# Patient Record
Sex: FEMALE | Race: WHITE | Employment: PART TIME | URBAN - METROPOLITAN AREA
[De-identification: names, ages, dates, MRNs, and addresses within clinical notes are randomized per-mention and may not be internally consistent; named-entity substitution may affect disease eponyms.]

---

## 2019-10-23 ENCOUNTER — TELEPHONE (OUTPATIENT)
Dept: INTERNAL MEDICINE CLINIC | Age: 64
End: 2019-10-23

## 2019-10-23 ENCOUNTER — OFFICE VISIT (OUTPATIENT)
Dept: INTERNAL MEDICINE CLINIC | Age: 64
End: 2019-10-23
Payer: MEDICARE

## 2019-10-23 VITALS
SYSTOLIC BLOOD PRESSURE: 128 MMHG | DIASTOLIC BLOOD PRESSURE: 88 MMHG | OXYGEN SATURATION: 97 % | RESPIRATION RATE: 16 BRPM | HEIGHT: 69 IN | HEART RATE: 77 BPM | BODY MASS INDEX: 27.85 KG/M2 | WEIGHT: 188 LBS

## 2019-10-23 DIAGNOSIS — Z12.11 SCREENING FOR COLON CANCER: ICD-10-CM

## 2019-10-23 DIAGNOSIS — Z23 NEED FOR PROPHYLACTIC VACCINATION AGAINST STREPTOCOCCUS PNEUMONIAE (PNEUMOCOCCUS): ICD-10-CM

## 2019-10-23 DIAGNOSIS — Z23 NEED FOR SHINGLES VACCINE: ICD-10-CM

## 2019-10-23 DIAGNOSIS — I10 ESSENTIAL HYPERTENSION: Primary | ICD-10-CM

## 2019-10-23 DIAGNOSIS — F32.A DEPRESSION, UNSPECIFIED DEPRESSION TYPE: ICD-10-CM

## 2019-10-23 DIAGNOSIS — E78.5 HYPERLIPIDEMIA, UNSPECIFIED HYPERLIPIDEMIA TYPE: ICD-10-CM

## 2019-10-23 DIAGNOSIS — Z23 NEEDS FLU SHOT: ICD-10-CM

## 2019-10-23 PROCEDURE — G8427 DOCREV CUR MEDS BY ELIG CLIN: HCPCS | Performed by: INTERNAL MEDICINE

## 2019-10-23 PROCEDURE — 99204 OFFICE O/P NEW MOD 45 MIN: CPT | Performed by: INTERNAL MEDICINE

## 2019-10-23 PROCEDURE — G0009 ADMIN PNEUMOCOCCAL VACCINE: HCPCS | Performed by: INTERNAL MEDICINE

## 2019-10-23 PROCEDURE — 1036F TOBACCO NON-USER: CPT | Performed by: INTERNAL MEDICINE

## 2019-10-23 PROCEDURE — 1090F PRES/ABSN URINE INCON ASSESS: CPT | Performed by: INTERNAL MEDICINE

## 2019-10-23 PROCEDURE — 1123F ACP DISCUSS/DSCN MKR DOCD: CPT | Performed by: INTERNAL MEDICINE

## 2019-10-23 PROCEDURE — G0008 ADMIN INFLUENZA VIRUS VAC: HCPCS | Performed by: INTERNAL MEDICINE

## 2019-10-23 PROCEDURE — 3017F COLORECTAL CA SCREEN DOC REV: CPT | Performed by: INTERNAL MEDICINE

## 2019-10-23 PROCEDURE — G8400 PT W/DXA NO RESULTS DOC: HCPCS | Performed by: INTERNAL MEDICINE

## 2019-10-23 PROCEDURE — 90653 IIV ADJUVANT VACCINE IM: CPT | Performed by: INTERNAL MEDICINE

## 2019-10-23 PROCEDURE — 4040F PNEUMOC VAC/ADMIN/RCVD: CPT | Performed by: INTERNAL MEDICINE

## 2019-10-23 PROCEDURE — G8417 CALC BMI ABV UP PARAM F/U: HCPCS | Performed by: INTERNAL MEDICINE

## 2019-10-23 PROCEDURE — 90670 PCV13 VACCINE IM: CPT | Performed by: INTERNAL MEDICINE

## 2019-10-23 PROCEDURE — G8482 FLU IMMUNIZE ORDER/ADMIN: HCPCS | Performed by: INTERNAL MEDICINE

## 2019-10-23 RX ORDER — ROSUVASTATIN CALCIUM 40 MG/1
40 TABLET, COATED ORAL DAILY
Qty: 30 TABLET | Refills: 3 | Status: SHIPPED | OUTPATIENT
Start: 2019-10-23 | End: 2020-02-24

## 2019-10-23 RX ORDER — VENLAFAXINE HYDROCHLORIDE 150 MG/1
150 CAPSULE, EXTENDED RELEASE ORAL DAILY
Refills: 0 | COMMUNITY
Start: 2019-09-17 | End: 2019-12-18 | Stop reason: SDUPTHER

## 2019-10-23 RX ORDER — ASCORBIC ACID 500 MG
500 TABLET ORAL DAILY
COMMUNITY

## 2019-10-23 RX ORDER — HYDROCHLOROTHIAZIDE 25 MG/1
25 TABLET ORAL DAILY
Refills: 3 | COMMUNITY
Start: 2019-09-03 | End: 2019-12-18 | Stop reason: SDUPTHER

## 2019-10-23 RX ORDER — ROSUVASTATIN CALCIUM 40 MG/1
40 TABLET, COATED ORAL EVERY EVENING
COMMUNITY
End: 2019-10-23

## 2019-10-23 RX ORDER — LISINOPRIL 20 MG/1
20 TABLET ORAL DAILY
Refills: 1 | COMMUNITY
Start: 2019-09-05 | End: 2020-02-07 | Stop reason: SDUPTHER

## 2019-10-23 SDOH — HEALTH STABILITY: MENTAL HEALTH: HOW OFTEN DO YOU HAVE A DRINK CONTAINING ALCOHOL?: 2-3 TIMES A WEEK

## 2019-10-23 SDOH — HEALTH STABILITY: MENTAL HEALTH: HOW MANY STANDARD DRINKS CONTAINING ALCOHOL DO YOU HAVE ON A TYPICAL DAY?: 1 OR 2

## 2019-10-23 ASSESSMENT — ENCOUNTER SYMPTOMS
ABDOMINAL PAIN: 0
NAUSEA: 0
RHINORRHEA: 0
SHORTNESS OF BREATH: 0
COUGH: 0
SORE THROAT: 0
DIARRHEA: 0
TROUBLE SWALLOWING: 0

## 2019-10-23 ASSESSMENT — PATIENT HEALTH QUESTIONNAIRE - PHQ9
SUM OF ALL RESPONSES TO PHQ QUESTIONS 1-9: 0
1. LITTLE INTEREST OR PLEASURE IN DOING THINGS: 0
SUM OF ALL RESPONSES TO PHQ QUESTIONS 1-9: 0
SUM OF ALL RESPONSES TO PHQ9 QUESTIONS 1 & 2: 0
2. FEELING DOWN, DEPRESSED OR HOPELESS: 0

## 2019-11-06 ENCOUNTER — TELEPHONE (OUTPATIENT)
Dept: INTERNAL MEDICINE CLINIC | Age: 64
End: 2019-11-06

## 2019-12-17 ENCOUNTER — TELEPHONE (OUTPATIENT)
Dept: INTERNAL MEDICINE CLINIC | Age: 64
End: 2019-12-17

## 2019-12-18 RX ORDER — VENLAFAXINE HYDROCHLORIDE 150 MG/1
150 CAPSULE, EXTENDED RELEASE ORAL DAILY
Qty: 90 CAPSULE | Refills: 1 | Status: SHIPPED | OUTPATIENT
Start: 2019-12-18 | End: 2020-09-09

## 2019-12-18 RX ORDER — VENLAFAXINE HYDROCHLORIDE 150 MG/1
150 CAPSULE, EXTENDED RELEASE ORAL DAILY
Qty: 30 CAPSULE | Refills: 3 | Status: SHIPPED | OUTPATIENT
Start: 2019-12-18 | End: 2019-12-18

## 2019-12-18 RX ORDER — HYDROCHLOROTHIAZIDE 25 MG/1
25 TABLET ORAL DAILY
Qty: 30 TABLET | Refills: 3 | Status: SHIPPED | OUTPATIENT
Start: 2019-12-18 | End: 2019-12-18

## 2019-12-18 RX ORDER — HYDROCHLOROTHIAZIDE 25 MG/1
25 TABLET ORAL DAILY
Qty: 90 TABLET | Refills: 1 | Status: SHIPPED | OUTPATIENT
Start: 2019-12-18 | End: 2020-10-05

## 2019-12-26 ENCOUNTER — OFFICE VISIT (OUTPATIENT)
Dept: INTERNAL MEDICINE CLINIC | Age: 64
End: 2019-12-26
Payer: MEDICARE

## 2019-12-26 VITALS
BODY MASS INDEX: 27.62 KG/M2 | HEART RATE: 100 BPM | TEMPERATURE: 98.5 F | OXYGEN SATURATION: 96 % | SYSTOLIC BLOOD PRESSURE: 128 MMHG | RESPIRATION RATE: 20 BRPM | WEIGHT: 187 LBS | DIASTOLIC BLOOD PRESSURE: 74 MMHG

## 2019-12-26 DIAGNOSIS — J20.9 BRONCHITIS, ACUTE, WITH BRONCHOSPASM: Primary | ICD-10-CM

## 2019-12-26 PROCEDURE — 1123F ACP DISCUSS/DSCN MKR DOCD: CPT | Performed by: INTERNAL MEDICINE

## 2019-12-26 PROCEDURE — 4040F PNEUMOC VAC/ADMIN/RCVD: CPT | Performed by: INTERNAL MEDICINE

## 2019-12-26 PROCEDURE — G8427 DOCREV CUR MEDS BY ELIG CLIN: HCPCS | Performed by: INTERNAL MEDICINE

## 2019-12-26 PROCEDURE — G8482 FLU IMMUNIZE ORDER/ADMIN: HCPCS | Performed by: INTERNAL MEDICINE

## 2019-12-26 PROCEDURE — 99213 OFFICE O/P EST LOW 20 MIN: CPT | Performed by: INTERNAL MEDICINE

## 2019-12-26 PROCEDURE — G8400 PT W/DXA NO RESULTS DOC: HCPCS | Performed by: INTERNAL MEDICINE

## 2019-12-26 PROCEDURE — 1036F TOBACCO NON-USER: CPT | Performed by: INTERNAL MEDICINE

## 2019-12-26 PROCEDURE — G8417 CALC BMI ABV UP PARAM F/U: HCPCS | Performed by: INTERNAL MEDICINE

## 2019-12-26 PROCEDURE — 1090F PRES/ABSN URINE INCON ASSESS: CPT | Performed by: INTERNAL MEDICINE

## 2019-12-26 PROCEDURE — 3017F COLORECTAL CA SCREEN DOC REV: CPT | Performed by: INTERNAL MEDICINE

## 2019-12-26 RX ORDER — GUAIFENESIN AND CODEINE PHOSPHATE 100; 10 MG/5ML; MG/5ML
5 SOLUTION ORAL 4 TIMES DAILY PRN
Qty: 120 ML | Refills: 0 | Status: SHIPPED | OUTPATIENT
Start: 2019-12-26 | End: 2020-01-02

## 2019-12-26 RX ORDER — AZITHROMYCIN 250 MG/1
250 TABLET, FILM COATED ORAL SEE ADMIN INSTRUCTIONS
Qty: 6 TABLET | Refills: 0 | Status: SHIPPED | OUTPATIENT
Start: 2019-12-26 | End: 2019-12-31

## 2019-12-26 RX ORDER — ALBUTEROL SULFATE 90 UG/1
2 AEROSOL, METERED RESPIRATORY (INHALATION) EVERY 6 HOURS PRN
Qty: 1 INHALER | Refills: 0 | Status: SHIPPED | OUTPATIENT
Start: 2019-12-26 | End: 2020-09-02

## 2019-12-26 ASSESSMENT — ENCOUNTER SYMPTOMS
VOICE CHANGE: 1
SHORTNESS OF BREATH: 1
RHINORRHEA: 0
CHEST TIGHTNESS: 0
SINUS PRESSURE: 1
EYE REDNESS: 0
WHEEZING: 0
SORE THROAT: 1
COUGH: 1

## 2020-02-07 ENCOUNTER — OFFICE VISIT (OUTPATIENT)
Dept: INTERNAL MEDICINE CLINIC | Age: 65
End: 2020-02-07
Payer: MEDICARE

## 2020-02-07 VITALS
BODY MASS INDEX: 28.21 KG/M2 | RESPIRATION RATE: 16 BRPM | OXYGEN SATURATION: 99 % | WEIGHT: 191 LBS | HEART RATE: 80 BPM | DIASTOLIC BLOOD PRESSURE: 84 MMHG | TEMPERATURE: 97.4 F | SYSTOLIC BLOOD PRESSURE: 134 MMHG

## 2020-02-07 PROCEDURE — G8482 FLU IMMUNIZE ORDER/ADMIN: HCPCS | Performed by: INTERNAL MEDICINE

## 2020-02-07 PROCEDURE — 1036F TOBACCO NON-USER: CPT | Performed by: INTERNAL MEDICINE

## 2020-02-07 PROCEDURE — G8417 CALC BMI ABV UP PARAM F/U: HCPCS | Performed by: INTERNAL MEDICINE

## 2020-02-07 PROCEDURE — 3017F COLORECTAL CA SCREEN DOC REV: CPT | Performed by: INTERNAL MEDICINE

## 2020-02-07 PROCEDURE — 99214 OFFICE O/P EST MOD 30 MIN: CPT | Performed by: INTERNAL MEDICINE

## 2020-02-07 PROCEDURE — G8428 CUR MEDS NOT DOCUMENT: HCPCS | Performed by: INTERNAL MEDICINE

## 2020-02-07 PROCEDURE — G8400 PT W/DXA NO RESULTS DOC: HCPCS | Performed by: INTERNAL MEDICINE

## 2020-02-07 PROCEDURE — 1123F ACP DISCUSS/DSCN MKR DOCD: CPT | Performed by: INTERNAL MEDICINE

## 2020-02-07 PROCEDURE — 4040F PNEUMOC VAC/ADMIN/RCVD: CPT | Performed by: INTERNAL MEDICINE

## 2020-02-07 PROCEDURE — G0403 EKG FOR INITIAL PREVENT EXAM: HCPCS | Performed by: INTERNAL MEDICINE

## 2020-02-07 PROCEDURE — G0402 INITIAL PREVENTIVE EXAM: HCPCS | Performed by: INTERNAL MEDICINE

## 2020-02-07 PROCEDURE — 1090F PRES/ABSN URINE INCON ASSESS: CPT | Performed by: INTERNAL MEDICINE

## 2020-02-07 RX ORDER — LISINOPRIL 20 MG/1
20 TABLET ORAL DAILY
Qty: 90 TABLET | Refills: 1 | Status: SHIPPED | OUTPATIENT
Start: 2020-02-07 | End: 2020-08-04

## 2020-02-07 RX ORDER — CHLORAL HYDRATE 500 MG
1000 CAPSULE ORAL DAILY
COMMUNITY
Start: 2020-02-07

## 2020-02-07 ASSESSMENT — LIFESTYLE VARIABLES
HOW OFTEN DURING THE LAST YEAR HAVE YOU FOUND THAT YOU WERE NOT ABLE TO STOP DRINKING ONCE YOU HAD STARTED: 0
HAVE YOU OR SOMEONE ELSE BEEN INJURED AS A RESULT OF YOUR DRINKING: 0
HOW OFTEN DURING THE LAST YEAR HAVE YOU NEEDED AN ALCOHOLIC DRINK FIRST THING IN THE MORNING TO GET YOURSELF GOING AFTER A NIGHT OF HEAVY DRINKING: 0
HOW OFTEN DURING THE LAST YEAR HAVE YOU FAILED TO DO WHAT WAS NORMALLY EXPECTED FROM YOU BECAUSE OF DRINKING: 0
AUDIT TOTAL SCORE: 4
HOW OFTEN DURING THE LAST YEAR HAVE YOU BEEN UNABLE TO REMEMBER WHAT HAPPENED THE NIGHT BEFORE BECAUSE YOU HAD BEEN DRINKING: 0
HAS A RELATIVE, FRIEND, DOCTOR, OR ANOTHER HEALTH PROFESSIONAL EXPRESSED CONCERN ABOUT YOUR DRINKING OR SUGGESTED YOU CUT DOWN: 0
HOW MANY STANDARD DRINKS CONTAINING ALCOHOL DO YOU HAVE ON A TYPICAL DAY: 0
HOW OFTEN DO YOU HAVE SIX OR MORE DRINKS ON ONE OCCASION: 0
HOW OFTEN DO YOU HAVE A DRINK CONTAINING ALCOHOL: 4
HOW OFTEN DURING THE LAST YEAR HAVE YOU HAD A FEELING OF GUILT OR REMORSE AFTER DRINKING: 0
AUDIT-C TOTAL SCORE: 4

## 2020-02-07 ASSESSMENT — ENCOUNTER SYMPTOMS
RHINORRHEA: 0
SHORTNESS OF BREATH: 0
SORE THROAT: 0
NAUSEA: 0
COUGH: 0
TROUBLE SWALLOWING: 0
ABDOMINAL PAIN: 0
DIARRHEA: 0

## 2020-02-07 ASSESSMENT — PATIENT HEALTH QUESTIONNAIRE - PHQ9
SUM OF ALL RESPONSES TO PHQ QUESTIONS 1-9: 0
SUM OF ALL RESPONSES TO PHQ QUESTIONS 1-9: 0

## 2020-02-07 NOTE — PROGRESS NOTES
[address] [facility] PRIMARY CARE  [address] [facility] [address]  Dept: [phone]  Dept Fax: [phone]  Loc: [phone]     2020    Shy Mcclure (:  1955) is a 71 y.o. female, here for evaluation of the following medical concerns:    Chief Complaint   Patient presents with    Medicare AWV       HPI     Patient is here for routine visit and her welcome to Medicare visit. Overall she is feeling well. She reports she has not been as active as she will be. She plans to join [employer]. She already does balance exercises at home. She likes to stay very on top of her health care. Review of Systems   Constitutional: Negative for fatigue and fever. HENT: Negative for rhinorrhea, sore throat and trouble swallowing. Eyes: Negative for visual disturbance. Respiratory: Negative for cough and shortness of breath. Cardiovascular: Negative for chest pain and palpitations. Gastrointestinal: Negative for abdominal pain, diarrhea and nausea. Genitourinary: Negative for decreased urine volume, dysuria and frequency. Stress incontinence   Musculoskeletal: Negative for arthralgias and myalgias. Skin: Negative for rash. Allergic/Immunologic: Negative for immunocompromised state. Neurological: Negative for dizziness, numbness and headaches. Hematological: Does not bruise/bleed easily. Psychiatric/Behavioral: Negative for dysphoric mood and sleep disturbance. The patient is not nervous/anxious.       Current Outpatient Medications   Medication Sig Dispense Refill    lisinopril (PRINIVIL;ZESTRIL) 20 MG tablet Take 1 tablet by mouth daily 90 tablet 1    Omega-3 Fatty Acids (FISH OIL) 1000 MG CAPS Take 1 capsule by mouth daily      zoster recombinant adjuvanted vaccine (SHINGRIX) 50 MCG/0.5ML SUSR injection Inject 0.5 mLs into the muscle once for 1 dose Repeat in 2-6 months 1 each 1    albuterol sulfate  (90 Base) MCG/ACT inhaler Inhale 2 puffs into the lungs every 6 hours as needed for Wheezing 1 Inhaler 0    hydrochlorothiazide (HYDRODIURIL) 25 MG tablet Take 1 tablet by mouth daily 90 tablet 1    venlafaxine (EFFEXOR XR) 150 MG extended release capsule Take 1 capsule by mouth daily 90 capsule 1    Cyanocobalamin (VITAMIN B12 PO) Take by mouth      CALCIUM-MAGNESIUM-ZINC PO Take by mouth      VITAMIN D PO Take by mouth      Multiple Vitamins-Minerals (MULTIVITAMIN ADULT PO) Take by mouth      vitamin C (ASCORBIC ACID) 500 MG tablet Take 500 mg by mouth daily      rosuvastatin (CRESTOR) 40 MG tablet Take 1 tablet by mouth daily 30 tablet 3     No current facility-administered medications for this visit.         Allergies   Allergen Reactions    Cephalosporins Hives and Shortness Of Breath    Penicillins Anaphylaxis       Past Medical History:   Diagnosis Date    Depression     Endometriosis     Essential hypertension     [facility] 2018    [facility]     Pap smear for cervical cancer screening 2016    Sleep apnea     cpap    Visit for review of DEXA scan     10/16-wnl T score       Past Surgical History:   Procedure Laterality Date    ANUS SURGERY      anal fissure    COLONOSCOPY  2014    recheck     CYST REMOVAL      right foot    JOINT REPLACEMENT Right     PARTIAL HYSTERECTOMY  2006    still w cervix and ovaries    SEPTOPLASTY      SUBTOTAL COLECTOMY  2007    perforated colon     TONSILLECTOMY         Social History     Tobacco Use    Smoking status: Former Smoker     Last attempt to quit: 1986     Years since quittin.1    Smokeless tobacco: Never Used   Substance Use Topics    Alcohol use: Yes     Frequency: 2-3 times a week     Drinks per session: 1 or 2    Drug use: Never        Family History   Problem Relation Age of Onset    Lung Cancer Mother     Prostate Cancer Father     Prostate Cancer Brother     Breast Cancer Maternal Cousin Vitals:    02/07/20 1023   BP: 134/84   Site: Right Upper Arm   Position: Sitting   Cuff Size: Large Adult   Pulse: 80  Comment: REGULAR   Resp: 16   Temp: 97.4 °F (36.3 °C)   TempSrc: Oral   SpO2: 99%  Comment: rOOM aIR   Weight: 191 lb (86.6 kg)     Estimated body mass index is 28.21 kg/m² as calculated from the following:    Height as of 10/23/19: 5' 9\" (1.753 m). Weight as of this encounter: 191 lb (86.6 kg). Physical Exam  Vitals signs and nursing note reviewed. Constitutional:       General: She is not in acute distress. Appearance: She is well-developed. HENT:      Head: Normocephalic and atraumatic. Right Ear: External ear normal.      Left Ear: External ear normal.      Nose: Nose normal.   Eyes:      General: No scleral icterus. Pupils: Pupils are equal, round, and reactive to light. Neck:      Thyroid: No thyromegaly. Cardiovascular:      Rate and Rhythm: Normal rate and regular rhythm. Heart sounds: No murmur. Pulmonary:      Effort: No respiratory distress. Breath sounds: Normal breath sounds. No wheezing or rales. Abdominal:      General: Bowel sounds are normal. There is no distension. Palpations: Abdomen is soft. Tenderness: There is no abdominal tenderness. Musculoskeletal: Normal range of motion. Lymphadenopathy:      Cervical: No cervical adenopathy. Skin:     General: Skin is warm and dry. Neurological:      Mental Status: She is alert and oriented to person, place, and time. Cranial Nerves: No cranial nerve deficit. Sensory: No sensory deficit. Coordination: Coordination normal.   Psychiatric:         Behavior: Behavior normal.         ASSESSMENT/PLAN:  1. Essential hypertension  Usually well-controlled. Didn't take her lisinopril last night because ran out. Continue hctz and restart Lisinopril. Call if dizziness.     2. Hyperlipidemia, unspecified hyperlipidemia type  We will check labs on current dose of mouth daily Yes Marily Valera MD   zoster recombinant adjuvanted vaccine (SHINGRIX) 50 MCG/0.5ML SUSR injection Inject 0.5 mLs into the muscle once for 1 dose Repeat in 2-6 months Yes Marily Valera MD   albuterol sulfate  (90 Base) MCG/ACT inhaler Inhale 2 puffs into the lungs every 6 hours as needed for Wheezing Yes Marily Valera MD   hydrochlorothiazide (HYDRODIURIL) 25 MG tablet Take 1 tablet by mouth daily Yes Marily Valera MD   venlafaxine (EFFEXOR XR) 150 MG extended release capsule Take 1 capsule by mouth daily Yes Marily Valera MD   Cyanocobalamin (VITAMIN B12 PO) Take by mouth Yes Historical Provider, MD   CALCIUM-MAGNESIUM-ZINC PO Take by mouth Yes Historical Provider, MD   VITAMIN D PO Take by mouth Yes Historical Provider, MD   Multiple Vitamins-Minerals (MULTIVITAMIN ADULT PO) Take by mouth Yes Historical Provider, MD   vitamin C (ASCORBIC ACID) 500 MG tablet Take 500 mg by mouth daily Yes Historical Provider, MD   rosuvastatin (CRESTOR) 40 MG tablet Take 1 tablet by mouth daily Yes Marily Valera MD         Past Medical History:   Diagnosis Date    Depression     Endometriosis     Essential hypertension     [facility]     Pap smear for cervical cancer screening 08/25/2016    Sleep apnea     cpap    Visit for review of DEXA scan     10/16-wnl T score       Past Surgical History:   Procedure Laterality Date    ANUS SURGERY      anal fissure    COLONOSCOPY  09/2014    recheck 9/19    CYST REMOVAL      right foot    JOINT REPLACEMENT Right 2011    PARTIAL HYSTERECTOMY  2006    still w cervix and ovaries    SEPTOPLASTY      SUBTOTAL COLECTOMY  09/2007    perforated colon     TONSILLECTOMY           Family History   Problem Relation Age of Onset    Lung Cancer Mother     Prostate Cancer Father     Prostate Cancer Brother     Breast Cancer Maternal Cousin

## 2020-02-07 NOTE — PATIENT INSTRUCTIONS
Dr. Kylie Aguirre -- pap/pelvic - last 8/16 was last pap  Get Gi/colonoscopy    Labs  EKG    ---    Personalized Preventive Plan for Jayshree Painter - 2/7/2020  Medicare offers a range of preventive health benefits. Some of the tests and screenings are paid in full while other may be subject to a deductible, co-insurance, and/or copay. Some of these benefits include a comprehensive review of your medical history including lifestyle, illnesses that may run in your family, and various assessments and screenings as appropriate. After reviewing your medical record and screening and assessments performed today your provider may have ordered immunizations, labs, imaging, and/or referrals for you. A list of these orders (if applicable) as well as your Preventive Care list are included within your After Visit Summary for your review. Other Preventive Recommendations:    · A preventive eye exam performed by an eye specialist is recommended every 1-2 years to screen for glaucoma; cataracts, macular degeneration, and other eye disorders. · A preventive dental visit is recommended every 6 months. · Try to get at least 150 minutes of exercise per week or 10,000 steps per day on a pedometer . · Order or download the FREE \"Exercise & Physical Activity: Your Everyday Guide\" from [employer]. Call 2-797.720.7251 or search [employer] online. · You need 7896-1453 mg of calcium and 5609-7198 IU of vitamin D per day. It is possible to meet your calcium requirement with diet alone, but a vitamin D supplement is usually necessary to meet this goal.  · When exposed to the sun, use a sunscreen that protects against both UVA and UVB radiation with an SPF of 30 or greater. Reapply every 2 to 3 hours or after sweating, drying off with a towel, or swimming. · Always wear a seat belt when traveling in a car. Always wear a helmet when riding a bicycle or motorcycle.

## 2020-02-24 ENCOUNTER — HOSPITAL ENCOUNTER (OUTPATIENT)
Dept: NON INVASIVE DIAGNOSTICS | Age: 65
Discharge: HOME | End: 2020-02-24
Payer: MEDICARE

## 2020-02-24 LAB
LV EF: 58 %
LVEF MODALITY: NORMAL

## 2020-02-24 PROCEDURE — C8929 TTE W OR WO FOL WCON,DOPPLER: HCPCS

## 2020-02-24 RX ORDER — ROSUVASTATIN CALCIUM 40 MG/1
TABLET, COATED ORAL
Qty: 90 TABLET | Refills: 1 | Status: SHIPPED | OUTPATIENT
Start: 2020-02-24 | End: 2020-08-26

## 2020-03-02 ENCOUNTER — TELEPHONE (OUTPATIENT)
Dept: INTERNAL MEDICINE CLINIC | Age: 65
End: 2020-03-02

## 2020-03-09 ENCOUNTER — OFFICE VISIT (OUTPATIENT)
Dept: CARDIOLOGY CLINIC | Age: 65
End: 2020-03-09
Payer: MEDICARE

## 2020-03-09 VITALS
OXYGEN SATURATION: 96 % | HEART RATE: 91 BPM | SYSTOLIC BLOOD PRESSURE: 110 MMHG | WEIGHT: 195.4 LBS | BODY MASS INDEX: 27.97 KG/M2 | DIASTOLIC BLOOD PRESSURE: 88 MMHG | HEIGHT: 70 IN

## 2020-03-09 PROCEDURE — 4040F PNEUMOC VAC/ADMIN/RCVD: CPT | Performed by: INTERNAL MEDICINE

## 2020-03-09 PROCEDURE — 1090F PRES/ABSN URINE INCON ASSESS: CPT | Performed by: INTERNAL MEDICINE

## 2020-03-09 PROCEDURE — 3017F COLORECTAL CA SCREEN DOC REV: CPT | Performed by: INTERNAL MEDICINE

## 2020-03-09 PROCEDURE — G8427 DOCREV CUR MEDS BY ELIG CLIN: HCPCS | Performed by: INTERNAL MEDICINE

## 2020-03-09 PROCEDURE — G8417 CALC BMI ABV UP PARAM F/U: HCPCS | Performed by: INTERNAL MEDICINE

## 2020-03-09 PROCEDURE — 1123F ACP DISCUSS/DSCN MKR DOCD: CPT | Performed by: INTERNAL MEDICINE

## 2020-03-09 PROCEDURE — 99214 OFFICE O/P EST MOD 30 MIN: CPT | Performed by: INTERNAL MEDICINE

## 2020-03-09 PROCEDURE — G8482 FLU IMMUNIZE ORDER/ADMIN: HCPCS | Performed by: INTERNAL MEDICINE

## 2020-03-09 PROCEDURE — G8400 PT W/DXA NO RESULTS DOC: HCPCS | Performed by: INTERNAL MEDICINE

## 2020-03-09 PROCEDURE — 1036F TOBACCO NON-USER: CPT | Performed by: INTERNAL MEDICINE

## 2020-03-09 ASSESSMENT — ENCOUNTER SYMPTOMS
SHORTNESS OF BREATH: 0
CHOKING: 0
COUGH: 0
CHEST TIGHTNESS: 0

## 2020-03-09 NOTE — PROGRESS NOTES
Subjective:      Patient ID: Tri Hall is a 71 y.o. female. HPI Referred abn ekg//HTN/lipids. No previous cardiac hx. Active. No exertional sx. No chest pain/sob. Exercising 4-5x per wk   No pnd or orthopnea. No palp/tachy/syncope. Feeling good.         Past Medical History:   Diagnosis Date    Depression     Endometriosis     Essential hypertension     H/O mammogram 2018    [place]    Hyperlipidemia     Pap smear for cervical cancer screening 2016    Sleep apnea     cpap    Visit for review of DEXA scan     10/16-wnl T score     Past Surgical History:   Procedure Laterality Date    ANUS SURGERY      anal fissure    COLONOSCOPY  2014    recheck     CYST REMOVAL      right foot    JOINT REPLACEMENT Right     PARTIAL HYSTERECTOMY  2006    still w cervix and ovaries    SEPTOPLASTY      SUBTOTAL COLECTOMY  2007    perforated colon     TONSILLECTOMY       Social History     Socioeconomic History    Marital status: Single     Spouse name: Not on file    Number of children: 0    Years of education: Not on file    Highest education level: Not on file   Occupational History    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     Comment: [employer]   Social Needs    Financial resource strain: Not on file    Food insecurity     Worry: Not on file     Inability: Not on file   [employer] needs     Medical: Not on file     Non-medical: Not on file   Tobacco Use    Smoking status: Former Smoker     Last attempt to quit: 1986     Years since quittin.1    Smokeless tobacco: Never Used   Substance and Sexual Activity    Alcohol use: Yes     Frequency: 2-3 times a week     Drinks per session: 1 or 2    Drug use: Never    Sexual activity: Not Currently   Lifestyle    Physical activity     Days per week: Not on file     Minutes per session: Not on file    Stress: Not on file   Relationships    Social connections     Talks on phone: Not on file     Gets together: Not on file Attends Rastafarian service: Not on file     Active member of club or organization: Not on file     Attends meetings of clubs or organizations: Not on file     Relationship status: Not on file    Intimate partner violence     Fear of current or ex partner: Not on file     Emotionally abused: Not on file     Physically abused: Not on file     Forced sexual activity: Not on file   Other Topics Concern    Not on file   Social History Narrative    Lives alone with dog (kaushal)     FH reviewed, Aunt with MI at 62,  Fa with stents in his [de-identified]      Vitals:    03/09/20 1455   BP: 110/88   Pulse: 91   SpO2: 96%     Wt 195    Review of Systems   Constitutional: Negative for activity change, appetite change and fatigue. Respiratory: Negative for cough, choking, chest tightness and shortness of breath. Cardiovascular: Negative for chest pain, palpitations and leg swelling. Denies PND or orthopnea. No tachycardia or syncope. Neurological: Negative for dizziness, syncope and light-headedness. Psychiatric/Behavioral: Negative for agitation, behavioral problems and confusion. All other systems reviewed and are negative. Objective:   Physical Exam  Constitutional:       General: She is not in acute distress. Appearance: Normal appearance. She is well-developed. HENT:      Head: Normocephalic. Right Ear: External ear normal.      Left Ear: External ear normal.   Neck:      Musculoskeletal: Normal range of motion. Vascular: No JVD. Cardiovascular:      Rate and Rhythm: Normal rate and regular rhythm. Heart sounds: Normal heart sounds. No murmur. No gallop. Pulmonary:      Effort: Pulmonary effort is normal. No respiratory distress. Breath sounds: Normal breath sounds. No stridor. No rhonchi or rales. Abdominal:      General: Bowel sounds are normal.      Palpations: Abdomen is soft. Tenderness: There is no abdominal tenderness. Musculoskeletal: Normal range of motion.

## 2020-03-10 ENCOUNTER — TELEPHONE (OUTPATIENT)
Dept: CARDIOLOGY CLINIC | Age: 65
End: 2020-03-10

## 2020-03-16 ENCOUNTER — HOSPITAL ENCOUNTER (OUTPATIENT)
Dept: NON INVASIVE DIAGNOSTICS | Age: 65
Discharge: HOME | End: 2020-03-16
Payer: MEDICARE

## 2020-03-16 LAB
LV EF: 68 %
LVEF MODALITY: NORMAL

## 2020-03-16 PROCEDURE — 6360000002 HC RX W HCPCS: Performed by: INTERNAL MEDICINE

## 2020-03-16 PROCEDURE — 93017 CV STRESS TEST TRACING ONLY: CPT

## 2020-03-16 PROCEDURE — A9502 TC99M TETROFOSMIN: HCPCS | Performed by: INTERNAL MEDICINE

## 2020-03-16 PROCEDURE — 78452 HT MUSCLE IMAGE SPECT MULT: CPT

## 2020-03-16 PROCEDURE — 3430000000 HC RX DIAGNOSTIC RADIOPHARMACEUTICAL: Performed by: INTERNAL MEDICINE

## 2020-03-16 PROCEDURE — 2580000003 HC RX 258: Performed by: INTERNAL MEDICINE

## 2020-03-16 RX ORDER — SODIUM CHLORIDE 0.9 % (FLUSH) 0.9 %
10 SYRINGE (ML) INJECTION PRN
Status: DISCONTINUED | OUTPATIENT
Start: 2020-03-16 | End: 2020-03-17 | Stop reason: HOSPADM

## 2020-03-16 RX ADMIN — TETROFOSMIN 33.2 MILLICURIE: 1.38 INJECTION, POWDER, LYOPHILIZED, FOR SOLUTION INTRAVENOUS at 13:21

## 2020-03-16 RX ADMIN — REGADENOSON 0.4 MG: 0.08 INJECTION, SOLUTION INTRAVENOUS at 13:21

## 2020-03-16 RX ADMIN — Medication 10 ML: at 12:23

## 2020-03-16 RX ADMIN — TETROFOSMIN 11.8 MILLICURIE: 1.38 INJECTION, POWDER, LYOPHILIZED, FOR SOLUTION INTRAVENOUS at 12:23

## 2020-03-16 RX ADMIN — Medication 10 ML: at 13:21

## 2020-04-13 ENCOUNTER — TELEMEDICINE (OUTPATIENT)
Dept: INTERNAL MEDICINE CLINIC | Age: 65
End: 2020-04-13
Payer: MEDICARE

## 2020-04-13 ENCOUNTER — TELEPHONE (OUTPATIENT)
Dept: INTERNAL MEDICINE CLINIC | Age: 65
End: 2020-04-13

## 2020-04-13 PROCEDURE — G8400 PT W/DXA NO RESULTS DOC: HCPCS | Performed by: INTERNAL MEDICINE

## 2020-04-13 PROCEDURE — 99213 OFFICE O/P EST LOW 20 MIN: CPT | Performed by: INTERNAL MEDICINE

## 2020-04-13 PROCEDURE — 1123F ACP DISCUSS/DSCN MKR DOCD: CPT | Performed by: INTERNAL MEDICINE

## 2020-04-13 PROCEDURE — G8427 DOCREV CUR MEDS BY ELIG CLIN: HCPCS | Performed by: INTERNAL MEDICINE

## 2020-04-13 PROCEDURE — 1090F PRES/ABSN URINE INCON ASSESS: CPT | Performed by: INTERNAL MEDICINE

## 2020-04-13 PROCEDURE — 3017F COLORECTAL CA SCREEN DOC REV: CPT | Performed by: INTERNAL MEDICINE

## 2020-04-13 PROCEDURE — 4040F PNEUMOC VAC/ADMIN/RCVD: CPT | Performed by: INTERNAL MEDICINE

## 2020-04-13 RX ORDER — CLARITHROMYCIN 500 MG/1
500 TABLET, COATED ORAL 2 TIMES DAILY
Qty: 20 TABLET | Refills: 0 | Status: SHIPPED | OUTPATIENT
Start: 2020-04-13 | End: 2020-04-23

## 2020-04-15 ENCOUNTER — TELEPHONE (OUTPATIENT)
Dept: INTERNAL MEDICINE CLINIC | Age: 65
End: 2020-04-15

## 2020-04-15 NOTE — TELEPHONE ENCOUNTER
Please let patient know her insurance company sent me a note that Atorvastatin would be cheaper for her than Crestor and they want to know if I can change her Rx. Is that okay with her?

## 2020-04-15 NOTE — TELEPHONE ENCOUNTER
Left message for patient to return call. Patient returned call and states she had reaction to Lipitor in the past with muscle weakness.

## 2020-05-14 ENCOUNTER — TELEPHONE (OUTPATIENT)
Dept: INTERNAL MEDICINE CLINIC | Age: 65
End: 2020-05-14

## 2020-08-04 RX ORDER — LISINOPRIL 20 MG/1
TABLET ORAL
Qty: 90 TABLET | Refills: 1 | Status: SHIPPED | OUTPATIENT
Start: 2020-08-04 | End: 2021-02-01

## 2020-08-26 RX ORDER — ROSUVASTATIN CALCIUM 40 MG/1
TABLET, COATED ORAL
Qty: 90 TABLET | Refills: 1 | Status: SHIPPED | OUTPATIENT
Start: 2020-08-26 | End: 2021-03-08

## 2020-09-02 ENCOUNTER — OFFICE VISIT (OUTPATIENT)
Dept: INTERNAL MEDICINE CLINIC | Age: 65
End: 2020-09-02
Payer: MEDICARE

## 2020-09-02 VITALS
TEMPERATURE: 97.2 F | BODY MASS INDEX: 28.73 KG/M2 | RESPIRATION RATE: 16 BRPM | HEART RATE: 82 BPM | SYSTOLIC BLOOD PRESSURE: 124 MMHG | DIASTOLIC BLOOD PRESSURE: 86 MMHG | WEIGHT: 194 LBS | OXYGEN SATURATION: 97 % | HEIGHT: 69 IN

## 2020-09-02 LAB
A/G RATIO: 2.1 (ref 1.1–2.2)
ALBUMIN SERPL-MCNC: 4.7 G/DL (ref 3.4–5)
ALP BLD-CCNC: 65 U/L (ref 40–129)
ALT SERPL-CCNC: 29 U/L (ref 10–40)
ANION GAP SERPL CALCULATED.3IONS-SCNC: 11 MMOL/L (ref 3–16)
AST SERPL-CCNC: 32 U/L (ref 15–37)
BILIRUB SERPL-MCNC: 0.4 MG/DL (ref 0–1)
BUN BLDV-MCNC: 22 MG/DL (ref 7–20)
CALCIUM SERPL-MCNC: 9.9 MG/DL (ref 8.3–10.6)
CHLORIDE BLD-SCNC: 97 MMOL/L (ref 99–110)
CHOLESTEROL, TOTAL: 217 MG/DL (ref 0–199)
CO2: 28 MMOL/L (ref 21–32)
CREAT SERPL-MCNC: 0.7 MG/DL (ref 0.6–1.2)
GFR AFRICAN AMERICAN: >60
GFR NON-AFRICAN AMERICAN: >60
GLOBULIN: 2.2 G/DL
GLUCOSE BLD-MCNC: 115 MG/DL (ref 70–99)
HDLC SERPL-MCNC: 47 MG/DL (ref 40–60)
LDL CHOLESTEROL CALCULATED: ABNORMAL MG/DL
LDL CHOLESTEROL DIRECT: 82 MG/DL
POTASSIUM SERPL-SCNC: 4.2 MMOL/L (ref 3.5–5.1)
SODIUM BLD-SCNC: 136 MMOL/L (ref 136–145)
TOTAL PROTEIN: 6.9 G/DL (ref 6.4–8.2)
TRIGL SERPL-MCNC: 400 MG/DL (ref 0–150)
VLDLC SERPL CALC-MCNC: ABNORMAL MG/DL

## 2020-09-02 PROCEDURE — 1036F TOBACCO NON-USER: CPT | Performed by: INTERNAL MEDICINE

## 2020-09-02 PROCEDURE — G8417 CALC BMI ABV UP PARAM F/U: HCPCS | Performed by: INTERNAL MEDICINE

## 2020-09-02 PROCEDURE — 1090F PRES/ABSN URINE INCON ASSESS: CPT | Performed by: INTERNAL MEDICINE

## 2020-09-02 PROCEDURE — 99214 OFFICE O/P EST MOD 30 MIN: CPT | Performed by: INTERNAL MEDICINE

## 2020-09-02 PROCEDURE — 1123F ACP DISCUSS/DSCN MKR DOCD: CPT | Performed by: INTERNAL MEDICINE

## 2020-09-02 PROCEDURE — G8427 DOCREV CUR MEDS BY ELIG CLIN: HCPCS | Performed by: INTERNAL MEDICINE

## 2020-09-02 PROCEDURE — 4040F PNEUMOC VAC/ADMIN/RCVD: CPT | Performed by: INTERNAL MEDICINE

## 2020-09-02 PROCEDURE — 3017F COLORECTAL CA SCREEN DOC REV: CPT | Performed by: INTERNAL MEDICINE

## 2020-09-02 PROCEDURE — G8400 PT W/DXA NO RESULTS DOC: HCPCS | Performed by: INTERNAL MEDICINE

## 2020-09-02 RX ORDER — PSEUDOEPHEDRINE HCL 30 MG
250 TABLET ORAL DAILY
Qty: 60 TABLET | Refills: 0 | COMMUNITY
Start: 2020-09-02

## 2020-09-02 ASSESSMENT — ENCOUNTER SYMPTOMS
TROUBLE SWALLOWING: 0
ABDOMINAL PAIN: 0
DIARRHEA: 0
SORE THROAT: 0
SHORTNESS OF BREATH: 0
RHINORRHEA: 0
NAUSEA: 0
COUGH: 0

## 2020-09-02 NOTE — PROGRESS NOTES
[address] [facility] PRIMARY CARE  [address] [facility] [place] [facility] [address]  Dept: [phone]  Dept Fax: [phone]  Loc: [phone]     2020     Cinda Pizarro (:  1955) is a 76 y.o. female, here for evaluation of the following medical concerns:    Chief Complaint   Patient presents with    Follow-up     \"Bump on right ring finger. HPI   Patient is here for routine visit. Overall she feels well and is compliant with her medications. She questions whether the bronchitis episode she had in December may have been COVID. She notes a nodule on her distal right second finger. Review of Systems   Constitutional: Negative for fatigue and fever. HENT: Negative for rhinorrhea, sore throat and trouble swallowing. Eyes: Negative for visual disturbance. Respiratory: Negative for cough and shortness of breath. Cardiovascular: Negative for chest pain and palpitations. Gastrointestinal: Negative for abdominal pain, diarrhea and nausea. Genitourinary: Negative for decreased urine volume, dysuria and frequency. Musculoskeletal: Negative for arthralgias and myalgias. Skin: Negative for rash. Allergic/Immunologic: Negative for immunocompromised state. Neurological: Negative for dizziness, numbness and headaches. Hematological: Does not bruise/bleed easily. Psychiatric/Behavioral: Negative for dysphoric mood and sleep disturbance. The patient is not nervous/anxious. Prior to Visit Medications    Medication Sig Taking?  Authorizing Provider   rosuvastatin (CRESTOR) 40 MG tablet TAKE 1 TABLET BY MOUTH EVERY DAY  Carmelita Redmond MD   lisinopril (PRINIVIL;ZESTRIL) 20 MG tablet TAKE 1 TABLET BY MOUTH EVERY DAY  Carmelita Redmond MD   Omega-3 Fatty Acids (FISH OIL) 1000 MG CAPS Take 1 capsule by mouth daily  Benjamin Sandoval MD   albuterol sulfate  (90 Base) MCG/ACT inhaler Inhale 2 puffs into the lungs every 6 hours as needed for Wheezing  Kimani Da Silva MD   hydrochlorothiazide (HYDRODIURIL) 25 MG tablet Take 1 tablet by mouth daily  Kimani Da Silva MD   venlafaxine (EFFEXOR XR) 150 MG extended release capsule Take 1 capsule by mouth daily  Kimani Da Silva MD   Cyanocobalamin (VITAMIN B12 PO) Take by mouth  Historical Provider, MD   CALCIUM-MAGNESIUM-ZINC PO Take by mouth 2 times daily   Historical Provider, MD   VITAMIN D PO Take by mouth  Historical Provider, MD   Multiple Vitamins-Minerals (MULTIVITAMIN ADULT PO) Take by mouth  Historical Provider, MD   vitamin C (ASCORBIC ACID) 500 MG tablet Take 500 mg by mouth daily  Historical Provider, MD        Social History     Tobacco Use    Smoking status: Former Smoker     Last attempt to quit: 1986     Years since quittin.6    Smokeless tobacco: Never Used   Substance Use Topics    Alcohol use: Yes     Frequency: 2-3 times a week     Drinks per session: 1 or 2    Drug use: Never        Vitals:    20 0817   BP: 124/86   Site: Right Upper Arm   Position: Sitting   Cuff Size: Large Adult   Pulse: 82  Comment: Regular   Resp: 16   Temp: 97.2 °F (36.2 °C)   TempSrc: Temporal   SpO2: 97%  Comment: Room AIr   Weight: 194 lb (88 kg)   Height: 5' 9\" (1.753 m)     Estimated body mass index is 28.65 kg/m² as calculated from the following:    Height as of this encounter: 5' 9\" (1.753 m). Weight as of this encounter: 194 lb (88 kg). Physical Exam  Vitals signs and nursing note reviewed. Constitutional:       General: She is not in acute distress. Appearance: She is well-developed. HENT:      Head: Normocephalic and atraumatic. Right Ear: External ear normal.      Left Ear: External ear normal.      Nose: Nose normal.   Eyes:      General: No scleral icterus. Pupils: Pupils are equal, round, and reactive to light. Neck:      Thyroid: No thyromegaly.    Cardiovascular:      Rate and Rhythm: Normal rate and regular rhythm. Heart sounds: No murmur. Pulmonary:      Effort: No respiratory distress. Breath sounds: Normal breath sounds. No wheezing or rales. Abdominal:      General: Bowel sounds are normal. There is no distension. Palpations: Abdomen is soft. Tenderness: There is no abdominal tenderness. Musculoskeletal: Normal range of motion. Lymphadenopathy:      Cervical: No cervical adenopathy. Skin:     General: Skin is warm and dry. Neurological:      Mental Status: She is alert and oriented to person, place, and time. Cranial Nerves: No cranial nerve deficit. Sensory: No sensory deficit. Coordination: Coordination normal.   Psychiatric:         Behavior: Behavior normal.         ASSESSMENT/PLAN:  1. Essential hypertension  Controlled on lisinopril and hydrochlorothiazide  - Comprehensive Metabolic Panel; Future    2. Depression, unspecified depression type  Well-controlled on Effexor    3. Hyperlipidemia, unspecified hyperlipidemia type  Controlled on Crestor  - Lipid Panel; Future    4. Hyperglycemia    - Hemoglobin A1C; Future    5. Screening for breast cancer    - [facility] DIGITAL SCREEN W OR WO CAD BILATERAL; Future    6. Screening for colon cancer    - [facility] Perez MD, Gastroenterology, [place]    7. Encounter for screening mammogram for malignant neoplasm of breast     - [facility] DIGITAL SCREEN W OR WO CAD BILATERAL; Future    Return in about 6 months (around 3/2/2021). Age and gender appropriate preventive health care needs were discussed with patient and addressed as needed.

## 2020-09-03 LAB
ESTIMATED AVERAGE GLUCOSE: 122.6 MG/DL
HBA1C MFR BLD: 5.9 %

## 2020-09-09 RX ORDER — VENLAFAXINE HYDROCHLORIDE 150 MG/1
CAPSULE, EXTENDED RELEASE ORAL
Qty: 90 CAPSULE | Refills: 1 | Status: SHIPPED | OUTPATIENT
Start: 2020-09-09 | End: 2021-03-15

## 2020-10-05 RX ORDER — HYDROCHLOROTHIAZIDE 25 MG/1
TABLET ORAL
Qty: 90 TABLET | Refills: 1 | Status: SHIPPED | OUTPATIENT
Start: 2020-10-05 | End: 2021-03-17 | Stop reason: SDUPTHER

## 2021-02-01 DIAGNOSIS — I10 ESSENTIAL HYPERTENSION: ICD-10-CM

## 2021-02-01 RX ORDER — LISINOPRIL 20 MG/1
TABLET ORAL
Qty: 90 TABLET | Refills: 1 | Status: SHIPPED | OUTPATIENT
Start: 2021-02-01 | End: 2021-03-25 | Stop reason: SDUPTHER

## 2021-03-04 ENCOUNTER — OFFICE VISIT (OUTPATIENT)
Dept: INTERNAL MEDICINE CLINIC | Age: 66
End: 2021-03-04
Payer: MEDICARE

## 2021-03-04 VITALS
OXYGEN SATURATION: 97 % | TEMPERATURE: 97.8 F | WEIGHT: 193 LBS | RESPIRATION RATE: 16 BRPM | BODY MASS INDEX: 28.58 KG/M2 | HEART RATE: 80 BPM | HEIGHT: 69 IN | DIASTOLIC BLOOD PRESSURE: 90 MMHG | SYSTOLIC BLOOD PRESSURE: 138 MMHG

## 2021-03-04 DIAGNOSIS — I10 ESSENTIAL HYPERTENSION: Primary | ICD-10-CM

## 2021-03-04 DIAGNOSIS — R93.7 ABNORMAL BONE DENSITY SCREENING: ICD-10-CM

## 2021-03-04 DIAGNOSIS — R41.3 MEMORY LOSS: ICD-10-CM

## 2021-03-04 DIAGNOSIS — F32.A DEPRESSION, UNSPECIFIED DEPRESSION TYPE: ICD-10-CM

## 2021-03-04 DIAGNOSIS — E78.5 HYPERLIPIDEMIA, UNSPECIFIED HYPERLIPIDEMIA TYPE: ICD-10-CM

## 2021-03-04 PROCEDURE — 1090F PRES/ABSN URINE INCON ASSESS: CPT | Performed by: INTERNAL MEDICINE

## 2021-03-04 PROCEDURE — 1036F TOBACCO NON-USER: CPT | Performed by: INTERNAL MEDICINE

## 2021-03-04 PROCEDURE — 1123F ACP DISCUSS/DSCN MKR DOCD: CPT | Performed by: INTERNAL MEDICINE

## 2021-03-04 PROCEDURE — G8417 CALC BMI ABV UP PARAM F/U: HCPCS | Performed by: INTERNAL MEDICINE

## 2021-03-04 PROCEDURE — 4040F PNEUMOC VAC/ADMIN/RCVD: CPT | Performed by: INTERNAL MEDICINE

## 2021-03-04 PROCEDURE — 3017F COLORECTAL CA SCREEN DOC REV: CPT | Performed by: INTERNAL MEDICINE

## 2021-03-04 PROCEDURE — G8427 DOCREV CUR MEDS BY ELIG CLIN: HCPCS | Performed by: INTERNAL MEDICINE

## 2021-03-04 PROCEDURE — 99214 OFFICE O/P EST MOD 30 MIN: CPT | Performed by: INTERNAL MEDICINE

## 2021-03-04 PROCEDURE — G8484 FLU IMMUNIZE NO ADMIN: HCPCS | Performed by: INTERNAL MEDICINE

## 2021-03-04 PROCEDURE — 3288F FALL RISK ASSESSMENT DOCD: CPT | Performed by: INTERNAL MEDICINE

## 2021-03-04 PROCEDURE — G8400 PT W/DXA NO RESULTS DOC: HCPCS | Performed by: INTERNAL MEDICINE

## 2021-03-04 ASSESSMENT — PATIENT HEALTH QUESTIONNAIRE - PHQ9
2. FEELING DOWN, DEPRESSED OR HOPELESS: 0
SUM OF ALL RESPONSES TO PHQ QUESTIONS 1-9: 0
SUM OF ALL RESPONSES TO PHQ QUESTIONS 1-9: 0

## 2021-03-04 ASSESSMENT — ENCOUNTER SYMPTOMS
ABDOMINAL PAIN: 0
SHORTNESS OF BREATH: 0

## 2021-03-04 NOTE — PROGRESS NOTES
Cecilia Hernández (:  1955) is a 76 y.o. female, here for evaluation of the following chief complaint(s):    Follow-up      ASSESSMENT/PLAN:    Essential hypertension  Blood pressure is a little elevated today. She will check it at home. For now she will remain on lisinopril and hydrochlorothiazide at current doses. Abnormal bone density screening  She takes vitamin D and calcium.  -     DEXA BONE DENSITY AXIAL SKELETON; Future    Hyperlipidemia, unspecified hyperlipidemia type  Stable on rosuvastatin  -     Lipid Panel; Future  -     Comprehensive Metabolic Panel, Fasting; Future    Memory loss  Suspect normal age-related changes. She scored 30 out of 30 on her MoCA test.  -     TSH without Reflex; Future    Depression, unspecified depression type  Well-controlled on Effexor    HM-  Pneumovax 23 one month after 2d Covid at pharmacy  Shingrix at pharmacy-2 part (0, 2-6months)  She will get Dexa/mammogram/gi-colon    Return in about 3 months (around 2021). SUBJECTIVE/OBJECTIVE:  HPI   Patient is here for routine visit. Overall she feels well. She notes some trouble finding words on occasion. She states one of her grandparents a dementia and she is thinking about starting prevagen. Review of Systems   Constitutional: Negative for unexpected weight change. Respiratory: Negative for shortness of breath. Cardiovascular: Negative for chest pain. Gastrointestinal: Negative for abdominal pain. Psychiatric/Behavioral: Negative for dysphoric mood.        Past Medical History:   Diagnosis Date    Abnormal EKG     saw cardio, wnl stress test    Allergic rhinitis     spring trees    Depression     Endometriosis     Essential hypertension     H/O mammogram 2018    [place]    Hyperlipidemia     Pap smear for cervical cancer screening 2016    Plantar fasciitis     Recurrent sinus infections     Sleep apnea     cpap    Visit for review of DEXA scan     10/16-wnl T score Current Outpatient Medications   Medication Sig Dispense Refill    lisinopril (PRINIVIL;ZESTRIL) 20 MG tablet TAKE 1 TABLET BY MOUTH EVERY DAY 90 tablet 1    hydroCHLOROthiazide (HYDRODIURIL) 25 MG tablet TAKE 1 TABLET BY MOUTH EVERY DAY 90 tablet 1    venlafaxine (EFFEXOR XR) 150 MG extended release capsule TAKE 1 CAPSULE BY MOUTH EVERY DAY 90 capsule 1    calcium citrate (CALCITRATE) 250 MG TABS tablet Take 1 tablet by mouth daily 60 tablet 0    rosuvastatin (CRESTOR) 40 MG tablet TAKE 1 TABLET BY MOUTH EVERY DAY 90 tablet 1    Omega-3 Fatty Acids (FISH OIL) 1000 MG CAPS Take 1 capsule by mouth daily      Cyanocobalamin (VITAMIN B12 PO) Take by mouth      VITAMIN D PO Take by mouth      Multiple Vitamins-Minerals (MULTIVITAMIN ADULT PO) Take by mouth      vitamin C (ASCORBIC ACID) 500 MG tablet Take 500 mg by mouth daily       No current facility-administered medications for this visit. Physical Exam  Constitutional:       Appearance: Normal appearance. HENT:      Head: Normocephalic and atraumatic. Cardiovascular:      Rate and Rhythm: Normal rate and regular rhythm. Pulmonary:      Effort: Pulmonary effort is normal.      Breath sounds: Normal breath sounds. Musculoskeletal:      Right lower leg: No edema. Left lower leg: No edema. Neurological:      General: No focal deficit present. Mental Status: She is alert and oriented to person, place, and time. Psychiatric:         Mood and Affect: Mood normal.               This note was generated completely or in part utilizing Dragon dictation speech recognition software. Occasionally, words are mistranscribed and despite editing, the text may contain inaccuracies due to incorrect word recognition. If further clarification is needed please contact the office at [phone]          An electronic signature was used to authenticate this note.     --Geeta Shelton MD

## 2021-03-04 NOTE — PATIENT INSTRUCTIONS
Pneumovax 23 one month after 2d Covid at pharmacy  Shingrix at pharmacy-2 part (0, 2-6months)    Labs    Dexa/mammogram/gi-colon  [phone]    Call with home blood pressure readings  If still around 140/90 may increase to lisinopril 30 mg

## 2021-03-06 DIAGNOSIS — E78.5 HYPERLIPIDEMIA, UNSPECIFIED HYPERLIPIDEMIA TYPE: ICD-10-CM

## 2021-03-08 RX ORDER — ROSUVASTATIN CALCIUM 40 MG/1
TABLET, COATED ORAL
Qty: 90 TABLET | Refills: 1 | Status: SHIPPED | OUTPATIENT
Start: 2021-03-08 | End: 2021-03-17 | Stop reason: SDUPTHER

## 2021-03-15 RX ORDER — VENLAFAXINE HYDROCHLORIDE 150 MG/1
CAPSULE, EXTENDED RELEASE ORAL
Qty: 90 CAPSULE | Refills: 1 | Status: SHIPPED | OUTPATIENT
Start: 2021-03-15 | End: 2021-03-17 | Stop reason: SDUPTHER

## 2021-03-17 ENCOUNTER — TELEPHONE (OUTPATIENT)
Dept: INTERNAL MEDICINE CLINIC | Age: 66
End: 2021-03-17

## 2021-03-17 DIAGNOSIS — E78.5 HYPERLIPIDEMIA, UNSPECIFIED HYPERLIPIDEMIA TYPE: ICD-10-CM

## 2021-03-17 RX ORDER — VENLAFAXINE HYDROCHLORIDE 150 MG/1
CAPSULE, EXTENDED RELEASE ORAL
Qty: 90 CAPSULE | Refills: 1 | Status: SHIPPED | OUTPATIENT
Start: 2021-03-17 | End: 2021-12-07

## 2021-03-17 RX ORDER — HYDROCHLOROTHIAZIDE 25 MG/1
TABLET ORAL
Qty: 90 TABLET | Refills: 1 | Status: SHIPPED | OUTPATIENT
Start: 2021-03-17 | End: 2021-04-12

## 2021-03-17 RX ORDER — ROSUVASTATIN CALCIUM 40 MG/1
TABLET, COATED ORAL
Qty: 90 TABLET | Refills: 1 | Status: SHIPPED | OUTPATIENT
Start: 2021-03-17 | End: 2022-02-07

## 2021-03-17 RX ORDER — VENLAFAXINE HYDROCHLORIDE 150 MG/1
CAPSULE, EXTENDED RELEASE ORAL
Qty: 90 CAPSULE | Refills: 1 | OUTPATIENT
Start: 2021-03-17

## 2021-03-17 NOTE — TELEPHONE ENCOUNTER
Patient is requesting a refill for the following medication which she wants sent to a New Pharmacy:    venlafaxine (EFFEXOR XR) 150 MG extended release capsule #90 TAKE 1 CAPSULE BY MOUTH EVERY DAY     rosuvastatin (CRESTOR) 40 MG tablet#90 TAKE 1 TABLET BY MOUTH EVERY DAY     hydroCHLOROthiazide (HYDRODIURIL) 25 MG tablet #90 TAKE 1 TABLET BY MOUTH EVERY DAY       [facility] [address] - P [phone] Benoit Garcia [phone]     Patient made aware to allow 24 to 48 business hours for prescription refills. Patient can be reached @ phone # provided should there be any questions.

## 2021-03-17 NOTE — TELEPHONE ENCOUNTER
Spoke with Miguel Angel Gage, pharmacy technician and this is a duplicate request.  Refilled earlier today.

## 2021-03-25 ENCOUNTER — TELEPHONE (OUTPATIENT)
Dept: INTERNAL MEDICINE CLINIC | Age: 66
End: 2021-03-25

## 2021-03-25 DIAGNOSIS — I10 ESSENTIAL HYPERTENSION: ICD-10-CM

## 2021-03-25 RX ORDER — LISINOPRIL 20 MG/1
TABLET ORAL
Qty: 90 TABLET | Refills: 1 | Status: SHIPPED
Start: 2021-03-25 | End: 2021-06-09 | Stop reason: SINTOL

## 2021-03-25 NOTE — TELEPHONE ENCOUNTER
Patient would like to have this medication sent to her pharmacy for a refill as soon as possible     lisinopril (PRINIVIL;ZESTRIL) 20 MG tablet TAKE 1 TABLET BY MOUTH EVERY DAY     Her Pharmacy is below:    [address], [address] [place] [address] [phone] - f [phone]     Patient has been made aware of the 24-48 hour turn around time.

## 2021-03-29 ENCOUNTER — HOSPITAL ENCOUNTER (OUTPATIENT)
Dept: MAMMOGRAPHY | Age: 66
Discharge: HOME | End: 2021-03-29
Payer: MEDICARE

## 2021-03-29 DIAGNOSIS — Z12.39 SCREENING FOR BREAST CANCER: ICD-10-CM

## 2021-03-29 DIAGNOSIS — Z12.31 ENCOUNTER FOR SCREENING MAMMOGRAM FOR MALIGNANT NEOPLASM OF BREAST: ICD-10-CM

## 2021-03-29 PROCEDURE — 77063 BREAST TOMOSYNTHESIS BI: CPT

## 2021-04-12 DIAGNOSIS — R41.3 MEMORY LOSS: ICD-10-CM

## 2021-04-12 DIAGNOSIS — E78.5 HYPERLIPIDEMIA, UNSPECIFIED HYPERLIPIDEMIA TYPE: ICD-10-CM

## 2021-04-12 RX ORDER — HYDROCHLOROTHIAZIDE 25 MG/1
TABLET ORAL
Qty: 90 TABLET | Refills: 1 | Status: SHIPPED | OUTPATIENT
Start: 2021-04-12 | End: 2021-12-07

## 2021-04-13 LAB
A/G RATIO: 2.2 (ref 1.1–2.2)
ALBUMIN SERPL-MCNC: 4.9 G/DL (ref 3.4–5)
ALP BLD-CCNC: 67 U/L (ref 40–129)
ALT SERPL-CCNC: 35 U/L (ref 10–40)
ANION GAP SERPL CALCULATED.3IONS-SCNC: 14 MMOL/L (ref 3–16)
AST SERPL-CCNC: 44 U/L (ref 15–37)
BILIRUB SERPL-MCNC: 0.4 MG/DL (ref 0–1)
BUN BLDV-MCNC: 18 MG/DL (ref 7–20)
CALCIUM SERPL-MCNC: 9.7 MG/DL (ref 8.3–10.6)
CHLORIDE BLD-SCNC: 101 MMOL/L (ref 99–110)
CHOLESTEROL, TOTAL: 186 MG/DL (ref 0–199)
CO2: 29 MMOL/L (ref 21–32)
CREAT SERPL-MCNC: 0.7 MG/DL (ref 0.6–1.2)
GFR AFRICAN AMERICAN: >60
GFR NON-AFRICAN AMERICAN: >60
GLOBULIN: 2.2 G/DL
GLUCOSE FASTING: 108 MG/DL (ref 70–99)
HDLC SERPL-MCNC: 65 MG/DL (ref 40–60)
LDL CHOLESTEROL CALCULATED: 67 MG/DL
POTASSIUM SERPL-SCNC: 4.6 MMOL/L (ref 3.5–5.1)
SODIUM BLD-SCNC: 144 MMOL/L (ref 136–145)
TOTAL PROTEIN: 7.1 G/DL (ref 6.4–8.2)
TRIGL SERPL-MCNC: 272 MG/DL (ref 0–150)
TSH SERPL DL<=0.05 MIU/L-ACNC: 1.86 UIU/ML (ref 0.27–4.2)
VLDLC SERPL CALC-MCNC: 54 MG/DL

## 2021-06-09 ENCOUNTER — OFFICE VISIT (OUTPATIENT)
Dept: INTERNAL MEDICINE CLINIC | Age: 66
End: 2021-06-09
Payer: MEDICARE

## 2021-06-09 VITALS
OXYGEN SATURATION: 98 % | DIASTOLIC BLOOD PRESSURE: 88 MMHG | HEART RATE: 82 BPM | RESPIRATION RATE: 16 BRPM | WEIGHT: 189 LBS | SYSTOLIC BLOOD PRESSURE: 130 MMHG | TEMPERATURE: 98.3 F | BODY MASS INDEX: 27.99 KG/M2 | HEIGHT: 69 IN

## 2021-06-09 DIAGNOSIS — G47.30 SLEEP APNEA, UNSPECIFIED TYPE: ICD-10-CM

## 2021-06-09 DIAGNOSIS — Z00.00 ROUTINE GENERAL MEDICAL EXAMINATION AT A HEALTH CARE FACILITY: Primary | ICD-10-CM

## 2021-06-09 DIAGNOSIS — R73.03 PRE-DIABETES: ICD-10-CM

## 2021-06-09 DIAGNOSIS — J39.2 THROAT IRRITATION: ICD-10-CM

## 2021-06-09 DIAGNOSIS — R74.8 ELEVATED LIVER ENZYMES: ICD-10-CM

## 2021-06-09 DIAGNOSIS — I10 ESSENTIAL HYPERTENSION: ICD-10-CM

## 2021-06-09 PROCEDURE — G0439 PPPS, SUBSEQ VISIT: HCPCS | Performed by: INTERNAL MEDICINE

## 2021-06-09 PROCEDURE — G0009 ADMIN PNEUMOCOCCAL VACCINE: HCPCS | Performed by: INTERNAL MEDICINE

## 2021-06-09 PROCEDURE — 1123F ACP DISCUSS/DSCN MKR DOCD: CPT | Performed by: INTERNAL MEDICINE

## 2021-06-09 PROCEDURE — 4040F PNEUMOC VAC/ADMIN/RCVD: CPT | Performed by: INTERNAL MEDICINE

## 2021-06-09 PROCEDURE — 3017F COLORECTAL CA SCREEN DOC REV: CPT | Performed by: INTERNAL MEDICINE

## 2021-06-09 PROCEDURE — 90732 PPSV23 VACC 2 YRS+ SUBQ/IM: CPT | Performed by: INTERNAL MEDICINE

## 2021-06-09 RX ORDER — LOSARTAN POTASSIUM 50 MG/1
50 TABLET ORAL DAILY
Qty: 30 TABLET | Refills: 5 | Status: SHIPPED | OUTPATIENT
Start: 2021-06-09 | End: 2021-09-15

## 2021-06-09 RX ORDER — FEXOFENADINE HCL 180 MG/1
180 TABLET ORAL DAILY
Qty: 30 TABLET | Refills: 0 | COMMUNITY
Start: 2021-06-09 | End: 2021-07-09

## 2021-06-09 SDOH — ECONOMIC STABILITY: FOOD INSECURITY: WITHIN THE PAST 12 MONTHS, THE FOOD YOU BOUGHT JUST DIDN'T LAST AND YOU DIDN'T HAVE MONEY TO GET MORE.: NEVER TRUE

## 2021-06-09 SDOH — ECONOMIC STABILITY: FOOD INSECURITY: WITHIN THE PAST 12 MONTHS, YOU WORRIED THAT YOUR FOOD WOULD RUN OUT BEFORE YOU GOT MONEY TO BUY MORE.: NEVER TRUE

## 2021-06-09 ASSESSMENT — LIFESTYLE VARIABLES
HOW OFTEN DURING THE LAST YEAR HAVE YOU HAD A FEELING OF GUILT OR REMORSE AFTER DRINKING: 0
HOW OFTEN DO YOU HAVE A DRINK CONTAINING ALCOHOL: 3
HOW OFTEN DURING THE LAST YEAR HAVE YOU FOUND THAT YOU WERE NOT ABLE TO STOP DRINKING ONCE YOU HAD STARTED: 0
HOW MANY STANDARD DRINKS CONTAINING ALCOHOL DO YOU HAVE ON A TYPICAL DAY: 0
HOW OFTEN DURING THE LAST YEAR HAVE YOU BEEN UNABLE TO REMEMBER WHAT HAPPENED THE NIGHT BEFORE BECAUSE YOU HAD BEEN DRINKING: 0
HOW OFTEN DURING THE LAST YEAR HAVE YOU NEEDED AN ALCOHOLIC DRINK FIRST THING IN THE MORNING TO GET YOURSELF GOING AFTER A NIGHT OF HEAVY DRINKING: 0
HAVE YOU OR SOMEONE ELSE BEEN INJURED AS A RESULT OF YOUR DRINKING: 0
HOW OFTEN DO YOU HAVE SIX OR MORE DRINKS ON ONE OCCASION: 0
HOW OFTEN DURING THE LAST YEAR HAVE YOU FAILED TO DO WHAT WAS NORMALLY EXPECTED FROM YOU BECAUSE OF DRINKING: 0
HAS A RELATIVE, FRIEND, DOCTOR, OR ANOTHER HEALTH PROFESSIONAL EXPRESSED CONCERN ABOUT YOUR DRINKING OR SUGGESTED YOU CUT DOWN: 0
AUDIT TOTAL SCORE: 3
AUDIT-C TOTAL SCORE: 3

## 2021-06-09 ASSESSMENT — PATIENT HEALTH QUESTIONNAIRE - PHQ9
1. LITTLE INTEREST OR PLEASURE IN DOING THINGS: 0
SUM OF ALL RESPONSES TO PHQ9 QUESTIONS 1 & 2: 0
SUM OF ALL RESPONSES TO PHQ QUESTIONS 1-9: 0
SUM OF ALL RESPONSES TO PHQ QUESTIONS 1-9: 0
2. FEELING DOWN, DEPRESSED OR HOPELESS: 0
SUM OF ALL RESPONSES TO PHQ QUESTIONS 1-9: 0
SUM OF ALL RESPONSES TO PHQ9 QUESTIONS 1 & 2: 0
SUM OF ALL RESPONSES TO PHQ QUESTIONS 1-9: 0
2. FEELING DOWN, DEPRESSED OR HOPELESS: 0
1. LITTLE INTEREST OR PLEASURE IN DOING THINGS: 0

## 2021-06-09 ASSESSMENT — SOCIAL DETERMINANTS OF HEALTH (SDOH): HOW HARD IS IT FOR YOU TO PAY FOR THE VERY BASICS LIKE FOOD, HOUSING, MEDICAL CARE, AND HEATING?: NOT HARD AT ALL

## 2021-06-09 NOTE — PATIENT INSTRUCTIONS
Personalized Preventive Plan for Ananth Macdonald - 6/9/2021  Medicare offers a range of preventive health benefits. Some of the tests and screenings are paid in full while other may be subject to a deductible, co-insurance, and/or copay. Some of these benefits include a comprehensive review of your medical history including lifestyle, illnesses that may run in your family, and various assessments and screenings as appropriate. After reviewing your medical record and screening and assessments performed today your provider may have ordered immunizations, labs, imaging, and/or referrals for you. A list of these orders (if applicable) as well as your Preventive Care list are included within your After Visit Summary for your review. Other Preventive Recommendations:    · A preventive eye exam performed by an eye specialist is recommended every 1-2 years to screen for glaucoma; cataracts, macular degeneration, and other eye disorders. · A preventive dental visit is recommended every 6 months. · Try to get at least 150 minutes of exercise per week or 10,000 steps per day on a pedometer . · Order or download the FREE \"Exercise & Physical Activity: Your Everyday Guide\" from [employer]. Call 0-151.273.9805 or search [employer] online. · You need 1959-1793 mg of calcium and 8687-9620 IU of vitamin D per day. It is possible to meet your calcium requirement with diet alone, but a vitamin D supplement is usually necessary to meet this goal.  · When exposed to the sun, use a sunscreen that protects against both UVA and UVB radiation with an SPF of 30 or greater. Reapply every 2 to 3 hours or after sweating, drying off with a towel, or swimming. · Always wear a seat belt when traveling in a car. Always wear a helmet when riding a bicycle or motorcycle.     ---    Stop Lisinopril, start Losartan instead  Give it a week, and if not better then Flonase  If still not better, ENT in 2 weeks    Sleep study    Pneumovax today    Shingrix at pharmacy - 0, 2-6 months    dexa  [id number]    Labs in one month

## 2021-06-09 NOTE — PROGRESS NOTES
Justin Shukla (:  1955) is a 76 y.o. female, here for evaluation of the following chief complaint(s):    Medicare AWV (Phlegm in throat.)      ASSESSMENT/PLAN:  1. Routine general medical examination at a health care facility  2. Sleep apnea, unspecified type  Patient wants to get established in [facility] for sleep medicine, instead of [address] Anali Green MD, Sleep Medicine, [facility]  3. Throat irritation  We will trial patient off lisinopril. If this does not resolve the issue, will consider Flonase and omeprazole, and then   ENT  -     Santino Peterson MD, Otolaryngology, [facility]  4. Elevated liver enzymes  -     Hepatic Function Panel; Future  5. Pre-diabetes  -     Hemoglobin A1C; Future  6. Essential hypertension   Continue hydrochlorothiazide. Discontinue lisinopril and change to losartan instead. -     losartan (COZAAR) 50 MG tablet; Take 1 tablet by mouth daily, Disp-30 tablet, R-5Normal    Hyperlipidemia, unspecified hyperlipidemia type  Stable on rosuvastatin     Depression, unspecified depression type  Well-controlled on Effexor     HM-  Pneumovax today  Shingrix at pharmacy - 0, 2-6 months  dexa      Medicare Annual Wellness Visit  Name: Mitchell West Date: 2021   MRN: [id number] Sex: Female   Age: 76 y.o. Ethnicity: Non-/Non    : 1955 Race: Quynh Warren is here for Medicare AWV (Phlegm in throat.)    Screenings for behavioral, psychosocial and functional/safety risks, and cognitive dysfunction are all negative except as indicated below. These results, as well as other patient data from the [address] form, are documented in Flowsheets linked to this Encounter. Patient complains she feels like there is a piece of phlegm in her throat that will not go away for the past month. Of note, she is on lisinopril. She has not tried Flonase yet.       Allergies   Allergen Reactions    Cephalosporins Hives and right foot    HYSTERECTOMY  2005    JOINT REPLACEMENT Right 2011    PARTIAL HYSTERECTOMY  2006    still w cervix and ovaries    SEPTOPLASTY      SUBTOTAL COLECTOMY  09/2007    perforated colon     TONSILLECTOMY           Family History   Problem Relation Age of Onset    Lung Cancer Mother     Prostate Cancer Father     Prostate Cancer Brother     Breast Cancer Maternal Cousin     Dementia Paternal Aunt        CareTeam (Including outside providers/suppliers regularly involved in providing care):   Patient Care Team:  Betsy Lozano MD as PCP - General (Internal Medicine)  Betsy Lozano MD as PCP - [facility] Empaneled Provider    Wt Readings from Last 3 Encounters:   06/09/21 189 lb (85.7 kg)   03/04/21 193 lb (87.5 kg)   09/02/20 194 lb (88 kg)     Vitals:    06/09/21 0830   BP: 130/88   Site: Right Upper Arm   Position: Sitting   Cuff Size: Large Adult   Pulse: 82   Resp: 16   Temp: 98.3 °F (36.8 °C)   TempSrc: Oral   SpO2: 98%   Weight: 189 lb (85.7 kg)   Height: 5' 9\" (1.753 m)     Body mass index is 27.91 kg/m². Based upon direct observation of the patient, evaluation of cognition reveals memory intact    Physical exam: Patient is in no acute distress. Is normocephalic atraumatic. Heart is regular rate and rhythm. Lungs are clear to auscultation. Abdomen is soft with positive bowel sounds. Calves are without edema. Neurologic exam is nonfocal.    Patient's complete Health Risk Assessment and screening values have been reviewed and are found in Flowsheets. The following problems were reviewed today and where indicated follow up appointments were made and/or referrals ordered. Positive Risk Factor Screenings with Interventions:            General Health and ACP:  General  In general, how would you say your health is?: Good  In the past 7 days, have you experienced any of the following?  New or Increased Pain, New or Increased Fatigue, Loneliness, Social Isolation, Stress or Anger?: None of These  Do you get the social and emotional support that you need?: Yes  Do you have a Living Will?: Yes  Advance Directives     Power of  Living Will ACP-Advance Directive ACP-Power of     Not on File Not on File Not on File Not on File      General Health Risk Interventions: none  ·       Safety:  Safety  Do you have working smoke detectors?: Yes  Have all throw rugs been removed or fastened?: Yes  Do you have non-slip mats or surfaces in all bathtubs/showers?: (!) No  Do all of your stairways have a railing or banister?: Yes  Are your doorways, halls and stairs free of clutter?: Yes  Do you always fasten your seatbelt when you are in a car?: Yes  Safety Interventions:  · Home safety tips provided     Personalized Preventive Plan   Current Health Maintenance Status  Immunization History   Administered Date(s) Administered    COVID-19, Pfizer, PF, 30mcg/0.3mL 03/19/2021    Influenza, Triv, inactivated, subunit, adjuvanted, IM (Fluad 65 yrs and older) 10/23/2019    Pneumococcal Conjugate 13-valent ([id number]) 10/23/2019    Pneumococcal Polysaccharide ([id number]) 06/09/2021    Td (Adult), 2 Lf Tetanus Toxoid, Pf (Td, Absorbed) 08/28/2017    Tdap (Boostrix, Adacel) 02/22/2007    Zoster Live (Zostavax) 08/14/2012        Health Maintenance   Topic Date Due    DEXA (modify frequency per FRAX score)  Never done    Shingles Vaccine (2 of 3) 10/09/2012    Annual Wellness Visit (AWV)  Never done    Flu vaccine (Season Ended) 09/01/2021    A1C test (Diabetic or Prediabetic)  09/02/2021    Lipid screen  04/12/2022    Potassium monitoring  04/12/2022    Creatinine monitoring  04/12/2022    Breast cancer screen  03/29/2023    DTaP/Tdap/Td vaccine (3 - Td or Tdap) 08/28/2027    Colon cancer screen colonoscopy  04/19/2031    Pneumococcal 65+ years Vaccine  Completed    COVID-19 Vaccine  Completed    Hepatitis C screen  Completed    Hepatitis A vaccine  Aged Out    Hepatitis B vaccine  Aged Out    Hib vaccine  Aged Out    Meningococcal (ACWY) vaccine  Aged Out     Recommendations for [employer] Due: see orders and patient instructions/AVS.  . Recommended screening schedule for the next 5-10 years is provided to the patient in written form: see Patient Instructions/AVS.    Aayush Molina was seen today for medicare aw. Diagnoses and all orders for this visit:    Routine general medical examination at a health care facility    Sleep apnea, unspecified type  -     Mary Elizalde MD, Sleep Medicine, [facility]    Throat irritation  -     Edwin Ruiz MD, Otolaryngology, [facility]    Elevated liver enzymes  -     Hepatic Function Panel; Future    Pre-diabetes  -     Hemoglobin A1C; Future    Essential hypertension  -     losartan (COZAAR) 50 MG tablet; Take 1 tablet by mouth daily    Other orders  -     fexofenadine (ALLEGRA) 180 MG tablet;  Take 1 tablet by mouth daily  -     Pneumococcal polysaccharide vaccine 23-valent greater than or equal to 3yo subcutaneous/IM

## 2021-07-16 ENCOUNTER — TELEPHONE (OUTPATIENT)
Dept: INTERNAL MEDICINE CLINIC | Age: 66
End: 2021-07-16

## 2021-07-16 NOTE — TELEPHONE ENCOUNTER
Patient advised and given name and phone number for [facility] @ [phone] and to ask for Dr. Evert Phan or whoever has openings. Patient advised to call if any problems getting scheduled.

## 2021-07-16 NOTE — TELEPHONE ENCOUNTER
Please see 6/30/21 "[employer] message. Noticed growth on right leg, above knee for approx 1 month. Not painful. Tried to send a picture of it but does not look like it went through. She will try again. Patient concerned since it is growing.  Would like a referral to a dermatologist.

## 2021-07-16 NOTE — TELEPHONE ENCOUNTER
So sorry we never got back. Would suggest the [facility]. Call [phone]. Can ask for Dr. Tana Gomez or whoever has openings. Let us know if you have trouble scheduling.

## 2021-07-29 ENCOUNTER — TELEPHONE (OUTPATIENT)
Dept: INTERNAL MEDICINE CLINIC | Age: 66
End: 2021-07-29

## 2021-07-29 NOTE — TELEPHONE ENCOUNTER
----- Message from [employer] sent at 7/29/2021 10:09 AM EDT -----  Subject: Message to Provider    QUESTIONS  Information for Provider? Pt states that she's experiencing sinus   drainage, lots of mucus, and cough. Would like to be seen as soon as   possible. Please advise   ---------------------------------------------------------------------------  --------------  CALL BACK INFO  What is the best way for the office to contact you? OK to leave message on   voicemail  Preferred Call Back Phone Number? [phone]  ---------------------------------------------------------------------------  --------------  SCRIPT ANSWERS  Relationship to Patient? Self  Are you currently unable to finish sentences due to any difficulty   breathing? No  Are you unable to swallow liquids? No  Are you having fevers (100.4 or greater), chills, or sweats? No  Do you have COPD, asthma or a chronic lung condition? No  Have your symptoms been present for more than 5 days?  No

## 2021-08-02 ENCOUNTER — TELEPHONE (OUTPATIENT)
Dept: INTERNAL MEDICINE CLINIC | Age: 66
End: 2021-08-02

## 2021-08-03 NOTE — TELEPHONE ENCOUNTER
LVM for patient to call back to confirm her intentions to either cancel or keep her appointment scheduled for today.

## 2021-08-03 NOTE — TELEPHONE ENCOUNTER
I failed to documentation that I left a message @ 9:00 this morning, for patient to call the office to confirm if she will be keeping her appointment today or Cancelling.

## 2021-09-15 ENCOUNTER — OFFICE VISIT (OUTPATIENT)
Dept: INTERNAL MEDICINE CLINIC | Age: 66
End: 2021-09-15
Payer: MEDICARE

## 2021-09-15 VITALS
BODY MASS INDEX: 29.3 KG/M2 | RESPIRATION RATE: 16 BRPM | SYSTOLIC BLOOD PRESSURE: 145 MMHG | OXYGEN SATURATION: 97 % | HEART RATE: 82 BPM | DIASTOLIC BLOOD PRESSURE: 90 MMHG | WEIGHT: 198.4 LBS

## 2021-09-15 DIAGNOSIS — R73.03 PRE-DIABETES: ICD-10-CM

## 2021-09-15 DIAGNOSIS — E78.5 HYPERLIPIDEMIA, UNSPECIFIED HYPERLIPIDEMIA TYPE: ICD-10-CM

## 2021-09-15 DIAGNOSIS — F32.A DEPRESSION, UNSPECIFIED DEPRESSION TYPE: ICD-10-CM

## 2021-09-15 DIAGNOSIS — I10 ESSENTIAL HYPERTENSION: Primary | ICD-10-CM

## 2021-09-15 PROCEDURE — 1123F ACP DISCUSS/DSCN MKR DOCD: CPT | Performed by: INTERNAL MEDICINE

## 2021-09-15 PROCEDURE — G8400 PT W/DXA NO RESULTS DOC: HCPCS | Performed by: INTERNAL MEDICINE

## 2021-09-15 PROCEDURE — G0008 ADMIN INFLUENZA VIRUS VAC: HCPCS | Performed by: INTERNAL MEDICINE

## 2021-09-15 PROCEDURE — G8417 CALC BMI ABV UP PARAM F/U: HCPCS | Performed by: INTERNAL MEDICINE

## 2021-09-15 PROCEDURE — 3017F COLORECTAL CA SCREEN DOC REV: CPT | Performed by: INTERNAL MEDICINE

## 2021-09-15 PROCEDURE — 1090F PRES/ABSN URINE INCON ASSESS: CPT | Performed by: INTERNAL MEDICINE

## 2021-09-15 PROCEDURE — 1036F TOBACCO NON-USER: CPT | Performed by: INTERNAL MEDICINE

## 2021-09-15 PROCEDURE — 99214 OFFICE O/P EST MOD 30 MIN: CPT | Performed by: INTERNAL MEDICINE

## 2021-09-15 PROCEDURE — 4040F PNEUMOC VAC/ADMIN/RCVD: CPT | Performed by: INTERNAL MEDICINE

## 2021-09-15 PROCEDURE — G8427 DOCREV CUR MEDS BY ELIG CLIN: HCPCS | Performed by: INTERNAL MEDICINE

## 2021-09-15 PROCEDURE — 90694 VACC AIIV4 NO PRSRV 0.5ML IM: CPT | Performed by: INTERNAL MEDICINE

## 2021-09-15 RX ORDER — LOSARTAN POTASSIUM 50 MG/1
75 TABLET ORAL DAILY
Qty: 135 TABLET | Refills: 1 | Status: SHIPPED | OUTPATIENT
Start: 2021-09-15 | End: 2021-10-07

## 2021-09-15 RX ORDER — LOSARTAN POTASSIUM 50 MG/1
75 TABLET ORAL DAILY
Qty: 45 TABLET | Refills: 5 | Status: SHIPPED | OUTPATIENT
Start: 2021-09-15 | End: 2021-09-15 | Stop reason: SDUPTHER

## 2021-09-15 NOTE — PROGRESS NOTES
Junito Maya (:  1955) is a 78 y.o. female, here for evaluation of the following chief complaint(s):    Follow-up      ASSESSMENT/PLAN:  1. Essential hypertension  Patient to work on weight loss. Increase losartan to 75 mg daily continue hydrochlorothiazide. -     losartan (COZAAR) 50 MG tablet; Take 1.5 tablets by mouth daily, Disp-135 tablet, R-1Normal  2. Depression, unspecified depression type  Stable on Effexor  3. Hyperlipidemia, unspecified hyperlipidemia type  Stable on Crestor. Recheck liver enzymes  4. Pre-diabetes  Check A1c       Return in about 3 months (around 12/15/2021). SUBJECTIVE/OBJECTIVE:  HPI   Patient is here for routine follow-up. She feels well. Her cousin was visiting from out of town and ate out last week and she thinks this is why her weight and blood pressure may be up. Otherwise she does not complain of chest pain or shortness of breath. She does have eye redness and irritation which she thinks is from allergies. She is using Opcon-A. She has an upcoming visit with ophthalmology.  No vision changes      Past Medical History:   Diagnosis Date    Abnormal EKG     saw cardio, wnl stress test    Allergic rhinitis     spring trees    Depression     Endometriosis     Essential hypertension     H/O mammogram 2018    [place]    Hyperlipidemia     Pap smear for cervical cancer screening 2016    Plantar fasciitis     Recurrent sinus infections     Sleep apnea     cpap    Visit for review of DEXA scan     10/16-wnl T score       Current Outpatient Medications   Medication Sig Dispense Refill    losartan (COZAAR) 50 MG tablet Take 1.5 tablets by mouth daily 135 tablet 1    hydroCHLOROthiazide (HYDRODIURIL) 25 MG tablet TAKE 1 TABLET BY MOUTH EVERY DAY 90 tablet 1    venlafaxine (EFFEXOR XR) 150 MG extended release capsule TAKE 1 CAPSULE BY MOUTH EVERY DAY 90 capsule 1    rosuvastatin (CRESTOR) 40 MG tablet TAKE 1 TABLET BY MOUTH EVERY DAY 90 tablet 1  calcium citrate (CALCITRATE) 250 MG TABS tablet Take 1 tablet by mouth daily 60 tablet 0    Omega-3 Fatty Acids (FISH OIL) 1000 MG CAPS Take 1 capsule by mouth daily      Cyanocobalamin (VITAMIN B12 PO) Take by mouth      VITAMIN D PO Take by mouth      Multiple Vitamins-Minerals (MULTIVITAMIN ADULT PO) Take by mouth      vitamin C (ASCORBIC ACID) 500 MG tablet Take 500 mg by mouth daily       No current facility-administered medications for this visit. Physical Exam  Vitals and nursing note reviewed. Constitutional:       General: She is not in acute distress. Appearance: She is well-developed. HENT:      Head: Normocephalic and atraumatic. Right Ear: External ear normal.      Left Ear: External ear normal.   Eyes:      General: No scleral icterus. Extraocular Movements: Extraocular movements intact. Neck:      Thyroid: No thyromegaly. Cardiovascular:      Rate and Rhythm: Normal rate and regular rhythm. Heart sounds: No murmur heard. Pulmonary:      Effort: No respiratory distress. Breath sounds: Normal breath sounds. No wheezing or rales. Abdominal:      General: Bowel sounds are normal. There is no distension. Palpations: Abdomen is soft. Tenderness: There is no abdominal tenderness. Musculoskeletal:         General: Normal range of motion. Lymphadenopathy:      Cervical: No cervical adenopathy. Skin:     General: Skin is warm and dry. Neurological:      Mental Status: She is alert and oriented to person, place, and time. Cranial Nerves: No cranial nerve deficit. Sensory: No sensory deficit. Coordination: Coordination normal.   Psychiatric:         Behavior: Behavior normal.               This note was generated completely or in part utilizing Dragon dictation speech recognition software. Occasionally, words are mistranscribed and despite editing, the text may contain inaccuracies due to incorrect word recognition.   If further clarification is needed please contact the office at [phone]          An electronic signature was used to authenticate this note.     --Paula Miller MD

## 2021-09-15 NOTE — PATIENT INSTRUCTIONS
Labs    Dexa  [id number]    Flu shot    3d shot-mid November  Due Shingrix    Trial of Zaditor for eye allergies instead of Opcon-A    Increase to Losartan 75 mg daily

## 2021-10-07 DIAGNOSIS — I10 ESSENTIAL HYPERTENSION: ICD-10-CM

## 2021-10-07 RX ORDER — LOSARTAN POTASSIUM 50 MG/1
TABLET ORAL
Qty: 90 TABLET | Refills: 1 | Status: SHIPPED | OUTPATIENT
Start: 2021-10-07 | End: 2022-02-15 | Stop reason: SDUPTHER

## 2021-10-07 NOTE — TELEPHONE ENCOUNTER
Last appointment: 9/15/2021  Next appointment: 12/8/2021  Last refill: 9/15/2021    It looks like it's too early to refill the Losartan.

## 2021-10-28 ENCOUNTER — TELEPHONE (OUTPATIENT)
Dept: INTERNAL MEDICINE CLINIC | Age: 66
End: 2021-10-28

## 2021-10-28 NOTE — TELEPHONE ENCOUNTER
Is patient having any other sx? Sore throat? Cough? Fever? Recent covid exposure?   Would have her schedule appointment for VV to discuss

## 2021-10-28 NOTE — TELEPHONE ENCOUNTER
Patient called to speak to the doctor about her throat issues she has been having since Monday. She states she feels like its something crawling in her throat she doesn't know how to describe it. But she would like to know what if anything she could take for this? She also states she had some concerns about her blood pressure it was last checked last night at [facility] it was 153\100 but she doesn't trust that cuff. So she will check that again tonight.

## 2021-10-28 NOTE — TELEPHONE ENCOUNTER
None of the above just a tickle in her throat causing her to cough a lot she was not exposed to covid but had a negative result last week.    Has been scheduled

## 2021-11-08 ENCOUNTER — OFFICE VISIT (OUTPATIENT)
Dept: ENT CLINIC | Age: 66
End: 2021-11-08
Payer: MEDICARE

## 2021-11-08 VITALS
WEIGHT: 198.4 LBS | SYSTOLIC BLOOD PRESSURE: 165 MMHG | DIASTOLIC BLOOD PRESSURE: 107 MMHG | TEMPERATURE: 96.2 F | HEIGHT: 69 IN | HEART RATE: 85 BPM | BODY MASS INDEX: 29.38 KG/M2

## 2021-11-08 DIAGNOSIS — K21.9 LARYNGOPHARYNGEAL REFLUX (LPR): ICD-10-CM

## 2021-11-08 DIAGNOSIS — J30.9 ALLERGIC RHINITIS, UNSPECIFIED SEASONALITY, UNSPECIFIED TRIGGER: Primary | ICD-10-CM

## 2021-11-08 PROCEDURE — 4040F PNEUMOC VAC/ADMIN/RCVD: CPT | Performed by: OTOLARYNGOLOGY

## 2021-11-08 PROCEDURE — G8484 FLU IMMUNIZE NO ADMIN: HCPCS | Performed by: OTOLARYNGOLOGY

## 2021-11-08 PROCEDURE — G8417 CALC BMI ABV UP PARAM F/U: HCPCS | Performed by: OTOLARYNGOLOGY

## 2021-11-08 PROCEDURE — G8400 PT W/DXA NO RESULTS DOC: HCPCS | Performed by: OTOLARYNGOLOGY

## 2021-11-08 PROCEDURE — 3017F COLORECTAL CA SCREEN DOC REV: CPT | Performed by: OTOLARYNGOLOGY

## 2021-11-08 PROCEDURE — 1123F ACP DISCUSS/DSCN MKR DOCD: CPT | Performed by: OTOLARYNGOLOGY

## 2021-11-08 PROCEDURE — 99202 OFFICE O/P NEW SF 15 MIN: CPT | Performed by: OTOLARYNGOLOGY

## 2021-11-08 PROCEDURE — 31575 DIAGNOSTIC LARYNGOSCOPY: CPT | Performed by: OTOLARYNGOLOGY

## 2021-11-08 PROCEDURE — 1036F TOBACCO NON-USER: CPT | Performed by: OTOLARYNGOLOGY

## 2021-11-08 PROCEDURE — 1090F PRES/ABSN URINE INCON ASSESS: CPT | Performed by: OTOLARYNGOLOGY

## 2021-11-08 PROCEDURE — G8428 CUR MEDS NOT DOCUMENT: HCPCS | Performed by: OTOLARYNGOLOGY

## 2021-11-08 ASSESSMENT — ENCOUNTER SYMPTOMS
DIARRHEA: 0
CHOKING: 0
EYE PAIN: 0
SORE THROAT: 0
SHORTNESS OF BREATH: 0
TROUBLE SWALLOWING: 0
EYE REDNESS: 0
SINUS PRESSURE: 0
RHINORRHEA: 0
COUGH: 1
EYE ITCHING: 0
FACIAL SWELLING: 0
SINUS PAIN: 0
VOICE CHANGE: 0
NAUSEA: 0

## 2021-11-08 NOTE — PROGRESS NOTES
Subjective:      Patient ID: Yoandy Meier is a 78 y.o. female. HPI  Chief Complaint   Patient presents with    throat problem       History of Present Illness  Head/Neck    Kaitlin Echeverria is a(n) 78 y.o. female who presents with a 2 week history of a tickle in the throat with a cough. No recent cold or flu. Denies heartburn or indigestion. Quit smoking in    Pain: No  Location: throat  Onset: As above  Timing: Imrpoving  Frequency: daily  Otalgia: No  Odynophagia: No  Dysphagia: No  Voice Changes: No  Lumps in neck: No  Modifying Factors: Former smoker  Associates Signs and Symptoms: No dyspepsia. Denies current allergies. Patient Active Problem List   Diagnosis    Depression    Endometriosis    Essential hypertension    Hyperlipidemia    Sleep apnea     Past Surgical History:   Procedure Laterality Date    ANUS SURGERY      anal fissure    COLONOSCOPY  2014    recheck     COLONOSCOPY  2019    fu 10 yrs    CYST REMOVAL      right foot    HYSTERECTOMY  2005    JOINT REPLACEMENT Right 2011    PARTIAL HYSTERECTOMY  2006    still w cervix and ovaries    SEPTOPLASTY      SUBTOTAL COLECTOMY  2007    perforated colon     TONSILLECTOMY       Family History   Problem Relation Age of Onset    Lung Cancer Mother     Prostate Cancer Father     Prostate Cancer Brother     Breast Cancer Maternal Cousin     Dementia Paternal Aunt      Social History     Socioeconomic History    Marital status: Single     Spouse name: Not on file    Number of children: 0    Years of education: Not on file    Highest education level: Not on file   Occupational History    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     Comment: horticulture   Tobacco Use    Smoking status: Former Smoker     Quit date: 1986     Years since quittin.8    Smokeless tobacco: Never Used   Substance and Sexual Activity    Alcohol use:  Yes    Drug use: Never    Sexual activity: Not Currently   Other Topics Concern    Not on file   Social History Narrative    Lives alone with dog (kaushal)     Social Determinants of Health     Financial Resource Strain: Low Risk     Difficulty of Paying Living Expenses: Not hard at all   Food Insecurity: No Food Insecurity    Worried About Running Out of Food in the Last Year: Never true    Jose Francisco of Food in the Last Year: Never true   Transportation Needs:     Lack of Transportation (Medical): Not on file    Lack of Transportation (Non-Medical): Not on file   Physical Activity:     Days of Exercise per Week: Not on file    Minutes of Exercise per Session: Not on file   Stress:     Feeling of Stress : Not on file   Social Connections:     Frequency of Communication with Friends and Family: Not on file    Frequency of Social Gatherings with Friends and Family: Not on file    Attends Jewish Services: Not on file    Active Member of [address] [place] [address] [employer] or Organizations: Not on file    Attends Club or Organization Meetings: Not on file    Marital Status: Not on file   Intimate Partner Violence:     Fear of Current or Ex-Partner: Not on file    Emotionally Abused: Not on file    Physically Abused: Not on file    Sexually Abused: Not on file   Housing Stability:     Unable to Pay for Housing in the Last Year: Not on file    Number of [place] in the Last Year: Not on file    Unstable Housing in the Last Year: Not on file       DRUG/FOOD ALLERGIES: Cephalosporins, Penicillins, Atorvastatin, and Lisinopril    CURRENT MEDICATIONS  Prior to Admission medications    Medication Sig Start Date End Date Taking?  Authorizing Provider   losartan (COZAAR) 50 MG tablet TAKE 1 TABLET BY MOUTH EVERY DAY 10/7/21  Yes Shelli Albrecht MD   hydroCHLOROthiazide (HYDRODIURIL) 25 MG tablet TAKE 1 TABLET BY MOUTH EVERY DAY 4/12/21  Yes Shelli Albrecht MD   venlafaxine (EFFEXOR XR) 150 MG extended release capsule TAKE 1 CAPSULE BY MOUTH EVERY DAY 3/17/21  Yes Shelli Albrecht MD   rosuvastatin (CRESTOR) 40 MG tablet TAKE 1 TABLET BY MOUTH EVERY DAY 3/17/21  Yes Sylvie Leventhal, MD   calcium citrate (CALCITRATE) 250 MG TABS tablet Take 1 tablet by mouth daily 9/2/20  Yes Carmelita Medina MD   Omega-3 Fatty Acids (FISH OIL) 1000 MG CAPS Take 1 capsule by mouth daily 2/7/20  Yes Sylvie Leventhal, MD   Cyanocobalamin (VITAMIN B12 PO) Take by mouth   Yes Historical Provider, MD   VITAMIN D PO Take by mouth   Yes Historical Provider, MD   Multiple Vitamins-Minerals (MULTIVITAMIN ADULT PO) Take by mouth   Yes Historical Provider, MD   vitamin C (ASCORBIC ACID) 500 MG tablet Take 500 mg by mouth daily   Yes Historical Provider, MD       Lab Studies:No results found for: WBC, HGB, HCT, MCV, PLT  Lab Results   Component Value Date    GLUCOSE 115 (H) 09/02/2020    BUN 18 04/12/2021    CREATININE 0.7 04/12/2021    K 4.6 04/12/2021     04/12/2021     04/12/2021    CALCIUM 9.7 04/12/2021     No results found for: MG  No results found for: PHOS  Lab Results   Component Value Date    ALKPHOS 67 04/12/2021    ALT 35 04/12/2021    AST 44 (H) 04/12/2021    BILITOT 0.4 04/12/2021    PROT 7.1 04/12/2021         Review of Systems   Constitutional: Negative for activity change, appetite change, chills, fatigue and fever. HENT: Negative for congestion, ear discharge, ear pain, facial swelling, hearing loss, nosebleeds, postnasal drip, rhinorrhea, sinus pressure, sinus pain, sneezing, sore throat, tinnitus, trouble swallowing and voice change. Eyes: Negative for pain, redness, itching and visual disturbance. Respiratory: Positive for cough. Negative for choking and shortness of breath. Gastrointestinal: Negative for diarrhea and nausea. Endocrine: Negative for cold intolerance and heat intolerance. Objective:   Physical Exam  Constitutional:       General: She is not in acute distress. Appearance: She is well-developed.    HENT:      Head: Not macrocephalic and not provide this procedure may lead to late detection of significant chronic benign disease, acute exacerbation, resolution or failure of early diagnosis of recurrent cancer. The procedure report is present in the body of the chart. Flexible Laryngoscopy    Pre op: cough. Post op: LPR  Procedure : FlexibleLaryngoscopy  Surgeon: Ruthie Liu  Anesthesia: Afrin with 2% lidocaine  Estimated Blood Loss: None    Procedure:   Flexible Laryngoscopy     After obtaining consent, the patient was placed in the examination chair in the upright position. Decongestant and topical anesthetic was sprayed in the After allowing adequate time for hemostatic effect, the flexible 4 mm laryngoscope was passed via the     Nasal Septum: normal;   Nasal Findings: mild edema and post nasal drainage;   Nasopharynx: normal   Eustachian Tube: normal   Oropharynx: normal   Base of Tongue: normal   Epiglottis: normal   True Vocal Cord normal   False Vocal Cord: normal   True Vocal Cord Movement: normal   Hypopharynx Mucosa: normal  Arytenoids: mild pachydermia     * Patient tolerated the procedure well with no complications   * Patient was instructed not to eat for 30 minutes following procedure. * Patient was instructed that they may notice minor bleeding. Attestation:   I was present for the entire viewing, including introduction and removal of the scope. Assessment:       Diagnosis Orders   1. Allergic rhinitis, unspecified seasonality, unspecified trigger     2. Laryngopharyngeal reflux (LPR)             Plan:      Add Pretz nasal spray. Trial tums 2 tablets TID for one week. Does not want medications if possible. Follow up as needed.              Miya De La Rosa MD

## 2021-11-12 ENCOUNTER — TELEPHONE (OUTPATIENT)
Dept: INTERNAL MEDICINE CLINIC | Age: 66
End: 2021-11-12

## 2021-11-12 NOTE — TELEPHONE ENCOUNTER
Call patient to offer appointment early next week and make sure she is feeling okay.  See Mychart message from today

## 2021-11-18 NOTE — PROGRESS NOTES
Aleja Cr (:  1955) is a 78 y.o. female, here for evaluation of the following chief complaint(s):    Hypertension      ASSESSMENT/PLAN:  1. Essential hypertension  Unclear why blood pressure has been running higher. May relate to recent weight. Continue losartan, hydrochlorothiazide, and atenolol.  -     URINALYSIS  -     Basic Metabolic Panel, Fasting; Future  2. Ganglion cyst of wrist, right  -     Alyssa  Gabriella Sidhu MD, Hand Surgery (Hand, Wrist, Upper Extremity), [place]    Hemal 21 appt    SUBJECTIVE/OBJECTIVE:  HPI   Patient states her blood pressure has been running elevated. See her recent note to me. She had been under some stress with a recent job change but she is happy in her new job. Her weight is up about 5 pounds since early summer. She denies increased salt or decongestants. She has been compliant with her blood pressure medication. Recently she noted a tender cyst at her right wrist.    Review of Systems   Respiratory: Negative for shortness of breath. Cardiovascular: Negative for chest pain. Neurological: Negative for headaches.        Past Medical History:   Diagnosis Date    Abnormal EKG     saw cardio, wnl stress test    Allergic rhinitis     spring trees    Depression     Endometriosis     Essential hypertension     H/O mammogram 2018    [place]    Hyperlipidemia     Pap smear for cervical cancer screening 2016    Plantar fasciitis     Recurrent sinus infections     Sleep apnea     cpap    Visit for review of DEXA scan     10/16-wnl T score       Current Outpatient Medications   Medication Sig Dispense Refill    vitamin D (CHOLECALCIFEROL) 25 MCG (1000 UT) TABS tablet Take 1,000 Units by mouth daily      atenolol (TENORMIN) 25 MG tablet Take 1 tablet by mouth daily 30 tablet 3    losartan (COZAAR) 50 MG tablet TAKE 1 TABLET BY MOUTH EVERY DAY 90 tablet 1    hydroCHLOROthiazide (HYDRODIURIL) 25 MG tablet TAKE 1 TABLET BY MOUTH EVERY DAY 90 tablet 1    venlafaxine (EFFEXOR XR) 150 MG extended release capsule TAKE 1 CAPSULE BY MOUTH EVERY DAY 90 capsule 1    rosuvastatin (CRESTOR) 40 MG tablet TAKE 1 TABLET BY MOUTH EVERY DAY 90 tablet 1    calcium citrate (CALCITRATE) 250 MG TABS tablet Take 1 tablet by mouth daily 60 tablet 0    Omega-3 Fatty Acids (FISH OIL) 1000 MG CAPS Take 1 capsule by mouth daily      Cyanocobalamin (VITAMIN B12 PO) Take by mouth      VITAMIN D PO Take by mouth      Multiple Vitamins-Minerals (MULTIVITAMIN ADULT PO) Take by mouth      vitamin C (ASCORBIC ACID) 500 MG tablet Take 500 mg by mouth daily       No current facility-administered medications for this visit. Physical Exam  Vitals reviewed. Constitutional:       General: She is not in acute distress. HENT:      Head: Normocephalic and atraumatic. Cardiovascular:      Rate and Rhythm: Normal rate and regular rhythm. Pulmonary:      Effort: Pulmonary effort is normal.      Breath sounds: Normal breath sounds. Musculoskeletal:      Right lower leg: No edema. Left lower leg: No edema. Comments: Right wrist- ganglion cyst   Neurological:      General: No focal deficit present. Mental Status: She is alert and oriented to person, place, and time. This note was generated completely or in part utilizing Dragon dictation speech recognition software. Occasionally, words are mistranscribed and despite editing, the text may contain inaccuracies due to incorrect word recognition. If further clarification is needed please contact the office at [phone]          An electronic signature was used to authenticate this note.     --Ashley Sharp MD

## 2021-11-19 ENCOUNTER — OFFICE VISIT (OUTPATIENT)
Dept: INTERNAL MEDICINE CLINIC | Age: 66
End: 2021-11-19
Payer: MEDICARE

## 2021-11-19 VITALS
HEART RATE: 97 BPM | WEIGHT: 196.4 LBS | BODY MASS INDEX: 28.12 KG/M2 | SYSTOLIC BLOOD PRESSURE: 138 MMHG | DIASTOLIC BLOOD PRESSURE: 80 MMHG | HEIGHT: 70 IN | OXYGEN SATURATION: 98 %

## 2021-11-19 DIAGNOSIS — M67.431 GANGLION CYST OF WRIST, RIGHT: ICD-10-CM

## 2021-11-19 DIAGNOSIS — I10 ESSENTIAL HYPERTENSION: Primary | ICD-10-CM

## 2021-11-19 LAB
BILIRUBIN URINE: NEGATIVE
BLOOD, URINE: NEGATIVE
CLARITY: CLEAR
COLOR: YELLOW
GLUCOSE URINE: NEGATIVE MG/DL
KETONES, URINE: NEGATIVE MG/DL
LEUKOCYTE ESTERASE, URINE: NEGATIVE
MICROSCOPIC EXAMINATION: NORMAL
NITRITE, URINE: NEGATIVE
PH UA: 7 (ref 5–8)
PROTEIN UA: NEGATIVE MG/DL
SPECIFIC GRAVITY UA: 1.02 (ref 1–1.03)
URINE TYPE: NORMAL
UROBILINOGEN, URINE: 0.2 E.U./DL

## 2021-11-19 PROCEDURE — 1090F PRES/ABSN URINE INCON ASSESS: CPT | Performed by: INTERNAL MEDICINE

## 2021-11-19 PROCEDURE — 1036F TOBACCO NON-USER: CPT | Performed by: INTERNAL MEDICINE

## 2021-11-19 PROCEDURE — 4040F PNEUMOC VAC/ADMIN/RCVD: CPT | Performed by: INTERNAL MEDICINE

## 2021-11-19 PROCEDURE — G8427 DOCREV CUR MEDS BY ELIG CLIN: HCPCS | Performed by: INTERNAL MEDICINE

## 2021-11-19 PROCEDURE — 81003 URINALYSIS AUTO W/O SCOPE: CPT | Performed by: INTERNAL MEDICINE

## 2021-11-19 PROCEDURE — 1123F ACP DISCUSS/DSCN MKR DOCD: CPT | Performed by: INTERNAL MEDICINE

## 2021-11-19 PROCEDURE — G8400 PT W/DXA NO RESULTS DOC: HCPCS | Performed by: INTERNAL MEDICINE

## 2021-11-19 PROCEDURE — 3017F COLORECTAL CA SCREEN DOC REV: CPT | Performed by: INTERNAL MEDICINE

## 2021-11-19 PROCEDURE — G8417 CALC BMI ABV UP PARAM F/U: HCPCS | Performed by: INTERNAL MEDICINE

## 2021-11-19 PROCEDURE — 99213 OFFICE O/P EST LOW 20 MIN: CPT | Performed by: INTERNAL MEDICINE

## 2021-11-19 PROCEDURE — G8484 FLU IMMUNIZE NO ADMIN: HCPCS | Performed by: INTERNAL MEDICINE

## 2021-11-19 RX ORDER — ATENOLOL 25 MG/1
25 TABLET ORAL DAILY
Qty: 30 TABLET | Refills: 3 | Status: SHIPPED | OUTPATIENT
Start: 2021-11-19 | End: 2022-03-14

## 2021-11-19 ASSESSMENT — ENCOUNTER SYMPTOMS: SHORTNESS OF BREATH: 0

## 2021-11-19 NOTE — PATIENT INSTRUCTIONS
Labs and Urinalysis     Add Atenolol 25 mg daily    Work on 5 lbs weight loss    Dexa  [id number]    Dr. Erich Gaming orthopedist    Dr. Bobby Posey  [phone]

## 2021-12-07 RX ORDER — HYDROCHLOROTHIAZIDE 25 MG/1
TABLET ORAL
Qty: 90 TABLET | Refills: 1 | Status: SHIPPED | OUTPATIENT
Start: 2021-12-07 | End: 2022-05-31

## 2021-12-07 RX ORDER — VENLAFAXINE HYDROCHLORIDE 150 MG/1
CAPSULE, EXTENDED RELEASE ORAL
Qty: 90 CAPSULE | Refills: 1 | Status: SHIPPED | OUTPATIENT
Start: 2021-12-07 | End: 2022-06-13

## 2021-12-08 ENCOUNTER — OFFICE VISIT (OUTPATIENT)
Dept: INTERNAL MEDICINE CLINIC | Age: 66
End: 2021-12-08
Payer: MEDICARE

## 2021-12-08 VITALS
SYSTOLIC BLOOD PRESSURE: 120 MMHG | HEART RATE: 76 BPM | OXYGEN SATURATION: 97 % | BODY MASS INDEX: 28.58 KG/M2 | DIASTOLIC BLOOD PRESSURE: 80 MMHG | WEIGHT: 193 LBS | HEIGHT: 69 IN

## 2021-12-08 DIAGNOSIS — E78.5 HYPERLIPIDEMIA, UNSPECIFIED HYPERLIPIDEMIA TYPE: ICD-10-CM

## 2021-12-08 DIAGNOSIS — I10 ESSENTIAL HYPERTENSION: Primary | ICD-10-CM

## 2021-12-08 DIAGNOSIS — R73.03 PRE-DIABETES: ICD-10-CM

## 2021-12-08 PROCEDURE — 1090F PRES/ABSN URINE INCON ASSESS: CPT | Performed by: INTERNAL MEDICINE

## 2021-12-08 PROCEDURE — 4040F PNEUMOC VAC/ADMIN/RCVD: CPT | Performed by: INTERNAL MEDICINE

## 2021-12-08 PROCEDURE — G8417 CALC BMI ABV UP PARAM F/U: HCPCS | Performed by: INTERNAL MEDICINE

## 2021-12-08 PROCEDURE — G8484 FLU IMMUNIZE NO ADMIN: HCPCS | Performed by: INTERNAL MEDICINE

## 2021-12-08 PROCEDURE — 3017F COLORECTAL CA SCREEN DOC REV: CPT | Performed by: INTERNAL MEDICINE

## 2021-12-08 PROCEDURE — G8400 PT W/DXA NO RESULTS DOC: HCPCS | Performed by: INTERNAL MEDICINE

## 2021-12-08 PROCEDURE — G8427 DOCREV CUR MEDS BY ELIG CLIN: HCPCS | Performed by: INTERNAL MEDICINE

## 2021-12-08 PROCEDURE — 1036F TOBACCO NON-USER: CPT | Performed by: INTERNAL MEDICINE

## 2021-12-08 PROCEDURE — 1123F ACP DISCUSS/DSCN MKR DOCD: CPT | Performed by: INTERNAL MEDICINE

## 2021-12-08 PROCEDURE — 99213 OFFICE O/P EST LOW 20 MIN: CPT | Performed by: INTERNAL MEDICINE

## 2021-12-08 ASSESSMENT — ENCOUNTER SYMPTOMS: SHORTNESS OF BREATH: 0

## 2021-12-08 NOTE — PROGRESS NOTES
Chema Jett (:  1955) is a 78 y.o. female, here for evaluation of the following chief complaint(s):    3 Month Follow-Up      ASSESSMENT/PLAN:  1. Essential hypertension  Very well controlled today on hydrochlorothiazide, atenolol, losartan  2. Pre-diabetes  Check A1c  3. Hyperlipidemia, unspecified hyperlipidemia type  Check labs on statin  -     LIPID PANEL; Future  ---  . Depression, unspecified depression type  Stable on Effexor  -   Obtain DEXA and Shingrix    Return in about 6 months (around 2022). SUBJECTIVE/OBJECTIVE:  HPI   Patient is here to follow-up recent start of atenolol due to elevated blood pressure last time. She is tolerating it well without side effects. She denies any new symptoms such as fatigue. In fact she states she feels more focused. Review of Systems   Respiratory: Negative for shortness of breath. Cardiovascular: Negative for chest pain. Psychiatric/Behavioral: Negative for dysphoric mood.        Past Medical History:   Diagnosis Date    Abnormal EKG     saw cardio, wnl stress test    Allergic rhinitis     spring trees    Depression     Endometriosis     Essential hypertension     H/O mammogram 2018    [place]    Hyperlipidemia     Pap smear for cervical cancer screening 2016    Plantar fasciitis     Recurrent sinus infections     Sleep apnea     cpap    Visit for review of DEXA scan     10/16-wnl T score       Current Outpatient Medications   Medication Sig Dispense Refill    venlafaxine (EFFEXOR XR) 150 MG extended release capsule TAKE 1 CAPSULE BY MOUTH EVERY DAY 90 capsule 1    hydroCHLOROthiazide (HYDRODIURIL) 25 MG tablet TAKE 1 TABLET BY MOUTH EVERY DAY 90 tablet 1    Cholecalciferol 50 MCG (2000 UT) TABS Take 1,000 Units by mouth daily       atenolol (TENORMIN) 25 MG tablet Take 1 tablet by mouth daily 30 tablet 3    losartan (COZAAR) 50 MG tablet TAKE 1 TABLET BY MOUTH EVERY DAY 90 tablet 1    rosuvastatin (CRESTOR) 40 MG tablet TAKE 1 TABLET BY MOUTH EVERY DAY 90 tablet 1    calcium citrate (CALCITRATE) 250 MG TABS tablet Take 1 tablet by mouth daily 60 tablet 0    Omega-3 Fatty Acids (FISH OIL) 1000 MG CAPS Take 1 capsule by mouth daily      Cyanocobalamin (VITAMIN B12 PO) Take by mouth      VITAMIN D PO Take by mouth      Multiple Vitamins-Minerals (MULTIVITAMIN ADULT PO) Take by mouth      vitamin C (ASCORBIC ACID) 500 MG tablet Take 500 mg by mouth daily       No current facility-administered medications for this visit. Physical Exam  Vitals reviewed. Constitutional:       General: She is not in acute distress. Cardiovascular:      Rate and Rhythm: Normal rate and regular rhythm. Neurological:      General: No focal deficit present. Mental Status: She is alert and oriented to person, place, and time. Gait: Gait normal.   Psychiatric:         Mood and Affect: Mood normal.               This note was generated completely or in part utilizing Dragon dictation speech recognition software. Occasionally, words are mistranscribed and despite editing, the text may contain inaccuracies due to incorrect word recognition. If further clarification is needed please contact the office at [phone]          An electronic signature was used to authenticate this note.     --Alyse Fields MD

## 2021-12-13 RX ORDER — ATENOLOL 25 MG/1
TABLET ORAL
Qty: 30 TABLET | Refills: 3 | OUTPATIENT
Start: 2021-12-13

## 2022-02-07 DIAGNOSIS — E78.5 HYPERLIPIDEMIA, UNSPECIFIED HYPERLIPIDEMIA TYPE: ICD-10-CM

## 2022-02-07 RX ORDER — ROSUVASTATIN CALCIUM 40 MG/1
TABLET, COATED ORAL
Qty: 90 TABLET | Refills: 1 | Status: SHIPPED | OUTPATIENT
Start: 2022-02-07 | End: 2022-08-03

## 2022-02-07 NOTE — TELEPHONE ENCOUNTER
Recent Visits  Date Type Provider Dept   12/08/21 Office Visit Tamica Guerrero MD [facility]   11/19/21 Office Visit [facility]   09/15/21 Office Visit Tamica [facility]ro MD [facility]   06/09/21 Office Visit [facility]   03/04/21 Office Visit [facility]   09/02/20 Office Visit [facility]   Showing recent visits within past 540 days with a meds authorizing provider and meeting all other requirements  Future Appointments  Date Type Provider Dept   06/08/22 Appointment Tam[facility] Guerrero MD [facility]   Showing future appointments within next 150 days with a meds authorizing provider and meeting all other requirements

## 2022-02-15 DIAGNOSIS — I10 ESSENTIAL HYPERTENSION: ICD-10-CM

## 2022-02-15 RX ORDER — LOSARTAN POTASSIUM 50 MG/1
TABLET ORAL
Qty: 90 TABLET | Refills: 1 | Status: SHIPPED | OUTPATIENT
Start: 2022-02-15 | End: 2022-02-17 | Stop reason: SDUPTHER

## 2022-02-15 NOTE — TELEPHONE ENCOUNTER
Patient would like to have the following medication sent to her pharmacy for refill:    losartan (COZAAR) 50 MG tablet TAKE 1 TABLET BY MOUTH EVERY DAY       [id number] [place], [address] [facility] Kenya Garcia [phone] Rebekah [facility] [phone]       Last appointment: 12/8/2021  Next appointment: 6/8/2022  Last refill:

## 2022-02-17 ENCOUNTER — TELEPHONE (OUTPATIENT)
Dept: INTERNAL MEDICINE CLINIC | Age: 67
End: 2022-02-17

## 2022-02-17 DIAGNOSIS — I10 ESSENTIAL HYPERTENSION: ICD-10-CM

## 2022-02-17 RX ORDER — LOSARTAN POTASSIUM 50 MG/1
TABLET ORAL
Qty: 135 TABLET | Refills: 1 | Status: SHIPPED | OUTPATIENT
Start: 2022-02-17 | End: 2022-11-02

## 2022-02-17 NOTE — TELEPHONE ENCOUNTER
I cannot find where I increased her Losartan medication to 75 mg daily but patient is very reliable so I suspect this happened in a conversation that I neglected to document.

## 2022-02-17 NOTE — TELEPHONE ENCOUNTER
Due to Dr. Saúl Lindo increasing the amount of Losartan to 1 & 1/2 pills daily, patient will require a new prescription. Please call in Losartan 50 mg 1 & 1/2 pills daily to:    [id number]- [address] - P [phone] - F [phone]     The past refill request was for only 1 pill daily. Patient can be reached @ phone # provided should there be any questions.

## 2022-03-02 ENCOUNTER — TELEPHONE (OUTPATIENT)
Dept: INTERNAL MEDICINE CLINIC | Age: 67
End: 2022-03-02

## 2022-03-02 DIAGNOSIS — R93.7 ABNORMAL BONE DENSITY SCREENING: Primary | ICD-10-CM

## 2022-03-02 NOTE — TELEPHONE ENCOUNTER
Patient is going to have her Dexa Scan scheduled however they don't have any appointments until the old order will have already . Please place new order for this so that she can be scheduled.

## 2022-03-14 RX ORDER — ATENOLOL 25 MG/1
TABLET ORAL
Qty: 30 TABLET | Refills: 0 | Status: SHIPPED | OUTPATIENT
Start: 2022-03-14 | End: 2022-04-08

## 2022-04-08 RX ORDER — ATENOLOL 25 MG/1
TABLET ORAL
Qty: 30 TABLET | Refills: 0 | Status: SHIPPED | OUTPATIENT
Start: 2022-04-08 | End: 2022-05-05

## 2022-05-05 RX ORDER — ATENOLOL 25 MG/1
TABLET ORAL
Qty: 30 TABLET | Refills: 3 | Status: SHIPPED | OUTPATIENT
Start: 2022-05-05 | End: 2022-08-04

## 2022-05-10 ENCOUNTER — OFFICE VISIT (OUTPATIENT)
Dept: PULMONOLOGY | Age: 67
End: 2022-05-10
Payer: MEDICARE

## 2022-05-10 ENCOUNTER — TELEPHONE (OUTPATIENT)
Dept: PULMONOLOGY | Age: 67
End: 2022-05-10

## 2022-05-10 VITALS
OXYGEN SATURATION: 99 % | DIASTOLIC BLOOD PRESSURE: 100 MMHG | HEART RATE: 73 BPM | BODY MASS INDEX: 28.76 KG/M2 | HEIGHT: 69 IN | TEMPERATURE: 97.8 F | SYSTOLIC BLOOD PRESSURE: 150 MMHG | WEIGHT: 194.2 LBS

## 2022-05-10 DIAGNOSIS — F32.A DEPRESSION, UNSPECIFIED DEPRESSION TYPE: Chronic | ICD-10-CM

## 2022-05-10 DIAGNOSIS — I10 ESSENTIAL HYPERTENSION: Chronic | ICD-10-CM

## 2022-05-10 DIAGNOSIS — G47.33 OBSTRUCTIVE SLEEP APNEA (ADULT) (PEDIATRIC): Primary | ICD-10-CM

## 2022-05-10 PROCEDURE — 3017F COLORECTAL CA SCREEN DOC REV: CPT | Performed by: INTERNAL MEDICINE

## 2022-05-10 PROCEDURE — 99204 OFFICE O/P NEW MOD 45 MIN: CPT | Performed by: INTERNAL MEDICINE

## 2022-05-10 PROCEDURE — 1123F ACP DISCUSS/DSCN MKR DOCD: CPT | Performed by: INTERNAL MEDICINE

## 2022-05-10 PROCEDURE — G8417 CALC BMI ABV UP PARAM F/U: HCPCS | Performed by: INTERNAL MEDICINE

## 2022-05-10 PROCEDURE — 1090F PRES/ABSN URINE INCON ASSESS: CPT | Performed by: INTERNAL MEDICINE

## 2022-05-10 PROCEDURE — G8427 DOCREV CUR MEDS BY ELIG CLIN: HCPCS | Performed by: INTERNAL MEDICINE

## 2022-05-10 PROCEDURE — G8400 PT W/DXA NO RESULTS DOC: HCPCS | Performed by: INTERNAL MEDICINE

## 2022-05-10 PROCEDURE — 4040F PNEUMOC VAC/ADMIN/RCVD: CPT | Performed by: INTERNAL MEDICINE

## 2022-05-10 PROCEDURE — 1036F TOBACCO NON-USER: CPT | Performed by: INTERNAL MEDICINE

## 2022-05-10 ASSESSMENT — SLEEP AND FATIGUE QUESTIONNAIRES
HOW LIKELY ARE YOU TO NOD OFF OR FALL ASLEEP WHILE SITTING AND READING: 1
HOW LIKELY ARE YOU TO NOD OFF OR FALL ASLEEP WHILE LYING DOWN TO REST IN THE AFTERNOON WHEN CIRCUMSTANCES PERMIT: 2
ESS TOTAL SCORE: 6
HOW LIKELY ARE YOU TO NOD OFF OR FALL ASLEEP WHILE SITTING QUIETLY AFTER LUNCH WITHOUT ALCOHOL: 0
HOW LIKELY ARE YOU TO NOD OFF OR FALL ASLEEP WHILE SITTING INACTIVE IN A PUBLIC PLACE: 1
HOW LIKELY ARE YOU TO NOD OFF OR FALL ASLEEP WHILE WATCHING TV: 1
HOW LIKELY ARE YOU TO NOD OFF OR FALL ASLEEP IN A CAR, WHILE STOPPED FOR A FEW MINUTES IN TRAFFIC: 0
HOW LIKELY ARE YOU TO NOD OFF OR FALL ASLEEP WHILE SITTING AND TALKING TO SOMEONE: 0
HOW LIKELY ARE YOU TO NOD OFF OR FALL ASLEEP WHEN YOU ARE A PASSENGER IN A CAR FOR AN HOUR WITHOUT A BREAK: 1

## 2022-05-10 NOTE — PROGRESS NOTES
[facility]  [address] [facility], [address] [facility]- [phone]   [facility] [address] Telly Fam. [address] [place] [address]. Mckinley Nunez 37 [phone] [address] [facility]  [address] [place] [address] [address]  [address] [address]  Dept: [phone]  Loc: [phone]    Assessment:      Visit Diagnoses and Associated Orders     Obstructive sleep apnea (adult) (pediatric)   (New Problem)  -  Primary    Need old records, on Tx    Sleep Study with PAP Titration [37662 Custom]   - Future Order         Essential hypertension   (Stable)           Depression, unspecified depression type   (Stable)                  Plan: Will attempt to get old records  Reviewed compliance download with pt. Supplies and parts as needed for her machine. These are medically necessary. Continue medications per her PCP and other physicians. Limit caffeine use after 3pm.  Encouraged her to work on weight loss through diet and exercise. The primary encounter diagnosis was Obstructive sleep apnea (adult) (pediatric). Diagnoses of Essential hypertension and Depression, unspecified depression type were also pertinent to this visit. The chronic medical conditions listed are directly related to the primary diagnosis listed above. The management of the primary diagnosis affects the secondary diagnosis and vice versa. Since the patient's machine does not give AHI data and is not auto capable the patient will need a split-night study to remake the diagnosis and find out appropriate settings. The patient would benefit from a new auto titrating CPAP machine which we will order after her testing. Reviewed physiological data from the patient's machine which is downloaded and analyzed today.     Reviewed referral note for the patient's primary care doctor dated 6/9/2021 showing the patient with a previous diagnosis of sleep apnea in [place].    BMP from 2021 was normal.    This information was analyzed to assess complexity and medical decision making in regards to further testing and management. Continue meds for: HTN and depression. Pt would medically benefit from wt loss for DEJA (diet, exercise, surgical). Orders Placed This Encounter   Procedures    Sleep Study with PAP Titration          Subjective:     Patient ID: Jie Salazar is a 78 y.o. female. Chief Complaint   Patient presents with    Sleep Apnea       HPI:      Jie Salazar is a 78 y.o. female referred by Isaak Monsalve MD for a sleep evaluation. She complains of: Diagnosed with sleep apnea over 10 years ago, does not have copies of old records at this time. Was given a CPAP machine and then eventually got a replacement machine many years ago. She has since moved from [place] to [place] and needs supplies. She is not sleeping as well not feeling as rested as previously. Her machine is a very basic CPAP machine that we will not give AHI data nor is it auto capable. Machine Modem/Download Info:  Compliance (hours/night): 9.5 hrs/night  % of nights >= 4 hrs: 94.4 %   CPAP - Settings  Pressure: 7 cmH2O                   Comfort Settings  Humidity Level (0-8): 3       DOT/CDL - No  FAA/'s license -No    Previous Report(s) Reviewed: historical medical records, office notes, andreferral letter(s). Pertinent data has been documented.     [place] - Total score: 6    Social History     Socioeconomic History    Marital status: Single     Spouse name: Not on file    Number of children: 0    Years of education: Not on file    Highest education level: Not on file   Occupational History    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     Comment: horticulture   Tobacco Use    Smoking status: Former Smoker     Packs/day: 1.00     Years: 30.00     Pack years: 30.00     Quit date: 1986     Years since quittin.3    Smokeless tobacco: Never Used   Substance and Sexual Activity    Alcohol use: Yes    Drug use: Never    Sexual activity: Not Currently   Other Topics Concern    Not on file   Social History Narrative    Lives alone with dog (kaushal)     Social Determinants of Health     Financial Resource Strain: Low Risk     Difficulty of Paying Living Expenses: Not hard at all   Food Insecurity: No Food Insecurity    Worried About Running Out of Food in the Last Year: Never true    Jose Francisco of Food in the Last Year: Never true   Transportation Needs:     Lack of Transportation (Medical): Not on file    Lack of Transportation (Non-Medical):  Not on file   Physical Activity:     Days of Exercise per Week: Not on file    Minutes of Exercise per Session: Not on file   Stress:     Feeling of Stress : Not on file   Social Connections:     Frequency of Communication with Friends and Family: Not on file    Frequency of Social Gatherings with Friends and Family: Not on file    Attends Lutheran Services: Not on file    Active Member of [address] [place] [address] or Organizations: Not on file    Attends Club or Organization Meetings: Not on file    Marital Status: Not on file   Intimate Partner Violence:     Fear of Current or Ex-Partner: Not on file    Emotionally Abused: Not on file    Physically Abused: Not on file    Sexually Abused: Not on file   Housing Stability:     Unable to Pay for Housing in the Last Year: Not on file    Number of Places Lived in the Last Year: Not on file    Unstable Housing in the Last Year: Not on file        Current Outpatient Medications   Medication Instructions    atenolol (TENORMIN) 25 MG tablet TAKE 1 TABLET BY MOUTH EVERY DAY    calcium citrate (CALCITRATE) 250 mg, Oral, DAILY    Cholecalciferol 1,000 Units, Oral, DAILY    Cyanocobalamin (VITAMIN B12 PO) Oral    fish oil 1,000 mg, Oral, DAILY    hydroCHLOROthiazide (HYDRODIURIL) 25 MG tablet TAKE 1 TABLET BY MOUTH EVERY DAY    losartan (COZAAR) 50 MG tablet Take one and a half tablets by mouth every day    Multiple Vitamins-Minerals (MULTIVITAMIN ADULT PO) Oral    rosuvastatin (CRESTOR) 40 MG tablet TAKE 1 TABLET BY MOUTH EVERY DAY    venlafaxine (EFFEXOR XR) 150 MG extended release capsule TAKE 1 CAPSULE BY MOUTH EVERY DAY    vitamin C (ASCORBIC ACID) 500 mg, Oral, DAILY    VITAMIN D PO Oral          Objective:     Vitals:  Weight BMI   Wt Readings from Last 3 Encounters:   05/10/22 194 lb 3.2 oz (88.1 kg)   12/08/21 193 lb (87.5 kg)   11/19/21 196 lb 6.4 oz (89.1 kg)    Body mass index is 28.68 kg/m².      BP HR SaO2   BP Readings from Last 3 Encounters:   05/10/22 (!) 150/100   12/08/21 120/80   11/19/21 138/80    Pulse Readings from Last 3 Encounters:   05/10/22 73   12/08/21 76   11/19/21 97    SpO2 Readings from Last 3 Encounters:   05/10/22 99%   12/08/21 97%   11/19/21 98%        Electronically signed by Akhil Rivero MD on5/10/2022 at 3:44 PM

## 2022-05-10 NOTE — LETTER
[facility] Sleep Medicine  [address] [facility]  [address] [address]  Phone: [phone]  Fax: [phone]    Kasey Perez MD    May 10, 2022     Kirby Rodgers, [address] [facility] [address]    Patient: Holly Kim   MR Number: [id number]   YOB: 1955   Date of Visit: 5/10/2022       Dear Kirby Rodgers:    Thank you for referring Holly Kim to me for evaluation/treatment. Below are the relevant portions of my assessment and plan of care. Visit Diagnoses and Associated Orders     Obstructive sleep apnea (adult) (pediatric)   (New Problem)  -  Primary    Need old records, on Tx    Sleep Study with PAP Titration [63846 Custom]   - Future Order         Essential hypertension   (Stable)           Depression, unspecified depression type   (Stable)                 Will attempt to get old records  Reviewed compliance download with pt. Supplies and parts as needed for her machine. These are medically necessary. Continue medications per her PCP and other physicians. Limit caffeine use after 3pm.  Encouraged her to work on weight loss through diet and exercise. The primary encounter diagnosis was Obstructive sleep apnea (adult) (pediatric). Diagnoses of Essential hypertension and Depression, unspecified depression type were also pertinent to this visit. The chronic medical conditions listed are directly related to the primary diagnosis listed above. The management of the primary diagnosis affects the secondary diagnosis and vice versa. Since the patient's machine does not give AHI data and is not auto capable the patient will need a split-night study to remake the diagnosis and find out appropriate settings. The patient would benefit from a new auto titrating CPAP machine which we will order after her testing. Reviewed physiological data from the patient's machine which is downloaded and analyzed today.     Reviewed referral note for the patient's primary care doctor dated 6/9/2021 showing the patient with a previous diagnosis of sleep apnea in [place]. BMP from 11/18/2021 was normal.    This information was analyzed to assess complexity and medical decision making in regards to further testing and management. Continue meds for: HTN and depression. Pt would medically benefit from wt loss for DEJA (diet, exercise, surgical). Orders Placed This Encounter   Procedures    Sleep Study with PAP Titration       If you have questions, please do not hesitate to call me. I look forward to following Lorrie Jameson along with you.     Sincerely,      Arnoldo Ferro MD

## 2022-05-10 NOTE — TELEPHONE ENCOUNTER
Called to [facility] [place] [facility] to see if they have Sleep Studies for this patient.  [phone]

## 2022-05-11 ENCOUNTER — TELEPHONE (OUTPATIENT)
Dept: SLEEP CENTER | Age: 67
End: 2022-05-11

## 2022-05-11 NOTE — TELEPHONE ENCOUNTER
Called to schedule a split night per Mario Hwang  for the pt to return my call     Medicare insurance

## 2022-05-16 ENCOUNTER — TELEPHONE (OUTPATIENT)
Dept: PULMONOLOGY | Age: 67
End: 2022-05-16

## 2022-05-16 NOTE — TELEPHONE ENCOUNTER
Patient saw Dr. Crispin Chacon last week and an order for supplies needs to be sent to [address] [place] [address]. Any questions, please call patient at [phone].

## 2022-05-16 NOTE — TELEPHONE ENCOUNTER
Waiting for patient to have her split-night before we send an order. She has Medicare so the testing needs to be done using a 4% scoring rule and her all testing does not qualify.   It appears they tried to call out to get her scheduled so she should call the sleep lab to get scheduled for her test.  As soon as we can do the test we can send a prescription for supplies and a new machine to medical services

## 2022-05-31 RX ORDER — HYDROCHLOROTHIAZIDE 25 MG/1
TABLET ORAL
Qty: 90 TABLET | Refills: 1 | Status: SHIPPED | OUTPATIENT
Start: 2022-05-31 | End: 2022-11-02 | Stop reason: SINTOL

## 2022-06-13 RX ORDER — VENLAFAXINE HYDROCHLORIDE 150 MG/1
CAPSULE, EXTENDED RELEASE ORAL
Qty: 90 CAPSULE | Refills: 1 | Status: SHIPPED | OUTPATIENT
Start: 2022-06-13

## 2022-07-06 ENCOUNTER — OFFICE VISIT (OUTPATIENT)
Dept: INTERNAL MEDICINE CLINIC | Age: 67
End: 2022-07-06
Payer: MEDICARE

## 2022-07-06 VITALS
OXYGEN SATURATION: 95 % | SYSTOLIC BLOOD PRESSURE: 132 MMHG | BODY MASS INDEX: 29.21 KG/M2 | DIASTOLIC BLOOD PRESSURE: 78 MMHG | HEART RATE: 86 BPM | WEIGHT: 197.8 LBS

## 2022-07-06 DIAGNOSIS — F32.A DEPRESSION, UNSPECIFIED DEPRESSION TYPE: ICD-10-CM

## 2022-07-06 DIAGNOSIS — R73.9 HYPERGLYCEMIA: ICD-10-CM

## 2022-07-06 DIAGNOSIS — I10 ESSENTIAL HYPERTENSION: Primary | ICD-10-CM

## 2022-07-06 DIAGNOSIS — E78.5 HYPERLIPIDEMIA, UNSPECIFIED HYPERLIPIDEMIA TYPE: ICD-10-CM

## 2022-07-06 PROCEDURE — 1036F TOBACCO NON-USER: CPT | Performed by: INTERNAL MEDICINE

## 2022-07-06 PROCEDURE — G8417 CALC BMI ABV UP PARAM F/U: HCPCS | Performed by: INTERNAL MEDICINE

## 2022-07-06 PROCEDURE — 1090F PRES/ABSN URINE INCON ASSESS: CPT | Performed by: INTERNAL MEDICINE

## 2022-07-06 PROCEDURE — 99214 OFFICE O/P EST MOD 30 MIN: CPT | Performed by: INTERNAL MEDICINE

## 2022-07-06 PROCEDURE — G8400 PT W/DXA NO RESULTS DOC: HCPCS | Performed by: INTERNAL MEDICINE

## 2022-07-06 PROCEDURE — 3017F COLORECTAL CA SCREEN DOC REV: CPT | Performed by: INTERNAL MEDICINE

## 2022-07-06 PROCEDURE — G8427 DOCREV CUR MEDS BY ELIG CLIN: HCPCS | Performed by: INTERNAL MEDICINE

## 2022-07-06 PROCEDURE — 1123F ACP DISCUSS/DSCN MKR DOCD: CPT | Performed by: INTERNAL MEDICINE

## 2022-07-06 SDOH — ECONOMIC STABILITY: FOOD INSECURITY: WITHIN THE PAST 12 MONTHS, YOU WORRIED THAT YOUR FOOD WOULD RUN OUT BEFORE YOU GOT MONEY TO BUY MORE.: NEVER TRUE

## 2022-07-06 SDOH — ECONOMIC STABILITY: FOOD INSECURITY: WITHIN THE PAST 12 MONTHS, THE FOOD YOU BOUGHT JUST DIDN'T LAST AND YOU DIDN'T HAVE MONEY TO GET MORE.: NEVER TRUE

## 2022-07-06 ASSESSMENT — SOCIAL DETERMINANTS OF HEALTH (SDOH): HOW HARD IS IT FOR YOU TO PAY FOR THE VERY BASICS LIKE FOOD, HOUSING, MEDICAL CARE, AND HEATING?: NOT HARD AT ALL

## 2022-07-06 NOTE — PROGRESS NOTES
Omayra Royal (:  1955) is a 78 y.o. female, here for evaluation of the following chief complaint(s):    Follow-up      ASSESSMENT/PLAN:  1. Essential hypertension  Well-controlled on hydrochlorothiazide, atenolol, losartan  2. Hyperlipidemia, unspecified hyperlipidemia type  Check labs on statin  -     Lipid Panel; Future  -     Comprehensive Metabolic Panel; Future  3. Hyperglycemia  -     Hemoglobin A1C; Future  Depression, unspecified depression type  Doing well on Effexor  Phq9-2    Right ankle swelling-advised compression stocking. Told patient to let me know if not improving and may obtain Doppler studies. Return in about 6 months (around 2023) for awv next. SUBJECTIVE/OBJECTIVE:  HPI   Patient is here for routine visit. She feels well, in her usual state of health. She has noted some increased mild right ankle swelling for the past few days. She has tried to elevate her leg but has not noticed an improvement. She knows to keep a close eye on this as she has a history of right calf thrombophlebitis. She states this does not feel like the prior episodes. She saw a hand orthopedist-Dr. Dangelo Newman, about her right thumb arthritis. She has seen her sleep doctor about getting new CPAP equipment.           Past Medical History:   Diagnosis Date    Abnormal EKG     saw cardio, wnl stress test    Allergic rhinitis     spring trees    Depression     Endometriosis     Essential hypertension     H/O mammogram 2018    [place]    Hyperlipidemia     Pap smear for cervical cancer screening 2016    Plantar fasciitis     Recurrent sinus infections     Sleep apnea     cpap    Visit for review of DEXA scan     10/16-wnl T score       Current Outpatient Medications   Medication Sig Dispense Refill    venlafaxine (EFFEXOR XR) 150 MG extended release capsule TAKE 1 CAPSULE BY MOUTH EVERY DAY 90 capsule 1    hydroCHLOROthiazide (HYDRODIURIL) 25 MG tablet TAKE 1 TABLET BY MOUTH EVERY DAY 90 tablet 1    atenolol (TENORMIN) 25 MG tablet TAKE 1 TABLET BY MOUTH EVERY DAY 30 tablet 3    losartan (COZAAR) 50 MG tablet Take one and a half tablets by mouth every day 135 tablet 1    rosuvastatin (CRESTOR) 40 MG tablet TAKE 1 TABLET BY MOUTH EVERY DAY 90 tablet 1    Cholecalciferol 50 MCG (2000 UT) TABS Take 1,000 Units by mouth daily       calcium citrate (CALCITRATE) 250 MG TABS tablet Take 1 tablet by mouth daily 60 tablet 0    Omega-3 Fatty Acids (FISH OIL) 1000 MG CAPS Take 1 capsule by mouth daily      Cyanocobalamin (VITAMIN B12 PO) Take by mouth      VITAMIN D PO Take by mouth      Multiple Vitamins-Minerals (MULTIVITAMIN ADULT PO) Take by mouth      vitamin C (ASCORBIC ACID) 500 MG tablet Take 500 mg by mouth daily       No current facility-administered medications for this visit. Physical Exam  Vitals reviewed. Constitutional:       General: She is not in acute distress. HENT:      Head: Normocephalic and atraumatic. Cardiovascular:      Rate and Rhythm: Normal rate and regular rhythm. Pulmonary:      Effort: Pulmonary effort is normal.      Breath sounds: Normal breath sounds. Musculoskeletal:      Right lower leg: Edema (tr ankle - chronic right calf swelling) present. Left lower leg: No edema. Neurological:      General: No focal deficit present. Mental Status: She is alert and oriented to person, place, and time. Psychiatric:         Mood and Affect: Mood normal.               This note was generated completely or in part utilizing Dragon dictation speech recognition software. Occasionally, words are mistranscribed and despite editing, the text may contain inaccuracies due to incorrect word recognition. If further clarification is needed please contact the office at [phone]          An electronic signature was used to authenticate this note.     --Damien Chin MD

## 2022-08-03 DIAGNOSIS — E78.5 HYPERLIPIDEMIA, UNSPECIFIED HYPERLIPIDEMIA TYPE: ICD-10-CM

## 2022-08-03 RX ORDER — ROSUVASTATIN CALCIUM 40 MG/1
TABLET, COATED ORAL
Qty: 90 TABLET | Refills: 1 | Status: SHIPPED | OUTPATIENT
Start: 2022-08-03

## 2022-08-04 RX ORDER — ATENOLOL 25 MG/1
TABLET ORAL
Qty: 90 TABLET | Refills: 1 | Status: SHIPPED | OUTPATIENT
Start: 2022-08-04

## 2022-08-09 ENCOUNTER — HOSPITAL ENCOUNTER (OUTPATIENT)
Dept: GENERAL RADIOLOGY | Age: 67
Discharge: HOME | End: 2022-08-09
Payer: MEDICARE

## 2022-08-09 ENCOUNTER — HOSPITAL ENCOUNTER (OUTPATIENT)
Dept: MAMMOGRAPHY | Age: 67
Discharge: HOME | End: 2022-08-09
Payer: MEDICARE

## 2022-08-09 VITALS — HEIGHT: 69 IN | BODY MASS INDEX: 29.18 KG/M2 | WEIGHT: 197 LBS

## 2022-08-09 DIAGNOSIS — Z12.31 VISIT FOR SCREENING MAMMOGRAM: ICD-10-CM

## 2022-08-09 DIAGNOSIS — R93.7 ABNORMAL BONE DENSITY SCREENING: ICD-10-CM

## 2022-08-09 PROCEDURE — 77063 BREAST TOMOSYNTHESIS BI: CPT

## 2022-08-09 PROCEDURE — 77067 SCR MAMMO BI INCL CAD: CPT

## 2022-08-09 PROCEDURE — 77080 DXA BONE DENSITY AXIAL: CPT

## 2022-08-12 DIAGNOSIS — K62.9 DISORDER OF ANUS: Primary | ICD-10-CM

## 2022-08-21 ENCOUNTER — HOSPITAL ENCOUNTER (OUTPATIENT)
Dept: SLEEP CENTER | Age: 67
Discharge: HOME | End: 2022-08-21
Payer: MEDICARE

## 2022-08-21 DIAGNOSIS — G47.33 OBSTRUCTIVE SLEEP APNEA (ADULT) (PEDIATRIC): ICD-10-CM

## 2022-08-21 PROCEDURE — 95811 POLYSOM 6/>YRS CPAP 4/> PARM: CPT

## 2022-08-23 PROCEDURE — 95811 POLYSOM 6/>YRS CPAP 4/> PARM: CPT | Performed by: INTERNAL MEDICINE

## 2022-08-31 ENCOUNTER — OFFICE VISIT (OUTPATIENT)
Dept: SURGERY | Age: 67
End: 2022-08-31
Payer: MEDICARE

## 2022-08-31 VITALS
HEIGHT: 69 IN | TEMPERATURE: 97.2 F | HEART RATE: 80 BPM | DIASTOLIC BLOOD PRESSURE: 96 MMHG | BODY MASS INDEX: 29.18 KG/M2 | OXYGEN SATURATION: 95 % | WEIGHT: 197 LBS | SYSTOLIC BLOOD PRESSURE: 147 MMHG

## 2022-08-31 DIAGNOSIS — K64.4 SKIN TAG OF ANUS: Primary | ICD-10-CM

## 2022-08-31 PROCEDURE — 99202 OFFICE O/P NEW SF 15 MIN: CPT | Performed by: SURGERY

## 2022-08-31 PROCEDURE — G8399 PT W/DXA RESULTS DOCUMENT: HCPCS | Performed by: SURGERY

## 2022-08-31 PROCEDURE — 1036F TOBACCO NON-USER: CPT | Performed by: SURGERY

## 2022-08-31 PROCEDURE — 1090F PRES/ABSN URINE INCON ASSESS: CPT | Performed by: SURGERY

## 2022-08-31 PROCEDURE — 1123F ACP DISCUSS/DSCN MKR DOCD: CPT | Performed by: SURGERY

## 2022-08-31 PROCEDURE — G8417 CALC BMI ABV UP PARAM F/U: HCPCS | Performed by: SURGERY

## 2022-08-31 PROCEDURE — 3017F COLORECTAL CA SCREEN DOC REV: CPT | Performed by: SURGERY

## 2022-08-31 PROCEDURE — G8427 DOCREV CUR MEDS BY ELIG CLIN: HCPCS | Performed by: SURGERY

## 2022-08-31 NOTE — PROGRESS NOTES
[address] [facility] COLORECTAL SURGERY  [address]  Dept: [phone]  Dept Fax: [phone]  Loc: [phone]    Visit Date: 8/31/2022    Kole Hough is a 75 y.o. female who presents today for: New Patient (Skin tag )      HPI:       Kole Hough is a 75 y.o. female referred to me for further evaluation regarding anal skin tag. Clint Goodpasture tells me that she has a skin tag around the anus has been there for quite some time and she just wanted to make sure that there is no issues with it. She denies pain, bleeding, irritation. She states she is up-to-date on colonoscopy. Patient's problem list, medications, past medical, surgical, family, and social histories were reviewed and updated in the chart as indicated today. Past Medical History:   Diagnosis Date    Abnormal EKG     saw cardio, wnl stress test    Allergic rhinitis     spring trees    Depression     Endometriosis     Essential hypertension     H/[place] mammogram 01/25/2018    [place]    History of thrombophlebitis     right leg    Hyperlipidemia     Pap smear for cervical cancer screening 08/25/2016    Plantar fasciitis     Recurrent sinus infections     Sleep apnea     cpap    Visit for review of DEXA scan     10/16-wnl T score       Past Surgical History:   Procedure Laterality Date    ANUS SURGERY      anal fissure    COLONOSCOPY  09/2014    recheck 9/19    COLONOSCOPY  04/2019    fu 10 yrs    CYST REMOVAL      right foot    HYSTERECTOMY (CERVIX STATUS UNKNOWN)  2005    JOINT REPLACEMENT Right 2011    PARTIAL HYSTERECTOMY (CERVIX NOT REMOVED)  2006    still w cervix and ovaries    SEPTOPLASTY      SUBTOTAL COLECTOMY  09/2007    perforated colon     TONSILLECTOMY         Cancer-related family history includes Breast Cancer in her maternal cousin; Renaee Rinne in her mother; Prostate Cancer in her brother and father.     Social History:   Social History Tobacco Use    Smoking status: Former     Packs/day: 1.00     Years: 30.00     Pack years: 30.00     Types: Cigarettes     Quit date: 1986     Years since quittin.6    Smokeless tobacco: Never   Substance Use Topics    Alcohol use: Yes      Tobacco cessation counseling provided as appropriate. REVIEW OF SYSTEMS:    Pertinent positives and negatives are mentioned in the HPI above. Otherwise, all other systems were reviewed and negative. Objective:     Physical Exam   BP (!) 147/96   Pulse 80   Temp 97.2 °F (36.2 °C) (Infrared)   Ht 5' 9\" (1.753 m)   Wt 197 lb (89.4 kg)   SpO2 95%   BMI 29.09 kg/m²   Constitutional: Appears well-developed and well-nourished. Grooming appropriate. No gross deformities. Body mass index is 29.09 kg/m². Eyes: No scleral icterus. Conjunctiva/lids normal. Vision intact grossly. Pupils equal/symmetric, reactive bilaterally. ENT: External ears/nose without defect, scars, or masses. Hearing grossly intact. No facial deformity. Lips normal, normal dentition. Neck: No masses. Trachea midline. No crepitus. Thyroid not enlarged. Cardiovascular: Normal rate. No peripheral edema. Abdominal aorta normal size to palpation. Pulmonary/Chest: Effort normal. No respiratory distress. No wheezes. No use of accessory muscles. Musculoskeletal: Normal range of motion x all 4 extremities and head/neck, without deformity, pain, or crepitus, with normal strength and tone. Normal gait. Nails without clubbing or cyanosis. Neurological: Alert and oriented to person, place, and time. No gross deficits. Sensation intact. Skin: Skin is dry. No rashes noted. No pallor. No induration of nodules. Psychiatric: Normal mood and affect. Behavior normal. Oriented to person, place, and time. Judgment and insight reasonable. Abdominal/wound: Soft, nontender, nondistended    Anorectal Exam: Chaperone present in room throughout exam.  Patient placed in the left lateral position.   Buttocks spread. Digital rectal exam performed with lubricated finger. Anoscopy performed. Findings reveal: Right lateral fibroepithelial skin tag without thrombosis or adenomatous appearing tissue      Labs reviewed: None  Radiology reviewed: None    Last colonoscopy: Up-to-date per patient      Assessment/Plan:     A/P:  New problem(s) with uncertain prognosis: Anal skin tag  Established problem(s): None  Additional workup/treatment planned: Expectant management  Risk of complications/morbidity: Low    I gave Manisha Ely reassurance that her anal skin tag is completely benign and there is nothing that needs to be done for at. She is completely asymptomatic so we can continue with expectant management. Continue with current medications      DISPOSITION:  f/u PRN    My findings will be relayed to consulting practitioner or PCP via Epic      Note completed using dictation software, please excuse any errors.     Electronically signed by Bud Willingham MD on 8/31/2022 at 2:12 PM

## 2022-09-01 ENCOUNTER — TELEPHONE (OUTPATIENT)
Dept: PULMONOLOGY | Age: 67
End: 2022-09-01

## 2022-09-09 NOTE — TELEPHONE ENCOUNTER
Patient left a voicemail wanting to schedule f/u appointment to sleep study, but I think she still needs her results first.  Please call patient at [phone].

## 2022-09-12 ENCOUNTER — TELEPHONE (OUTPATIENT)
Dept: PULMONOLOGY | Age: 67
End: 2022-09-12

## 2022-09-12 NOTE — PROGRESS NOTES
Michelle Martell         : 1955  [facility]    Diagnosis: [x] DEJA (G47.33) [] CSA (G47.31) [] Apnea (G47.30)   Length of Need: [x] 18 Months [] 99 Months [] Other:    Machine (JONA!): [] Respironics Dream Station   2   Auto [x] ResMed AirSense     Auto S11 [] Other:     [x]  CPAP () [] Bilevel ()   Mode: [x] Auto [] Spontaneous    Mode: [] Auto [] Spontaneous      P min 9 cmH2O  P max 19 cmH2O      Comfort Settings:   - Ramp Pressure: 5 cmH2O                                        - Ramp time: 15 min                                     -  Flex/EPR - 3 full time                                    - For ResMed Bilevel (TiMax-4 sec   TiMin- 0.2 sec)     Humidifier: [x] Heated ()        [x] Water chamber replacement ()/ 1 per 6 months        Mask:   [x] Nasal () /1 per 3 months [] Full Face () /1 per 3 months   [x] Patient choice -Size and fit mask [] Patient Choice - Size and fit mask   [] Dispense:  [] Dispense:    [] Headgear () / 1 per 3 months [] Headgear () / 1 per 3 months   [] Replacement Nasal Cushion ()/2 per month [] Interface Replacement ()/1 per month   [x] Replacement Nasal Pillows ()/2 per month         Tubing: [x] Heated ()/1 per 3 months    [] Standard ()/1 per 3 months [] Other:           Filters: [x] Non-disposable ()/1 per 6 months     [x] Ultra-Fine, Disposable ()/2 per month        Miscellaneous: [] Chin Strap ()/ 1 per 6 months [] O2 bleed-in:       LPM   [] Oximetry on CPAP/Bilevel []  Other:    [x] Modem: ()         Start Order Date: 22    MEDICAL JUSTIFICATION:  I, the undersigned, certify that the above prescribed supplies are medically necessary for this patients wellbeing. In my opinion, the supplies are both reasonable and necessary in reference to accepted standards of medicalpractice in treatment of this patients condition.     Flip Hinkle MD      NPI: [id number]       Order Signed Date: 22    Electronically signed by Rian Donis MD on 2022 at [address]  1955  [facility] [address]  [phone] (home)   [phone] (mobile)      Insurance Info (confirm with patient if correct):  Payer/Plan Subscr  Sex Relation Sub.  Ins. ID Effective Group Num

## 2022-09-12 NOTE — TELEPHONE ENCOUNTER
Spoke with pt to review study results. Order to be sent to [facility] per pt. F/U to be scheduled.

## 2022-09-23 ENCOUNTER — TELEPHONE (OUTPATIENT)
Dept: PULMONOLOGY | Age: 67
End: 2022-09-23

## 2022-09-23 NOTE — TELEPHONE ENCOUNTER
Pt does not feel she is getting enough air and will bring her unit to be downloaded. Pt in with unit . Unit is CPAP only. Pt is having difficulty breathing with unit. . Unit is very loud and can not be set to needed pressures. Dr. Fabien Omalley notified.

## 2022-09-23 NOTE — PROGRESS NOTES
Michelle Martell         : 1955 [facility]    Diagnosis: [x] DEJA (G47.33) [] CSA (G47.31) [] Apnea (G47.30)   Length of Need: [x] 18 Months [] 99 Months [] Other:    Machine (JONA!): [] Respironics Dream Station   2   Auto [x] ResMed AirSense     Auto S11 [] Other:     [x]  CPAP () [] Bilevel ()   Mode: [x] Auto [] Spontaneous    Mode: [] Auto [] Spontaneous      P min 9 cmH2O  Pmax 19 cmH2O        Comfort Settings:   - Ramp Pressure: 4 cmH2O                                        - Ramp time: 15 min                                     -  Flex/EPR - 3 full time                                    - For ResMed Bilevel (TiMax-4 sec   TiMin- 0.2 sec)     Humidifier: [x] Heated ()        [x] Water chamber replacement ()/ 1 per 6 months        Mask:   [] Nasal () /1 per 3 months [x] Full Face () /1 per 3 months   [] Patient choice -Size and fit mask [x] Patient Choice - Size and fit mask   [] Dispense:  [] Dispense:    [] Headgear () / 1 per 3 months [x] Headgear () / 1 per 3 months   [] Replacement Nasal Cushion ()/2 per month [x] Interface Replacement ()/1 per month   [] Replacement Nasal Pillows ()/2 per month         Tubing: [x] Heated ()/1 per 3 months    [] Standard ()/1 per 3 months [] Other:           Filters: [x] Non-disposable ()/1 per 6 months     [x] Ultra-Fine, Disposable ()/2 per month        Miscellaneous: [] Chin Strap ()/ 1 per 6 months [] O2 bleed-in:       LPM   [] Oximetry on CPAP/Bilevel []  Other:    [x] Modem: ()         Start Order Date: 22    MEDICAL JUSTIFICATION:  I, the undersigned, certify that the above prescribed supplies are medically necessary for this patients wellbeing. In my opinion, the supplies are both reasonable and necessary in reference to accepted standards of medicalpractice in treatment of this patients condition.     Flip Hinkle MD      NPI: [id number]       Order Signed Date: 22    Electronically signed by Rian Donis MD on 2022 at 12:06 PM    Demario Hunter  1955  [address]  [phone] (home)   [phone] (mobile)      Insurance Info (confirm with patient if correct):  Payer/Plan Subscr  Sex Relation Sub.  Ins. ID Effective Group Num

## 2022-10-04 ENCOUNTER — TELEPHONE (OUTPATIENT)
Dept: PULMONOLOGY | Age: 67
End: 2022-10-04

## 2022-10-04 NOTE — TELEPHONE ENCOUNTER
Opal from [facility] called and stated they cannot fill order for new CPAP machine because she obtained a CPAP in February of 2022 from [facility], and due to their policy they cannot provide a new machine within the same year.

## 2022-10-05 NOTE — TELEPHONE ENCOUNTER
Spoke with patient and she said she will receive a replace machine on 10/30. And she will call us back if she needs something else and also for futures orders will be [employer].

## 2022-10-05 NOTE — TELEPHONE ENCOUNTER
Patient called back, I informed her of [facility]'s message from yesterday. Patient stated that was not accurate, she received CPAP from [facility] in January of 2019. I called [facility] ([phone]), spoke to Jorge Hanson and she confirmed they provided patient machine 1/15/2019, and last sent her supplies in Feb of 2021. I called [facility] and spoke with MAR HICKEY [facility], she said they cannot provide a new machine as insurance will not cover because it is not 11years old. She said they can provide supplies for her, but cannot fill order for new machine. Call [facility] for this patient, not main line. [phone]. Please advise.

## 2022-10-21 DIAGNOSIS — M25.511 RIGHT SHOULDER PAIN, UNSPECIFIED CHRONICITY: Primary | ICD-10-CM

## 2022-11-02 ENCOUNTER — HOSPITAL ENCOUNTER (OUTPATIENT)
Dept: GENERAL RADIOLOGY | Age: 67
Discharge: HOME | End: 2022-11-02
Payer: MEDICARE

## 2022-11-02 ENCOUNTER — OFFICE VISIT (OUTPATIENT)
Dept: INTERNAL MEDICINE CLINIC | Age: 67
End: 2022-11-02
Payer: MEDICARE

## 2022-11-02 VITALS
DIASTOLIC BLOOD PRESSURE: 74 MMHG | SYSTOLIC BLOOD PRESSURE: 118 MMHG | OXYGEN SATURATION: 98 % | WEIGHT: 196 LBS | BODY MASS INDEX: 28.94 KG/M2 | HEART RATE: 77 BPM

## 2022-11-02 DIAGNOSIS — M25.511 CHRONIC RIGHT SHOULDER PAIN: Primary | ICD-10-CM

## 2022-11-02 DIAGNOSIS — Z23 FLU VACCINE NEED: ICD-10-CM

## 2022-11-02 DIAGNOSIS — I10 ESSENTIAL HYPERTENSION: ICD-10-CM

## 2022-11-02 DIAGNOSIS — M25.511 CHRONIC RIGHT SHOULDER PAIN: ICD-10-CM

## 2022-11-02 DIAGNOSIS — G89.29 CHRONIC RIGHT SHOULDER PAIN: ICD-10-CM

## 2022-11-02 DIAGNOSIS — G89.29 CHRONIC RIGHT SHOULDER PAIN: Primary | ICD-10-CM

## 2022-11-02 PROCEDURE — 73030 X-RAY EXAM OF SHOULDER: CPT

## 2022-11-02 PROCEDURE — 3078F DIAST BP <80 MM HG: CPT | Performed by: INTERNAL MEDICINE

## 2022-11-02 PROCEDURE — G0008 ADMIN INFLUENZA VIRUS VAC: HCPCS | Performed by: INTERNAL MEDICINE

## 2022-11-02 PROCEDURE — 1123F ACP DISCUSS/DSCN MKR DOCD: CPT | Performed by: INTERNAL MEDICINE

## 2022-11-02 PROCEDURE — 3017F COLORECTAL CA SCREEN DOC REV: CPT | Performed by: INTERNAL MEDICINE

## 2022-11-02 PROCEDURE — 1090F PRES/ABSN URINE INCON ASSESS: CPT | Performed by: INTERNAL MEDICINE

## 2022-11-02 PROCEDURE — G8399 PT W/DXA RESULTS DOCUMENT: HCPCS | Performed by: INTERNAL MEDICINE

## 2022-11-02 PROCEDURE — G8427 DOCREV CUR MEDS BY ELIG CLIN: HCPCS | Performed by: INTERNAL MEDICINE

## 2022-11-02 PROCEDURE — 1036F TOBACCO NON-USER: CPT | Performed by: INTERNAL MEDICINE

## 2022-11-02 PROCEDURE — 90694 VACC AIIV4 NO PRSRV 0.5ML IM: CPT | Performed by: INTERNAL MEDICINE

## 2022-11-02 PROCEDURE — 99214 OFFICE O/P EST MOD 30 MIN: CPT | Performed by: INTERNAL MEDICINE

## 2022-11-02 PROCEDURE — G8417 CALC BMI ABV UP PARAM F/U: HCPCS | Performed by: INTERNAL MEDICINE

## 2022-11-02 PROCEDURE — G8484 FLU IMMUNIZE NO ADMIN: HCPCS | Performed by: INTERNAL MEDICINE

## 2022-11-02 PROCEDURE — 3074F SYST BP LT 130 MM HG: CPT | Performed by: INTERNAL MEDICINE

## 2022-11-02 RX ORDER — LOSARTAN POTASSIUM 100 MG/1
TABLET ORAL
Qty: 90 TABLET | Refills: 1 | Status: SHIPPED | OUTPATIENT
Start: 2022-11-02

## 2022-11-02 ASSESSMENT — PATIENT HEALTH QUESTIONNAIRE - PHQ9
7. TROUBLE CONCENTRATING ON THINGS, SUCH AS READING THE NEWSPAPER OR WATCHING TELEVISION: 0
SUM OF ALL RESPONSES TO PHQ QUESTIONS 1-9: 0
4. FEELING TIRED OR HAVING LITTLE ENERGY: 0
SUM OF ALL RESPONSES TO PHQ QUESTIONS 1-9: 0
2. FEELING DOWN, DEPRESSED OR HOPELESS: 0
SUM OF ALL RESPONSES TO PHQ QUESTIONS 1-9: 0
SUM OF ALL RESPONSES TO PHQ9 QUESTIONS 1 & 2: 0
9. THOUGHTS THAT YOU WOULD BE BETTER OFF DEAD, OR OF HURTING YOURSELF: 0
1. LITTLE INTEREST OR PLEASURE IN DOING THINGS: 0
6. FEELING BAD ABOUT YOURSELF - OR THAT YOU ARE A FAILURE OR HAVE LET YOURSELF OR YOUR FAMILY DOWN: 0
3. TROUBLE FALLING OR STAYING ASLEEP: 0
SUM OF ALL RESPONSES TO PHQ QUESTIONS 1-9: 0
10. IF YOU CHECKED OFF ANY PROBLEMS, HOW DIFFICULT HAVE THESE PROBLEMS MADE IT FOR YOU TO DO YOUR WORK, TAKE CARE OF THINGS AT HOME, OR GET ALONG WITH OTHER PEOPLE: 0
5. POOR APPETITE OR OVEREATING: 0
8. MOVING OR SPEAKING SO SLOWLY THAT OTHER PEOPLE COULD HAVE NOTICED. OR THE OPPOSITE, BEING SO FIGETY OR RESTLESS THAT YOU HAVE BEEN MOVING AROUND A LOT MORE THAN USUAL: 0

## 2022-11-02 NOTE — PATIENT INSTRUCTIONS
Shoulder xray  [address]    Physical therapy  Jermaine pollock    Stop hctz (due to high bun/creat ratio and also it raises triglycerides)  Increase to losartan 100 mg daily instead

## 2022-11-02 NOTE — PROGRESS NOTES
oCrbin Salinas (:  1955) is a 75 y.o. female, here for evaluation of the following chief complaint(s):    Shoulder Pain (Right shoulder pain for approx 2 weeks. Limited ROM. Denies injury)      ASSESSMENT/PLAN:  1. Chronic right shoulder pain  Differential diagnosis includes arthritis and rotator cuff tendinitis. Patient has not responded to conservative therapy so will obtain x-ray. Depending on results, may also refer to physical therapy or orthopedics. -     XR SHOULDER RIGHT (MIN 2 VIEWS); Future  2. Essential hypertension  Stop hctz (due to high bun/creat ratio and also it raises triglycerides)  Increase to losartan 100 mg daily instead    3. Flu vaccine need  -     Influenza, FLUAD, (age 72 y+), IM, Preservative Free, 0.5 mL  Keep 22 visit    SUBJECTIVE/OBJECTIVE:  HPI  Patient complains of 3 weeks of right shoulder painful range of motion. She has tried ice, heat, and Aleve. It gets worse as the day goes on. She is right-handed. It especially hurts to lift it when she raises her arm in front of herself or tries to get something from the cabinet. We also discussed her recent blood work showing high BUN. It is noted she is on a diuretic. She also had concerns about her elevated triglycerides.     Review of Systems    Past Medical History:   Diagnosis Date    Abnormal EKG     saw cardio, wnl stress test    Allergic rhinitis     spring trees    Depression     Endometriosis     Essential hypertension     H/O mammogram 2018    [place]    History of thrombophlebitis     right leg    Hyperlipidemia     Pap smear for cervical cancer screening 2016    Plantar fasciitis     Recurrent sinus infections     Sleep apnea     cpap    Visit for review of DEXA scan     10/16-wnl T score       Current Outpatient Medications   Medication Sig Dispense Refill    losartan (COZAAR) 100 MG tablet Take one and a half tablets by mouth every day 90 tablet 1    atenolol (TENORMIN) 25 MG tablet TAKE 1 TABLET BY MOUTH EVERY DAY 90 tablet 1    rosuvastatin (CRESTOR) 40 MG tablet TAKE 1 TABLET BY MOUTH EVERY DAY 90 tablet 1    venlafaxine (EFFEXOR XR) 150 MG extended release capsule TAKE 1 CAPSULE BY MOUTH EVERY DAY 90 capsule 1    Cholecalciferol 50 MCG (2000 UT) TABS Take 1,000 Units by mouth daily       calcium citrate (CALCITRATE) 250 MG TABS tablet Take 1 tablet by mouth daily 60 tablet 0    Omega-3 Fatty Acids (FISH OIL) 1000 MG CAPS Take 1 capsule by mouth daily      Cyanocobalamin (VITAMIN B12 PO) Take by mouth      VITAMIN D PO Take by mouth      Multiple Vitamins-Minerals (MULTIVITAMIN ADULT PO) Take by mouth      vitamin C (ASCORBIC ACID) 500 MG tablet Take 500 mg by mouth daily       No current facility-administered medications for this visit. Physical Exam  Vitals reviewed. Constitutional:       General: She is not in acute distress. Musculoskeletal:      Comments: Right shoulder painful range of motion, pos impingement   Neurological:      Mental Status: She is alert and oriented to person, place, and time. Sensory: No sensory deficit. Motor: No weakness. Psychiatric:         Mood and Affect: Mood normal.             This note was generated completely or in part utilizing Dragon dictation speech recognition software. Occasionally, words are mistranscribed and despite editing, the text may contain inaccuracies due to incorrect word recognition. If further clarification is needed please contact the office at [phone]          An electronic signature was used to authenticate this note.     --Roslyn Alvarado MD

## 2022-11-04 ENCOUNTER — HOSPITAL ENCOUNTER (OUTPATIENT)
Dept: PHYSICAL THERAPY | Age: 67
Setting detail: THERAPIES SERIES
Discharge: HOME | End: 2022-11-04
Payer: MEDICARE

## 2022-11-04 PROCEDURE — 97161 PT EVAL LOW COMPLEX 20 MIN: CPT | Performed by: PHYSICAL THERAPIST

## 2022-11-04 PROCEDURE — 97140 MANUAL THERAPY 1/> REGIONS: CPT | Performed by: PHYSICAL THERAPIST

## 2022-11-07 NOTE — FLOWSHEET NOTE
The [facility] and [address],  Sports Performance and Rehabilitation, [facility] [address]199 [address]46 [address]  [facility]   [place]  Phone: [phone]  Fax: [phone]     Physical Therapy Treatment Note/ Progress Report:           Date:  2022    Patient Name:  Jonathon Del Angel    :  1955  MRN: [id number]  Restrictions/Precautions:    Medical/Treatment Diagnosis Information:  Right shoulder pain - m25.511      Insurance/Certification information:     Physician Information:     Has the plan of care been signed (Y/N):        []  Yes  [x]  No     Date of Patient follow up with Physician:     Is this a Progress Report:     []  Yes  [x]  No        If Yes:  Date Range for reporting period:  Beginning  Ending    Progress report will be due (10 Rx or 30 days whichever is less):        Recertification will be due (POC Duration  / 90 days whichever is less):          Visit # Insurance Allowable Auth Required   1  []  Yes []  No        Functional Scale:     Date assessed:        Latex Allergy:  [x]NO      []YES  Preferred Language for Healthcare:   [x]English       []other:      Pain level:  7/10     SUBJECTIVE:  See eval    OBJECTIVE: See eval  Observation:   Test measurements:      RESTRICTIONS/PRECAUTIONS:     Exercises/Interventions:       Therapeutic Ex (93693) Sets/sec Reps Notes/CUES   Sleeper stretch   demo    Supine ll/ld er   3'    Supine cane overhead, er       Wall stretch   4 x 10\"    Active ir   4 x 20\"                                              Manual Intervention ([id number])      Prom/stm   13'                                                                                                                                             Therapeutic Exercise and NMR EXR  [x] (45147) Provided verbal/tactile cueing for activities related to strengthening, flexibility, endurance, ROM for improvements in LE, proximal hip, and core control with self care, mobility, lifting, ambulation. [x] (94583) Provided verbal/tactile cueing for activities related to improving balance, coordination, kinesthetic sense, posture, motor skill, proprioception to assist with LE, proximal hip, and core control in self-care, mobility, lifting, ambulation and eccentric single leg control. NMR and Therapeutic Activities:    [x] (82216 or 16855) Provided verbal/tactile cueing for activities related to improving balance, coordination, kinesthetic sense, posture, motor skill, proprioception and motor activation to allow for proper function of core, proximal hip and LE with self-care and ADLs and functional mobility.   [] (11268) Gait Re-education- Provided training and instruction to the patient for proper LE, core and proximal hip recruitment and positioning and eccentric body weight control with ambulation re-education including up and down stairs     Home Exercise Program:    [x] (64320) Reviewed/Progressed HEP activities related to strengthening, flexibility, endurance, ROM of core, proximal hip and LE for functional self-care, mobility, lifting and ambulation/stair navigation   [] (19511) Reviewed/Progressed HEP activities related to improving balance, coordination, kinesthetic sense, posture, motor skill, proprioception of core, proximal hip and LE for self-care, mobility, lifting, and ambulation/stair navigation      Manual Treatments:  PROM / STM / Oscillations-Mobs:  G-I, II, III, IV (PA's, Inf., Post.)  [x] (56265) Provided manual therapy to mobilize LE, proximal hip and/or LS spine soft tissue/joints for the purpose of modulating pain, promoting relaxation, increasing ROM, reducing/eliminating soft tissue swelling/inflammation/restriction, improving soft tissue extensibility and allowing for proper ROM for normal function with self-care, mobility, lifting and ambulation. Modalities:     [] GAME READY (VASO)- for significant edema, swelling, pain control.      Charges:  Timed Code Treatment Minutes: 28'   Total Treatment Minutes: 72'      [x] EVAL (LOW) 70390 (typically 20 minutes face-to-face)  [] EVAL (MOD) 04184 (typically 30 minutes face-to-face)  [] EVAL (HIGH) 92126 (typically 45 minutes face-to-face)  [] RE-EVAL     [] LK(67509) x     [] IONTO  [] NMR (73952) x     [] VASO  [x] Manual (51358) x 1 [] Other:  [] TA x      [] Mech Traction (10117)  [] ES(attended) (05629)      [] ES (un) (72158):         ASSESSMENT:  See eval      GOALS:   Patient stated goal:     [] Progressing: [] Met: [] Not Met: [] Adjusted    Therapist goals for Patient:   Short Term Goals: To be achieved in: 2 weeks  1. Independent in HEP and progression per patient tolerance, in order to prevent re-injury. [] Progressing: [] Met: [] Not Met: [] Adjusted   2. Patient will have a decrease in pain to facilitate improvement in movement, function, and ADLs as indicated by Functional Deficits. [] Progressing: [] Met: [] Not Met: [] Adjusted    Long Term Goals: To be achieved in:   12 weeks  - The patient is expected to demonstrate less than 12% impairment, limitation or restriction in:  - carrying, moving and handling objects. - Patient will demonstrate increased passive and active ROM to full, symmetrical and painfree to allow for proper joint functioning as indicated by patients Functional Deficits. [] Progressing: [] Met: [] Not Met: [] Adjusted  - Patient will demonstrate an increase in Strength to symmetrical and painfree to allow for proper functional mobility as indicated by patients Functional Deficits. [] Progressing: [] Met: [] Not Met: [] Adjusted  - Patient will return to desired, higher level upper extremity functional activities without increased symptoms or restriction. [] Progressing: [] Met: [] Not Met: [] Adjusted          Overall Progression Towards Functional goals/ Treatment Progress Update:  [] Patient is progressing as expected towards functional goals listed.     [] Progression is slowed due to complexities/Impairments listed. [] Progression has been slowed due to co-morbidities. [x] Plan just implemented, too soon to assess goals progression <30days   [] Goals require adjustment due to lack of progress  [] Patient is not progressing as expected and requires additional follow up with physician  [] Other    Prognosis for POC: [x] Good [] Fair  [] Poor      Patient requires continued skilled intervention: [x] Yes  [] No    Treatment/Activity Tolerance:  [x] Patient able to complete treatment  [] Patient limited by fatigue  [] Patient limited by pain    [] Patient limited by other medical complications  [] Other:         Return to Play: (if applicable)   []  Stage 1: Intro to Strength   []  Stage 2: Return to Run and Strength   []  Stage 3: Return to Jump and Strength   []  Stage 4: Dynamic Strength and Agility   []  Stage 5: Sport Specific Training     []  Ready to Return to Play, Meets All Above Stages   []  Not Ready for Return to Sports   Comments:                               PLAN: See eval  [] Continue per plan of care [] Alter current plan (see comments above)  [x] Plan of care initiated [] Hold pending MD visit [] Discharge      Electronically signed by:  Shan Shore, PT, MPT     Note: If patient does not return for scheduled/ recommended follow up visits, this note will serve as a discharge from care along with most recent update on progress.

## 2022-11-07 NOTE — PLAN OF CARE
The [facility] and [address],  Sports Performance and Rehabilitation, [facility] [address]99 [address]  793 [facility],5Th Floor   [place]  Phone: [phone]  Fax: [phone]                                                        Physical Therapy Certification    Dear Dr Siva Lynn  ,    We had the pleasure of evaluating the following patient for physical therapy services at [address] [place] [address]. A summary of our findings can be found in the initial assessment below. This includes our plan of care. If you have any questions or concerns regarding these findings, please do not hesitate to contact me at the office phone number checked above. Thank you for the referral.       Physician Signature:_______________________________Date:__________________  By signing above (or electronic signature), therapists plan is approved by physician      Patient: Supriya Ramirez   : 1955   MRN: [id number]  Referring Physician:        Evaluation Date: 2022      Medical Diagnosis Information:    V42.291 (ICD-10-CM) - Right shoulder pain, unspecified chronicity                                            Insurance information:      Precautions/ Contra-indications:   Latex Allergy:  [x]NO      []YES  Preferred Language for Healthcare:   [x]English       []Other:    C-SSRS Triggered by Intake questionnaire (Past 2 wk assessment):   [x] No, Questionnaire did not trigger screening.   [] Yes, Patient intake triggered further evaluation      [] C-SSRS Screening completed  [] PCP notified via Plan of Care  [] Emergency services notified      SUBJECTIVE: Patient stated complaint:  reports that she has been experiencing incidiously progressive shoulder pain.       Relevant Medical History:  Essential hypertension      Hyperlipidemia         Other Medical History    Diagnosis Date Comment Source   Abnormal EKG  saw cardio, wnl stress test    Allergic rhinitis  [place] Depression      Endometriosis      H/O mammogram 01/25/2018 [place]    History of thrombophlebitis  right leg    Pap smear for cervical cancer screening 08/25/2016     Plantar fasciitis      Recurrent sinus infections      Sleep apnea  cpap    Visit for review of DEXA scan                     Functional Disability Index:  70%    Pain Scale: 7/10    Easing factors: rest     Provocative factors: activity/motion    Night Pain:    - complained of night pain associated with sleep position. Type:   - Constant          Paresthesia complaints:   - no paresthesia complaints       Functional Limitations/Impairments: []  - Lifting  - reaching   - Grooming   - Carrying      - ADL's   - Driving  - Sports/Recreation activity      Occupation/School:   Labor intensive     Living Status/Prior Level of Function: This patient was independent in ADL's and IADL's prior to onset of symptoms. PACEMAKER:  - Denied having a pacemaker that would contraindicate the use of electrical modalities. METAL IMPLANTS:  - Denied metal implants that would contraindicate the use of thermal modalities. CANCER HISTORY:  - Denied a history of cancer that would contraindicate thermal modalities. OBJECTIVE:     ROM:   CERVICAL    SHOULDER: decreased 25% arom global     Joint Mobility: moderately decreased gobal     Strength/Neuromuscular control: 4-/5 shoulder     Dermatomal Sensation:  - Dermatomal sensation was intact to light touch bilaterally throughout upper and lower extremities. Deep tendon reflexes:  - DTR's were symmetrical and intact throughout. Palpation:    Functional Mobility/Transfers:     Posture: + fwd head/kyphosis       Bandages/Dressings/Incisions:     Gait:     Orthopedic Special Tests: + impingement                           [x] Patient history, allergies, meds reviewed. Medical chart reviewed. See intake form.      Review Of Systems (ROS):  [x]Performed Review of systems (Integumentary, CardioPulmonary, Neurological) by intake and observation. Intake form has been scanned into medical record. Patient has been instructed to contact their primary care physician regarding ROS issues if not already being addressed at this time. Objective Review of Systems:  Cognitive, Communication, Behavior and Learning:  - The patient was alert, spoke coherently and was oriented to person, place and time. - Demonstrated no barriers to communication or processing information.  - Appeared literate, spoke [place] and had no significant hearing or vision impairment. - Had no abnormal behavioral responses, learning preferences or educational needs. Cardiopulmonary:  Edema:       Integumentary:  Nail beds:    Skin appearance:      MEDICARE CAP EXCEPTION DOCUMENTATION  I certify that this patient meets one of the below criteria necessary for becoming an exception to the Medicare cap on therapy services:  []  The patient has a condition that has a direct and significant impact on the need for therapy. (Significantly impacts the rate of recovery)  [x]  The patient has a complexity that has a direct and significant impact on the need for therapy. (Significantly impacts the rate of recovery and is associated with a primary condition.)       []  The patient has associated variables that influence the amount of treatment to include:  Social support, self-efficacy/motivation, prognosis, time since onset/acuity. []  The patient has generalized musculoskeletal conditions or a condition affecting multiple sites that will have a direct impact on the rate of recovery. []  The patient had a prior episode of outpatient therapy during this calendar year for a different condition. []  The patient has a mental or cognitive disorder in addition to the condition being treated that will have a direct and significant impact on the rate of recovery.     Falls Risk Assessment (30 days):   [x] Falls Risk assessed and no intervention required. [] Falls Risk assessed and Patient requires intervention due to being higher risk   TUG score (>12s at risk):     [] Falls education provided, including          ASSESSMENT:    Functional Impairments   [x]Noted spinal or UE joint hypomobility   [x]Noted spinal or UE joint hypermobility   [x]Decreased UE functional ROM   [x]Decreased UE functional strength   [x]Abnormal reflexes/sensation/myotomal/dermatomal deficits   [x]Decreased RC/scapular/core strength and neuromuscular control   []other:      Functional Activity Limitations (from functional questionnaire and intake)   [x]Reduced ability to tolerate prolonged functional positions   [x]Reduced ability or difficulty with changes of positions or transfers between positions   [x]Reduced ability to maintain good posture and demonstrate good body mechanics with sitting, bending, and lifting   [x] Reduced ability or tolerance with driving and/or computer work   [x]Reduced ability to sleep   Reduced ability to perform lifting, reaching, carrying tasks   [x][x]Reduced ability to tolerate impact through UE   [x]Reduced ability to reach behind back   [x]Reduced ability to  or hold objects   [x]Reduced ability to throw or toss an object    Participation Restrictions   [x]Reduced participation in self care activities   [x]Reduced participation in home management activities   [x]Reduced participation in work activities   [x]Reduced participation in social activities. [x]Reduced participation in sport activities. Classification :  - ligament or other connective tissue dysfunction. (Pattern 4D)  - localized inflammation.  (Pattern 4E)    Prognosis/Rehab Potential:       - Good     Factors affecting rehab potential:  - associated co-morbidities    Tolerance of evaluation/treatment:     - Good     Physical Therapy Evaluation Complexity Justification  [] A history of present problem with:  [] no personal factors and/or comorbidities that impact the plan of care;  []1-2 personal factors and/or comorbidities that impact the plan of care  []3 personal factors and/or comorbidities that impact the plan of care  [x] An examination of body systems using standardized tests and measures addressing any of the following: body structures and functions (impairments), activity limitations, and/or participation restrictions;:  [x] a total of 1-2 or more elements   [] a total of 3 or more elements   [] a total of 4 or more elements   [x] A clinical presentation with:  [] stable and/or uncomplicated characteristics   [x] evolving clinical presentation with changing characteristics  [] unstable and unpredictable characteristics;   [x] Clinical decision making of [x] low, [] moderate, [] high complexity using standardized patient assessment instrument and/or measurable assessment of functional outcome.     [x] EVAL (LOW) 33957 (typically 30 minutes face-to-face)  [] EVAL (MOD) 56761 (typically 30 minutes face-to-face)  [] EVAL (HIGH) 02464 (typically 45 minutes face-to-face)  [] RE-EVAL         Co-morbidities/Complexities (which will affect course of rehabilitation):   []None           Arthritic conditions   []Rheumatoid arthritis (M05.9)  [x]Osteoarthritis (M19.91)   Cardiovascular conditions   [x]Hypertension (I10)  [x]Hyperlipidemia (E78.5)  []Angina pectoris (I20)  []Atherosclerosis (I70)   Musculoskeletal conditions   []Disc pathology   []Congenital spine pathologies   []Prior surgical intervention  []Osteoporosis (M81.8)  []Osteopenia (M85.8)   Endocrine conditions   []Hypothyroid (E03.9)  []Hyperthyroid Gastrointestinal conditions   []Constipation (A28.12)   Metabolic conditions   []Morbid obesity (E66.01)  []Diabetes type 1(E10.65) or 2 (E11.65)   []Neuropathy (G60.9)     Pulmonary conditions   []Asthma (J45)  []Coughing   []COPD (J44.9)   Psychological Disorders  []Anxiety (F41.9)  []Depression (F32.9)   []Other:   []Other:            PLAN:  Frequency/Duration:  2 days per week for 8 Weeks:  INTERVENTIONS:  1. Therapeutic exercise including: strength training, ROM, NMR and proprioception for the scapula, core and Upper extremity  2. Manual therapy as indicated including Dry Needling/IASTM, STM, PROM, Gr I-IV mobilizations, spinal mobilization/manipulation. 3. Modalities as needed including: thermal agents, E-stim, US, iontophoresis as indicated. 4. Patient education on joint protection, activity modification, progression of HEP. HEP instruction:     GOALS:  Patient stated goal:     Therapist goals for Patient:   Short Term Goals: To be achieved in: 2 weeks  - Independent in HEP and progression per patient tolerance, in order to prevent re-injury. -  Patient will have a decrease in pain to facilitate improvement in movement, function, and ADLs as indicated by Functional Deficits. Long Term Goals: To be achieved in: 12 weeks  - The patient is expected to demonstrate less than 12% impairment, limitation or restriction in:  - carrying, moving and handling objects. - Patient will demonstrate increased passive and active ROM to full, symmetrical and painfree to allow for proper joint functioning as indicated by patients Functional Deficits. - Patient will demonstrate an increase in Strength to symmetrical and painfree to allow for proper functional mobility as indicated by patients Functional Deficits. - Patient will return to desired, higher level upper extremity functional activities without increased symptoms or restriction.       Electronically signed by:  Bobby Rossi PT, MPT

## 2022-11-08 ENCOUNTER — HOSPITAL ENCOUNTER (OUTPATIENT)
Dept: PHYSICAL THERAPY | Age: 67
Setting detail: THERAPIES SERIES
Discharge: HOME | End: 2022-11-08
Payer: MEDICARE

## 2022-11-08 PROCEDURE — 97140 MANUAL THERAPY 1/> REGIONS: CPT | Performed by: PHYSICAL THERAPIST

## 2022-11-08 PROCEDURE — 97110 THERAPEUTIC EXERCISES: CPT | Performed by: PHYSICAL THERAPIST

## 2022-11-10 ENCOUNTER — OFFICE VISIT (OUTPATIENT)
Dept: ORTHOPEDIC SURGERY | Age: 67
End: 2022-11-10
Payer: MEDICARE

## 2022-11-10 VITALS — WEIGHT: 196 LBS | HEIGHT: 69 IN | BODY MASS INDEX: 29.03 KG/M2

## 2022-11-10 DIAGNOSIS — M12.811 ROTATOR CUFF TEAR ARTHROPATHY, RIGHT: ICD-10-CM

## 2022-11-10 DIAGNOSIS — M75.101 ROTATOR CUFF TEAR ARTHROPATHY, RIGHT: ICD-10-CM

## 2022-11-10 DIAGNOSIS — M25.511 RIGHT SHOULDER PAIN, UNSPECIFIED CHRONICITY: Primary | ICD-10-CM

## 2022-11-10 PROCEDURE — 99203 OFFICE O/P NEW LOW 30 MIN: CPT | Performed by: ORTHOPAEDIC SURGERY

## 2022-11-10 PROCEDURE — G8417 CALC BMI ABV UP PARAM F/U: HCPCS | Performed by: ORTHOPAEDIC SURGERY

## 2022-11-10 PROCEDURE — 1036F TOBACCO NON-USER: CPT | Performed by: ORTHOPAEDIC SURGERY

## 2022-11-10 PROCEDURE — 1123F ACP DISCUSS/DSCN MKR DOCD: CPT | Performed by: ORTHOPAEDIC SURGERY

## 2022-11-10 PROCEDURE — 3017F COLORECTAL CA SCREEN DOC REV: CPT | Performed by: ORTHOPAEDIC SURGERY

## 2022-11-10 PROCEDURE — G8484 FLU IMMUNIZE NO ADMIN: HCPCS | Performed by: ORTHOPAEDIC SURGERY

## 2022-11-10 PROCEDURE — G8427 DOCREV CUR MEDS BY ELIG CLIN: HCPCS | Performed by: ORTHOPAEDIC SURGERY

## 2022-11-10 PROCEDURE — G8399 PT W/DXA RESULTS DOCUMENT: HCPCS | Performed by: ORTHOPAEDIC SURGERY

## 2022-11-10 PROCEDURE — 1090F PRES/ABSN URINE INCON ASSESS: CPT | Performed by: ORTHOPAEDIC SURGERY

## 2022-11-10 NOTE — PROGRESS NOTES
Chief Complaint    Shoulder Pain (NP RIGHT SHOULDER)      History of Present Illness:  Betina Truong is a pleasant, RHD 75 y.o. female here today for evaluation of her right shoulder. She has had persistent pain in the shoulder for approximately two weeks without an inciting event. She has pain reaching overhead. She was referred by her PCP to see Shan Shore for formal physical therapy. She was able to get two sessions in and then Alana recommended she come in today to see Dr. Tyron Whipple. She does take Aleve as needed but did not notice an improvement in pain with that medication. 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 Medical History:    No notes on file    Patient's medications, allergies, past medical, surgical, social and family histories were reviewed and updated as appropriate.     Past Medical History:   Diagnosis Date    Abnormal EKG     saw cardio, wnl stress test    Allergic rhinitis     [place]    Depression     Endometriosis     Essential hypertension     H/O mammogram 2018    [place]    History of thrombophlebitis     right leg    Hyperlipidemia     Pap smear for cervical cancer screening 2016    Plantar fasciitis     Recurrent sinus infections     Sleep apnea     cpap    Visit for review of DEXA scan     10/16-wnl T score      Social History     Socioeconomic History    Marital status: Single     Spouse name: Not on file    Number of children: 0    Years of education: Not on file    Highest education level: Not on file   Occupational History    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     Comment: [employer]   Tobacco Use    Smoking status: Former     Packs/day: 1.00     Years: 30.00     Pack years: 30.00     Types: Cigarettes     Quit date: 1986     Years since quittin.8    Smokeless tobacco: Never   Substance and Sexual Activity    Alcohol use: Yes    Drug use: Never    Sexual activity: Not Currently   Other Topics Concern    Not on file   Social History Narrative    Lives alone with dog (kaushal)     Social Determinants of Health     Financial Resource Strain: Low Risk     Difficulty of Paying Living Expenses: Not hard at all   Food Insecurity: No Food Insecurity    Worried About Running Out of Food in the Last Year: Never true    Ran Out of Food in the Last Year: Never true   Transportation Needs: Not on file   Physical Activity: Not on file   Stress: Not on file   Social Connections: Not on file   Intimate Partner Violence: Not on file   Housing Stability: Not on file       Allergies   Allergen Reactions    Cephalosporins Hives and Shortness Of Breath    Penicillins Anaphylaxis and Rash    Atorvastatin      Muscle weakness    Hazelnut (Filbert) Allergy Skin Test     Lisinopril      Sensation in throat    Peanut-Containing Drug Products      Current Outpatient Medications on File Prior to Visit   Medication Sig Dispense Refill    losartan (COZAAR) 100 MG tablet Take one and a half tablets by mouth every day 90 tablet 1    atenolol (TENORMIN) 25 MG tablet TAKE 1 TABLET BY MOUTH EVERY DAY 90 tablet 1    rosuvastatin (CRESTOR) 40 MG tablet TAKE 1 TABLET BY MOUTH EVERY DAY 90 tablet 1    venlafaxine (EFFEXOR XR) 150 MG extended release capsule TAKE 1 CAPSULE BY MOUTH EVERY DAY 90 capsule 1    Cholecalciferol 50 MCG (2000 UT) TABS Take 1,000 Units by mouth daily       calcium citrate (CALCITRATE) 250 MG TABS tablet Take 1 tablet by mouth daily 60 tablet 0    Omega-3 Fatty Acids (FISH OIL) 1000 MG CAPS Take 1 capsule by mouth daily      Cyanocobalamin (VITAMIN B12 PO) Take by mouth      VITAMIN D PO Take by mouth      Multiple Vitamins-Minerals (MULTIVITAMIN ADULT PO) Take by mouth      vitamin C (ASCORBIC ACID) 500 MG tablet Take 500 mg by mouth daily       No current facility-administered medications on file prior to visit.        Review of Systems  A 14 point review of systems was completed by the patient on 11/10/2022 and is available in the media section of the scanned medical record and was reviewed on 11/10/2022. The review is negative with the exception of those things mentioned in the HPI and Past Medical History    Vital Signs: There were no vitals filed for this visit. General Exam:   Constitutional: Patient is adequately groomed with no evidence of malnutrition  DTRs: Deep tendon reflexes are intact  Mental Status: The patient is oriented to time, place and person. The patient's mood and affect are appropriate. Lymphatic: The lymphatic examination bilaterally reveals all areas to be without enlargement or induration. Vascular: Examination reveals no swelling or calf tenderness. Peripheral pulses are palpable and 2+. Neurological: The patient has good coordination. There is no weakness or sensory deficit. Right Shoulder Examination:    Inspection:  No skin lesions, no obvious deformity, no swelling. Palpation:  Tenderness over posterior joint line, Mild tenderness over the rotator cuff    Active Range of Motion: Forward Elevation 110 with pain vs 150, Internal Rotation walking up to L1    Passive Range of Motion: Forward Elevation 170 with pain on arm descent    Strength:  External Rotation 2/5, Supraspinatus 3/5    Special Tests:  40 degree external rotation lag, Positive Matthew's, Positive Speed's, questionable bear hug, No Ari deformity. Negative belly press,    Neurovascular: Palpable radial pulse, normal sensation in the median, ulnar, radial nerve distributions    Additional Examinations:    Examination of the contralateral extremity does not show any tenderness, deformity or injury. Range of motion is unremarkable. There is no gross instability. There are no rashes, ulcerations or lesions. Strength and tone are normal.      Self assessment questionnaires were completed today.       Radiology:     Plain radiographs of the right shoulder, comprising 3 views: AP, Scapular Y and Axillary lateral were  obtained and reviewed in the office:    Impression: rotator cuff arthropathy, upward migration of the humeral head, acromion to tuberosity height is narrowed, modeling of the humeral head. Assessment :  Corbin Salinas is a pleasant 75 y.o. female with pain related to right shoulder rotator cuff arthropathy. She compensates remarkably well. Impression:  Encounter Diagnoses   Name Primary? Right shoulder pain, unspecified chronicity Yes    Rotator cuff tear arthropathy, right        Office Procedures:  Orders Placed This Encounter   Procedures    XR SHOULDER RIGHT 1 VW     Standing Status:   Future     Number of Occurrences:   1     Standing Expiration Date:   11/8/2023     Order Specific Question:   Reason for exam:     Answer:   right shoulder pain    MRI SHOULDER RIGHT WO CONTRAST     Standing Status:   Future     Standing Expiration Date:   11/10/2023     Order Specific Question:   Reason for exam:     Answer:   R/O  Malignancy Right proximal humerus       Treatment Plan:  Pertinent imaging was reviewed. The etiology, natural history, and treatment options for the disorder were discussed. The roles of activity modifications, medications, cryotherapy and heat, injections, physical therapy, and surgical interventions were all described to the patient and questions were answered. We would like to gain more information regarding the shoulder. Her radiographs today demonstrate remodeling of the joint. We would like to order an MRI to rule out malignancy. She does have options moving forward we will discuss this once she has obtained the MRI. This patient is agreeable to this plan. Corbin Salinas will follow up in 1-2 weeks for imaging results and/or as needed. They were in agreement with this plan and all questions were answered to the patient's satisfaction. They were encouraged to call with any questions.        11:02 AM  11/10/2022    Floresita Durham ATC  [facility]    During this examination, I, Andrew Carbajal, functioned as a scribe for Dr. Belinda Lindsay. The history taking and physical examination were performed by Dr. Joan Cano. All counseling during the appointment was performed between the patient and Dr. Joan Cano. 11/10/22  ______________________  I, Dr. Belinda Lindsay, personally performed the services described in this documentation as described by King Murillo ATC in my presence, and it is both accurate and complete. Dominick Cano MD, PhD  11/10/2022

## 2022-11-12 NOTE — FLOWSHEET NOTE
The [address] [facility] and [address],  Sports Performance and Rehabilitation, [facility] [address] [place] [address] Denis  Phone: [phone]  Fax: [phone]     Physical Therapy Treatment Note/ Progress Report:           Date:  2022    Patient Name:  Sarah Dennison    :  1955  MRN: [id number]  Restrictions/Precautions:    Medical/Treatment Diagnosis Information:  Right shoulder pain - m25.511      Insurance/Certification information:     Physician Information:     Has the plan of care been signed (Y/N):        []  Yes  [x]  No     Date of Patient follow up with Physician:     Is this a Progress Report:     []  Yes  [x]  No        If Yes:  Date Range for reporting period:  Beginning  Ending    Progress report will be due (10 Rx or 30 days whichever is less):        Recertification will be due (POC Duration  / 90 days whichever is less):          Visit # Insurance Allowable Auth Required   2  []  Yes []  No        Functional Scale:     Date assessed:        Latex Allergy:  [x]NO      []YES  Preferred Language for Healthcare:   [x]English       []other:      Pain level:  5/10     SUBJECTIVE:  \"I am feeling better\"        OBJECTIVE:  moderate post capsule tightness   Observation:   Test measurements:      RESTRICTIONS/PRECAUTIONS:     Exercises/Interventions:       Therapeutic Ex (02942) Sets/sec Reps Notes/CUES   Sleeper stretch   demo    Supine ll/ld er   3'    Supine cane overhead, er       Wall stretch   4 x 10\"    Active ir   4 x 20\"    Sidelying er   20x     Prone: rows, m trap, ext   25x each    Supine punches   25x     T bands:  rows   30x green    Scaptions   20x                 Manual Intervention ([id number])      Prom/stm   15'                                                                                                                                             Therapeutic Exercise and NMR EXR  [x] (61950) Provided verbal/tactile cueing for activities related to strengthening, flexibility, endurance, ROM for improvements in LE, proximal hip, and core control with self care, mobility, lifting, ambulation. [x] (57146) Provided verbal/tactile cueing for activities related to improving balance, coordination, kinesthetic sense, posture, motor skill, proprioception to assist with LE, proximal hip, and core control in self-care, mobility, lifting, ambulation and eccentric single leg control.      NMR and Therapeutic Activities:    [x] (36354 or 26090) Provided verbal/tactile cueing for activities related to improving balance, coordination, kinesthetic sense, posture, motor skill, proprioception and motor activation to allow for proper function of core, proximal hip and LE with self-care and ADLs and functional mobility.   [] (23040) Gait Re-education- Provided training and instruction to the patient for proper LE, core and proximal hip recruitment and positioning and eccentric body weight control with ambulation re-education including up and down stairs     Home Exercise Program:    [x] (39559) Reviewed/Progressed HEP activities related to strengthening, flexibility, endurance, ROM of core, proximal hip and LE for functional self-care, mobility, lifting and ambulation/stair navigation   [] (09602) Reviewed/Progressed HEP activities related to improving balance, coordination, kinesthetic sense, posture, motor skill, proprioception of core, proximal hip and LE for self-care, mobility, lifting, and ambulation/stair navigation      Manual Treatments:  PROM / STM / Oscillations-Mobs:  G-I, II, III, IV (PA's, Inf., Post.)  [x] (40651) Provided manual therapy to mobilize LE, proximal hip and/or LS spine soft tissue/joints for the purpose of modulating pain, promoting relaxation, increasing ROM, reducing/eliminating soft tissue swelling/inflammation/restriction, improving soft tissue extensibility and allowing for proper ROM for normal function with self-care, mobility, lifting and ambulation. Modalities:     [] GAME READY (VASO)- for significant edema, swelling, pain control. Charges:  Timed Code Treatment Minutes: 35'   Total Treatment Minutes: 28'      [] EVAL (LOW) 78988 (typically 20 minutes face-to-face)  [] EVAL (MOD) 35665 (typically 30 minutes face-to-face)  [] EVAL (HIGH) 93158 (typically 45 minutes face-to-face)  [] RE-EVAL     [x] KP(16544) x   1  [] IONTO  [] NMR (57400) x     [] VASO  [x] Manual (82361) x 1 [] Other:  [] TA x      [] Mech Traction (18735)  [] ES(attended) (49011)      [] ES (un) (40683):         ASSESSMENT:  See eval      GOALS:   Patient stated goal:     [x] Progressing: [] Met: [] Not Met: [] Adjusted    Therapist goals for Patient:   Short Term Goals: To be achieved in: 2 weeks  1. Independent in HEP and progression per patient tolerance, in order to prevent re-injury. [x] Progressing: [] Met: [] Not Met: [] Adjusted   2. Patient will have a decrease in pain to facilitate improvement in movement, function, and ADLs as indicated by Functional Deficits. [x] Progressing: [] Met: [] Not Met: [] Adjusted    Long Term Goals: To be achieved in:   12 weeks  - The patient is expected to demonstrate less than 12% impairment, limitation or restriction in:  - carrying, moving and handling objects. - Patient will demonstrate increased passive and active ROM to full, symmetrical and painfree to allow for proper joint functioning as indicated by patients Functional Deficits. [x] Progressing: [] Met: [] Not Met: [] Adjusted  - Patient will demonstrate an increase in Strength to symmetrical and painfree to allow for proper functional mobility as indicated by patients Functional Deficits. [x] Progressing: [] Met: [] Not Met: [] Adjusted  - Patient will return to desired, higher level upper extremity functional activities without increased symptoms or restriction.       [x] Progressing: [] Met: [] Not Met: [] Adjusted          Overall Progression Towards Functional goals/ Treatment Progress Update:  [] Patient is progressing as expected towards functional goals listed. [] Progression is slowed due to complexities/Impairments listed. [] Progression has been slowed due to co-morbidities. [x] Plan just implemented, too soon to assess goals progression <30days   [] Goals require adjustment due to lack of progress  [] Patient is not progressing as expected and requires additional follow up with physician  [] Other    Prognosis for POC: [x] Good [] Fair  [] Poor      Patient requires continued skilled intervention: [x] Yes  [] No    Treatment/Activity Tolerance:  [x] Patient able to complete treatment  [] Patient limited by fatigue  [] Patient limited by pain    [] Patient limited by other medical complications  [] Other:         Return to Play: (if applicable)   []  Stage 1: Intro to Strength   []  Stage 2: Return to Run and Strength   []  Stage 3: Return to Jump and Strength   []  Stage 4: Dynamic Strength and Agility   []  Stage 5: Sport Specific Training     []  Ready to Return to Play, Meets All Above Stages   []  Not Ready for Return to Sports   Comments:                               PLAN: See eval  [x] Continue per plan of care [] Alter current plan (see comments above)  [] Plan of care initiated [] Hold pending MD visit [] Discharge      Electronically signed by:  Orlando Dominguez PT, MPT     Note: If patient does not return for scheduled/ recommended follow up visits, this note will serve as a discharge from care along with most recent update on progress.

## 2022-11-16 ENCOUNTER — OFFICE VISIT (OUTPATIENT)
Dept: ORTHOPEDIC SURGERY | Age: 67
End: 2022-11-16
Payer: MEDICARE

## 2022-11-16 VITALS — BODY MASS INDEX: 29.03 KG/M2 | HEIGHT: 69 IN | WEIGHT: 196 LBS

## 2022-11-16 DIAGNOSIS — M25.511 RIGHT SHOULDER PAIN, UNSPECIFIED CHRONICITY: Primary | ICD-10-CM

## 2022-11-16 DIAGNOSIS — M75.101 ROTATOR CUFF TEAR ARTHROPATHY, RIGHT: ICD-10-CM

## 2022-11-16 DIAGNOSIS — M12.811 ROTATOR CUFF TEAR ARTHROPATHY, RIGHT: ICD-10-CM

## 2022-11-16 PROCEDURE — L3670 SO ACRO/CLAV CAN WEB PRE OTS: HCPCS | Performed by: ORTHOPAEDIC SURGERY

## 2022-11-16 PROCEDURE — G8399 PT W/DXA RESULTS DOCUMENT: HCPCS | Performed by: ORTHOPAEDIC SURGERY

## 2022-11-16 PROCEDURE — G8484 FLU IMMUNIZE NO ADMIN: HCPCS | Performed by: ORTHOPAEDIC SURGERY

## 2022-11-16 PROCEDURE — 99214 OFFICE O/P EST MOD 30 MIN: CPT | Performed by: ORTHOPAEDIC SURGERY

## 2022-11-16 PROCEDURE — 3017F COLORECTAL CA SCREEN DOC REV: CPT | Performed by: ORTHOPAEDIC SURGERY

## 2022-11-16 PROCEDURE — 1090F PRES/ABSN URINE INCON ASSESS: CPT | Performed by: ORTHOPAEDIC SURGERY

## 2022-11-16 PROCEDURE — 1123F ACP DISCUSS/DSCN MKR DOCD: CPT | Performed by: ORTHOPAEDIC SURGERY

## 2022-11-16 PROCEDURE — G8417 CALC BMI ABV UP PARAM F/U: HCPCS | Performed by: ORTHOPAEDIC SURGERY

## 2022-11-16 PROCEDURE — 1036F TOBACCO NON-USER: CPT | Performed by: ORTHOPAEDIC SURGERY

## 2022-11-16 PROCEDURE — G8428 CUR MEDS NOT DOCUMENT: HCPCS | Performed by: ORTHOPAEDIC SURGERY

## 2022-11-16 NOTE — PROGRESS NOTES
[facility]  History and Physical  Shoulder Pain    Date:  2022    Name:  Ashlyn Giron  Address:  [address] [address]    :  1955      Age:   75 y.o.    SSN:  [id number]      Medical Record Number:  [id number]    Reason for Visit:    Follow-up (Right Shoulder - MRI f/u)      HPI:   Ashlyn Giron is a 75 y.o. female who presents to our office today for follow up of the right shoulder pain. The patient was found to have rotator cuff arthropathy of the right shoulder. She was asked to obtain an MRI of that shoulder. The patient reports that she continues to have a tremendous amount of weakness with this. She has been working with Mamadou Ayala in physical therapy and feels that her movement has improved however continues to have some persistent symptoms of pain and discomfort especially at night and with overhead lifting. She denies any new injuries. Review of Systems:  A 14 point review of systems available in the scanned medical record as documented by the patient and reviewed on 2022. The review is negative with the exception of those things mentioned in the History of Present Illness and Past Medical History. Past Medical History:  Patient's medications, allergies, past medical, surgical, social and family histories were reviewed and updated as appropriate. Allergies: Allergies   Allergen Reactions    Cephalosporins Hives and Shortness Of Breath    Penicillins Anaphylaxis and Rash    Atorvastatin      Muscle weakness    Hazelnut (Filbert) Allergy Skin Test     Lisinopril      Sensation in throat    Peanut-Containing Drug Products        Physical Exam:  There were no vitals filed for this visit. General: Ashlyn Giron is a healthy and well appearing 75 y.o. female who is sitting comfortably in our office in acute distress. Skin:  There are no skin lesions, cellulitis, or extreme edema.  The patient has warm and well-perfused Bilateral upper extremities with brisk capillary refill. Eyes: Extra-ocular muscles intact  Mouth: Oral mucosa moist. No perioral lesions  Pulm: Respirations unlabored and regular. Neuro: Alert and oriented x3    right Shoulder Exam:  Inspection:  No gross deformities, no signs of infection. Palpation:  There is crepitus. Tenderness to palpation over the rotator cuff footprint. Non-tender to palpation over the posterior joint line. Active Range of Motion: Forward elevation of 150, abduction of 150, external rotation with elbow at the side 20, internal rotation to the back is L1    Passive Range of Motion: Passively external rotation can be further increased to 40 with a 20 degrees external rotation lag. .    Strength: External rotation with resistance with elbow at the side 3/5, internal rotation with resistance with elbow at the side 4/5, Champagne toast testing -4/5, Jobes test -4/5    Special Tests:  positive external rotation lag, No Ari muscle deformity. Neurovascular: Sensation to light touch is intact, no motor deficits, palpable radial pulses 2+    Additional Examinations:    Examination of the contralateral extremity does not show any tenderness, deformity or injury. Range of motion is unremarkable. There is no gross instability. There are no rashes, ulcerations or lesions. Strength and tone are normal.      Radiographic:  MRI right shoulder 11/14/2022       CONCLUSION:   Massive rotator cuff tear. 1. Chronic complete full-depth tears of the supraspinatus and infraspinatus tendon with    retraction to the level of the glenohumeral joint. Superior humeral head decentering in    relation to the glenoid. 2. High-grade partial depth undersurface subscapularis tendon tear. 3. Torn and retracted long head biceps tendon with confluent hypertrophic tendinosis and    high-grade tear of the extra-articular long head segment within the bicipital groove.    4. Moderate to marked volumetric/fatty atrophy of the rotator cuff. 5. Grade 1 infraspinatus and teres minor muscle strain. 6. Teres minor volumetric/fatty atrophy without mass within the quadrilateral space may be    secondary to a postneuritis/viral causes. 7. Moderate glenohumeral osteoarthritis. Chronic SLAP 2B lesion. 8. Capsulosynovitis, large in size with marked associated distension. Fibroinflammatory changes    of the capsule. 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 9. Heterogeneous marrow signal which may be seen with etiologies such as anemia, chronic renal    disease, obesity; infiltrative marrow process/myeloproliferative/lymphoproliferative disorders    and/or smoking. Correlate with CBC lab values. 10. No osseous expansion or evidence of cortical breakthrough. Assessment:  Wm Banks is a 75 y.o. female with a right chronic rotator cuff tear with moderate muscle atrophy and significant rotator cuff arthropathy. Impression:  Encounter Diagnoses   Name Primary? Right shoulder pain, unspecified chronicity Yes    Rotator cuff tear arthropathy, right        Office Procedures:  Orders Placed This Encounter   Procedures    DJO ultrasling IV Shoulder Sling     Patient was prescribed a DJO Ultrasling IV Shoulder Brace. The right shoulder will require stabilization / immobilization from this orthosis. The orthosis will assist in protecting the affected area, provide functional support and facilitate healing. The device was ordered and fit on 11/16/22. The patient was educated and fit by a healthcare professional with expert knowledge and specialization in brace application while under the direct supervision of the treating physician. Verbal and written instructions for the use of and application of this item were provided.    They were instructed to contact the office immediately should the brace result in increased pain, decreased sensation, increased swelling or worsening of the condition. Plan: We reviewed the MRI with the patient and we had a lengthy discussion with her regarding the available treatment options. We recommend that her next best option is a right reverse total shoulder arthroplasty with a latissimus transfer. We discussed the surgery in full detail along with risk benefits and postoperative recovery. Proceeded to fit the patient with an UltraSling brace today    Risks, benefits and potential complications of total shoulder arthroplasty surgery were discussed with the patient. Risks discussed include but are not limited to bleeding, infection, anesthetic risk, injury to nerves and blood vessels, deep vein thrombosis, residual stiffness and weakness, and the need for revision surgery. The patient also understands that anesthetic risks include cardiopulmonary issues, drug reactions and even death. The patient voices an understanding of the importance of physical therapy and home exercises after surgery. All questions were answered and written informed consent for surgery was obtained today. Pb Contreras will follow up in 4 weeks and/or as needed. He was in agreement with this plan and all questions were answered to the patient's satisfaction. He was encouraged to call with any questions. 11/16/2022  3:30 PM      Yoan Jara PA-C  Orthopaedic Sports Medicine Physician Assistant    During this examination, Yoan YORK PA-C, functioned as a scribe for Dr. Dante Monteiro. This dictation was performed with a verbal recognition program (DRAGON) and it was checked for errors. It is possible that there are still dictated errors within this office note. If so, please bring any errors to my attention for an addendum.   All efforts were made to ensure that this office note is accurate.  _________________  I, Dr. Dante Monteiro, personally performed the services described in this documentation as described by Dilshad Worley PA-C in my presence, and it is both accurate and complete. Dominick Villa MD, PhD  11/16/2022

## 2022-12-05 RX ORDER — VENLAFAXINE HYDROCHLORIDE 150 MG/1
CAPSULE, EXTENDED RELEASE ORAL
Qty: 90 CAPSULE | Refills: 1 | Status: SHIPPED | OUTPATIENT
Start: 2022-12-05

## 2022-12-05 NOTE — TELEPHONE ENCOUNTER
Last appointment: 11/2/2022  Next appointment: 12/19/2022  Last refill: 6/13/2022 PHOTOGRAPHS: I have reviewed the external ocular photographs of this patient which show the following: lesion on the left lower eyelid.

## 2022-12-13 ENCOUNTER — TELEPHONE (OUTPATIENT)
Dept: ORTHOPEDIC SURGERY | Age: 67
End: 2022-12-13

## 2022-12-14 NOTE — TELEPHONE ENCOUNTER
LEFT VM THAT UNLESS SHE HAS CHANGED INSURANCE, WE ARE GOOD FOR HER SURGERY IN [facility]

## 2022-12-15 ENCOUNTER — TELEMEDICINE (OUTPATIENT)
Dept: PULMONOLOGY | Age: 67
End: 2022-12-15
Payer: MEDICARE

## 2022-12-15 DIAGNOSIS — G47.33 OBSTRUCTIVE SLEEP APNEA SYNDROME: Chronic | ICD-10-CM

## 2022-12-15 DIAGNOSIS — I10 ESSENTIAL HYPERTENSION: Chronic | ICD-10-CM

## 2022-12-15 DIAGNOSIS — F32.A DEPRESSION, UNSPECIFIED DEPRESSION TYPE: Chronic | ICD-10-CM

## 2022-12-15 PROCEDURE — G8399 PT W/DXA RESULTS DOCUMENT: HCPCS | Performed by: INTERNAL MEDICINE

## 2022-12-15 PROCEDURE — 1090F PRES/ABSN URINE INCON ASSESS: CPT | Performed by: INTERNAL MEDICINE

## 2022-12-15 PROCEDURE — 99214 OFFICE O/P EST MOD 30 MIN: CPT | Performed by: INTERNAL MEDICINE

## 2022-12-15 PROCEDURE — 1123F ACP DISCUSS/DSCN MKR DOCD: CPT | Performed by: INTERNAL MEDICINE

## 2022-12-15 PROCEDURE — G8427 DOCREV CUR MEDS BY ELIG CLIN: HCPCS | Performed by: INTERNAL MEDICINE

## 2022-12-15 PROCEDURE — 3017F COLORECTAL CA SCREEN DOC REV: CPT | Performed by: INTERNAL MEDICINE

## 2022-12-15 ASSESSMENT — SLEEP AND FATIGUE QUESTIONNAIRES
HOW LIKELY ARE YOU TO NOD OFF OR FALL ASLEEP WHEN YOU ARE A PASSENGER IN A CAR FOR AN HOUR WITHOUT A BREAK: 0
HOW LIKELY ARE YOU TO NOD OFF OR FALL ASLEEP WHILE SITTING AND READING: 0
HOW LIKELY ARE YOU TO NOD OFF OR FALL ASLEEP WHILE SITTING QUIETLY AFTER LUNCH WITHOUT ALCOHOL: 0
HOW LIKELY ARE YOU TO NOD OFF OR FALL ASLEEP WHILE WATCHING TV: 0
HOW LIKELY ARE YOU TO NOD OFF OR FALL ASLEEP IN A CAR, WHILE STOPPED FOR A FEW MINUTES IN TRAFFIC: 0
ESS TOTAL SCORE: 2
HOW LIKELY ARE YOU TO NOD OFF OR FALL ASLEEP WHILE SITTING INACTIVE IN A PUBLIC PLACE: 0
HOW LIKELY ARE YOU TO NOD OFF OR FALL ASLEEP WHILE LYING DOWN TO REST IN THE AFTERNOON WHEN CIRCUMSTANCES PERMIT: 2
HOW LIKELY ARE YOU TO NOD OFF OR FALL ASLEEP WHILE SITTING AND TALKING TO SOMEONE: 0

## 2022-12-15 NOTE — PROGRESS NOTES
[facility]il P[facility]  [address] [facility], [address] [facility]- [phone]   [facility] [address] Rae White. [address] [place] [address]. Mckinley Nunez [address] [phone]     Video Visit- Follow up    Sarah Dennison, was evaluated through a synchronous (real-time) audio-video  encounter. The patient (or guardian if applicable) is aware that this is a billable  service, which includes applicable co-pays. This Virtual Visit was conducted with  patient's (and/or legal guardian's) consent. The visit was conducted pursuant to  the emergency declaration under the [address] [place] [address] authority and the [employer] and  [place] General Act. Patient identification was verified,  and a caregiver was present when appropriate. The patient was located in a  state where the provider was licensed to provide care. Assessment/Plan:      1. Obstructive sleep apnea syndrome  Assessment & Plan:  Chronic-Stable: Reviewed and analyzed results of physiologic download from patient's machine and reviewed with patient. Supplies and parts as needed for her machine. These are medically necessary. Limit caffeine use after 3pm. Based on the analyzed data will change following settings: Pmin-9  Pmax-19     Will contact the patient's DME company to see if they can get her mask refit done. 2. Essential hypertension  Assessment & Plan:  Chronic- Stable. Discussed the importance of treating sleep apnea as part of the management of this disorder. Cont any meds per PCP and other physicians. 3. Depression, unspecified depression type  Assessment & Plan:  Chronic- Stable. Discussed the importance of treating sleep apnea as part of the management of this disorder. Cont any meds per PCP and other physicians. Reviewed, analyzed, and documented physiologic data from patient's PAP machine.     Reminded patient to tell her anesthesiologist that she has sleep apnea and to take her machine with her to surgery. This information was analyzed to assess complexity and medical decision making in regards to further testing and management. Diagnoses of Obstructive sleep apnea syndrome, Essential hypertension, and Depression, unspecified depression type were pertinent to this visit. The chronic medical conditions listed are directly related to the primary diagnosis listed above. The management of the primary diagnosis affects the secondary diagnosis and vice versa. Subjective:     Patient ID: Xin Vaughan is a 75 y.o. female. Chief Complaint   Patient presents with    Sleep Apnea     Subjective   HPI:    Machine Modem/Download Info:  Compliance (hours/night): 5.5 hrs/night  % of nights >= 4 hrs: 52.2 %  Download AHI (/hour): 8.2 /HR CPAP - Settings  Pressure: 9 cmH2O  Manometer: 9 cmH2O  Altitude: 1     Comfort Settings  Humidity Level (0-8): 2  Heated Tubing (Yes/No): Yes  Flex/EPR (0-3): 3       Got her replaced machine which is auto capable. Sleeping well but having a lot of mask leak. She was sent to 1 mask by her equipment company but has not been successful in getting hold of someone to get a replacement proper mask. When she can sleep longer she does feel better and feels more rested.     [address]    Oxford - Oxford Sleepiness Score: 2    Current Outpatient Medications   Medication Instructions    atenolol (TENORMIN) 25 MG tablet TAKE 1 TABLET BY MOUTH EVERY DAY    calcium citrate (CALCITRATE) 250 mg, Oral, DAILY    Cholecalciferol 1,000 Units, Oral, DAILY    Cyanocobalamin (VITAMIN B12 PO) Oral    fish oil 1,000 mg, Oral, DAILY    losartan (COZAAR) 100 MG tablet Take one and a half tablets by mouth every day    Multiple Vitamins-Minerals (MULTIVITAMIN ADULT PO) Oral    rosuvastatin (CRESTOR) 40 MG tablet TAKE 1 TABLET BY MOUTH EVERY DAY    venlafaxine (EFFEXOR XR) 150 MG extended release capsule TAKE 1 CAPSULE BY MOUTH EVERY DAY    vitamin C (ASCORBIC ACID) 500 mg, Oral, DAILY    VITAMIN D PO Oral        Electronically signed by Lucy Sanchez MD on 12/15/2022 at 2:08 PM

## 2022-12-15 NOTE — PROGRESS NOTES
Sera Esqueda         : 1955    Diagnosis: [x] DEJA (G47.33) [] CSA (G47.31) [] Apnea (G47.30)   Length of Need: [x] 18 Months [] 99 Months [] Other:    Machine (JONA!): [] Respironics Dream Station      Auto [] ResMed AirSense     Auto [] Other:     []  CPAP () [] Bilevel ()   Mode: [] Auto [] Spontaneous    Mode: [] Auto [] Spontaneous            Comfort Settings:        Humidifier: [] Heated ()        [x] Water chamber replacement ()/ 1 per 6 months        Mask:   [x] Nasal () /1 per 3 months [] Full Face () /1 per 3 months   [x] Patient choice -Size and fit mask [] Patient Choice - Size and fit mask   [] Dispense:  [] Dispense:    [x] Headgear () / 1 per 3 months [] Headgear () / 1 per 3 months   [x] Replacement Nasal Cushion ()/2 per month [] Interface Replacement ()/1 per month   [x] Replacement Nasal Pillows ()/2 per month         Tubing: [x] Heated ()/1 per 3 months    [] Standard ()/1 per 3 months [] Other:           Filters: [x] Non-disposable ()/1 per 6 months     [x] Ultra-Fine, Disposable ()/2 per month        Miscellaneous: [] Chin Strap ()/ 1 per 6 months [] O2 bleed-in:       LPM   [] Oximetry on CPAP/Bilevel []  Other:    [x] Modem: ()         Start Order Date: 12/15/22    MEDICAL JUSTIFICATION:  I, the undersigned, certify that the above prescribed supplies are medically necessary for this patients wellbeing. In my opinion, the supplies are both reasonable and necessary in reference to accepted standards of medicalpractice in treatment of this patients condition.     Mark Fields MD      NPI: [id number]        Order Signed Date: 12/15/22    Electronically signed by Mark Fields MD on 12/15/2022 at 2:01 PM    Vergil Hands  1955  [address]  [phone] (home)   [phone] (mobile)      Insurance Info (confirm with patient if correct):  Payer/Plan Subscr  Sex Relation Sub. Ins. ID Effective Group Num

## 2022-12-15 NOTE — ASSESSMENT & PLAN NOTE
Chronic-Stable: Reviewed and analyzed results of physiologic download from patient's machine and reviewed with patient. Supplies and parts as needed for her machine. These are medically necessary. Limit caffeine use after 3pm. Based on the analyzed data will change following settings: Pmin-9  Pmax-19     Will contact the patient's DME company to see if they can get her mask refit done.

## 2022-12-15 NOTE — LETTER
[facility]  [place] [address]  Phone: [phone]  Fax: [phone]    Vallerie Bumpers, MD    December 15, 2022     Sharon Branch, [address] [place] [address]    Patient: Peter Diggs   MR Number: [id number]   YOB: 1955   Date of Visit: 12/15/2022       Dear Sharon Branch:    Thank you for referring Carlota Chung to me for evaluation/treatment. Below are the relevant portions of my assessment and plan of care. 1. Obstructive sleep apnea syndrome  Assessment & Plan:  Chronic-Stable: Reviewed and analyzed results of physiologic download from patient's machine and reviewed with patient. Supplies and parts as needed for her machine. These are medically necessary. Limit caffeine use after 3pm. Based on the analyzed data will change following settings: Pmin-9  Pmax-19     Will contact the patient's DME company to see if they can get her mask refit done. 2. Essential hypertension  Assessment & Plan:  Chronic- Stable. Discussed the importance of treating sleep apnea as part of the management of this disorder. Cont any meds per PCP and other physicians. 3. Depression, unspecified depression type  Assessment & Plan:  Chronic- Stable. Discussed the importance of treating sleep apnea as part of the management of this disorder. Cont any meds per PCP and other physicians. Reviewed, analyzed, and documented physiologic data from patient's PAP machine. Reminded patient to tell her anesthesiologist that she has sleep apnea and to take her machine with her to surgery. This information was analyzed to assess complexity and medical decision making in regards to further testing and management. Diagnoses of Obstructive sleep apnea syndrome, Essential hypertension, and Depression, unspecified depression type were pertinent to this visit.  The chronic medical conditions listed are directly related to the primary diagnosis listed above. The management of the primary diagnosis affects the secondary diagnosis and vice versa. If you have questions, please do not hesitate to call me. I look forward to following Raffaele along with you.     Sincerely,      Martir Headley MD

## 2022-12-19 ENCOUNTER — TELEPHONE (OUTPATIENT)
Dept: ORTHOPEDIC SURGERY | Age: 67
End: 2022-12-19

## 2022-12-19 ENCOUNTER — OFFICE VISIT (OUTPATIENT)
Dept: INTERNAL MEDICINE CLINIC | Age: 67
End: 2022-12-19
Payer: MEDICARE

## 2022-12-19 VITALS
WEIGHT: 195.2 LBS | OXYGEN SATURATION: 99 % | DIASTOLIC BLOOD PRESSURE: 82 MMHG | HEART RATE: 75 BPM | HEIGHT: 69 IN | SYSTOLIC BLOOD PRESSURE: 138 MMHG | BODY MASS INDEX: 28.91 KG/M2

## 2022-12-19 DIAGNOSIS — I80.9 THROMBOPHLEBITIS: ICD-10-CM

## 2022-12-19 DIAGNOSIS — Z01.818 PREOP EXAMINATION: Primary | ICD-10-CM

## 2022-12-19 DIAGNOSIS — R94.31 ABNORMAL EKG: ICD-10-CM

## 2022-12-19 DIAGNOSIS — I10 ESSENTIAL HYPERTENSION: ICD-10-CM

## 2022-12-19 DIAGNOSIS — Z00.00 MEDICARE ANNUAL WELLNESS VISIT, SUBSEQUENT: ICD-10-CM

## 2022-12-19 PROCEDURE — 93000 ELECTROCARDIOGRAM COMPLETE: CPT | Performed by: INTERNAL MEDICINE

## 2022-12-19 PROCEDURE — G8417 CALC BMI ABV UP PARAM F/U: HCPCS | Performed by: INTERNAL MEDICINE

## 2022-12-19 PROCEDURE — G8484 FLU IMMUNIZE NO ADMIN: HCPCS | Performed by: INTERNAL MEDICINE

## 2022-12-19 PROCEDURE — 3078F DIAST BP <80 MM HG: CPT | Performed by: INTERNAL MEDICINE

## 2022-12-19 PROCEDURE — G8427 DOCREV CUR MEDS BY ELIG CLIN: HCPCS | Performed by: INTERNAL MEDICINE

## 2022-12-19 PROCEDURE — 3074F SYST BP LT 130 MM HG: CPT | Performed by: INTERNAL MEDICINE

## 2022-12-19 PROCEDURE — 1090F PRES/ABSN URINE INCON ASSESS: CPT | Performed by: INTERNAL MEDICINE

## 2022-12-19 PROCEDURE — 99213 OFFICE O/P EST LOW 20 MIN: CPT | Performed by: INTERNAL MEDICINE

## 2022-12-19 RX ORDER — LOSARTAN POTASSIUM 100 MG/1
100 TABLET ORAL DAILY
Qty: 90 TABLET | Refills: 1 | Status: SHIPPED | OUTPATIENT
Start: 2022-12-19

## 2022-12-19 ASSESSMENT — ENCOUNTER SYMPTOMS
COUGH: 0
TROUBLE SWALLOWING: 0
DIARRHEA: 0
NAUSEA: 0
SHORTNESS OF BREATH: 0
ABDOMINAL PAIN: 0
SORE THROAT: 0
RHINORRHEA: 0

## 2022-12-19 ASSESSMENT — PATIENT HEALTH QUESTIONNAIRE - PHQ9
SUM OF ALL RESPONSES TO PHQ QUESTIONS 1-9: 0
SUM OF ALL RESPONSES TO PHQ QUESTIONS 1-9: 0
2. FEELING DOWN, DEPRESSED OR HOPELESS: 0
9. THOUGHTS THAT YOU WOULD BE BETTER OFF DEAD, OR OF HURTING YOURSELF: 0
10. IF YOU CHECKED OFF ANY PROBLEMS, HOW DIFFICULT HAVE THESE PROBLEMS MADE IT FOR YOU TO DO YOUR WORK, TAKE CARE OF THINGS AT HOME, OR GET ALONG WITH OTHER PEOPLE: 0
SUM OF ALL RESPONSES TO PHQ QUESTIONS 1-9: 0
3. TROUBLE FALLING OR STAYING ASLEEP: 0
1. LITTLE INTEREST OR PLEASURE IN DOING THINGS: 0
4. FEELING TIRED OR HAVING LITTLE ENERGY: 0
SUM OF ALL RESPONSES TO PHQ9 QUESTIONS 1 & 2: 0
8. MOVING OR SPEAKING SO SLOWLY THAT OTHER PEOPLE COULD HAVE NOTICED. OR THE OPPOSITE, BEING SO FIGETY OR RESTLESS THAT YOU HAVE BEEN MOVING AROUND A LOT MORE THAN USUAL: 0
5. POOR APPETITE OR OVEREATING: 0
7. TROUBLE CONCENTRATING ON THINGS, SUCH AS READING THE NEWSPAPER OR WATCHING TELEVISION: 0
SUM OF ALL RESPONSES TO PHQ QUESTIONS 1-9: 0

## 2022-12-19 ASSESSMENT — LIFESTYLE VARIABLES
HOW MANY STANDARD DRINKS CONTAINING ALCOHOL DO YOU HAVE ON A TYPICAL DAY: 1 OR 2
HOW OFTEN DO YOU HAVE A DRINK CONTAINING ALCOHOL: 2-3 TIMES A WEEK

## 2022-12-19 NOTE — PATIENT INSTRUCTIONS
Losartan 100 mg - call back to let me know    Apple cider vinegar ok to try    Shingrix after January at pharmacy    New booster for covid at [address] [facility]     ---  Advance Directives: Care Instructions  Overview  An advance directive is a legal way to state your wishes at the end of your life. It tells your family and your doctor what to do if you can't say what you want. There are two main types of advance directives. You can change them any time your wishes change. Living will. This form tells your family and your doctor your wishes about life support and other treatment. The form is also called a declaration. Medical power of . This form lets you name a person to make treatment decisions for you when you can't speak for yourself. This person is called a health care agent (health care proxy, health care surrogate). The form is also called a durable power of  for health care. If you do not have an advance directive, decisions about your medical care may be made by a family member, or by a doctor or a  who doesn't know you. It may help to think of an advance directive as a gift to the people who care for you. If you have one, they won't have to make tough decisions by themselves. For more information, including forms for your state, see the [address] website (www.caringinfo.org/planning/advance-directives/). Follow-up care is a key part of your treatment and safety. Be sure to make and go to all appointments, and call your doctor if you are having problems. It's also a good idea to know your test results and keep a list of the medicines you take. What should you include in an advance directive? Many states have a unique advance directive form. (It may ask you to address specific issues.) Or you might use a universal form that's approved by many states. If your form doesn't tell you what to address, it may be hard to know what to include in your advance directive. Use the questions below to help you get started. Who do you want to make decisions about your medical care if you are not able to? What life-support measures do you want if you have a serious illness that gets worse over time or can't be cured? What are you most afraid of that might happen? (Maybe you're afraid of having pain, losing your independence, or being kept alive by machines.)  Where would you prefer to die? (Your home? A hospital? A nursing home?)  Do you want to donate your organs when you die? Do you want certain Sikhism practices performed before you die? When should you call for help? Be sure to contact your doctor if you have any questions. Where can you learn more? Go to http://www.callahan.com/ and enter R264 to learn more about \"Advance Directives: Care Instructions. \"  Current as of: June 16, 2022               Content Version: 13.5  © 3563-0156 [employer]. Care instructions adapted under license by [facility] . If you have questions about a medical condition or this instruction, always ask your healthcare professional. [place] [address] any warranty or liability for your use of this information. Personalized Preventive Plan for Grazyna Vaughan - 12/19/2022  Medicare offers a range of preventive health benefits. Some of the tests and screenings are paid in full while other may be subject to a deductible, co-insurance, and/or copay. Some of these benefits include a comprehensive review of your medical history including lifestyle, illnesses that may run in your family, and various assessments and screenings as appropriate. After reviewing your medical record and screening and assessments performed today your provider may have ordered immunizations, labs, imaging, and/or referrals for you. A list of these orders (if applicable) as well as your Preventive Care list are included within your After Visit Summary for your review.     Other Preventive Recommendations:    A preventive eye exam performed by an eye specialist is recommended every 1-2 years to screen for glaucoma; cataracts, macular degeneration, and other eye disorders. A preventive dental visit is recommended every 6 months. Try to get at least 150 minutes of exercise per week or 10,000 steps per day on a pedometer . Order or download the FREE \"Exercise & Physical Activity: Your Everyday Guide\" from [employer]. Call 3-710.165.4509 or search [employer] online. You need 8894-4247 mg of calcium and 3972-5157 IU of vitamin D per day. It is possible to meet your calcium requirement with diet alone, but a vitamin D supplement is usually necessary to meet this goal.  When exposed to the sun, use a sunscreen that protects against both UVA and UVB radiation with an SPF of 30 or greater. Reapply every 2 to 3 hours or after sweating, drying off with a towel, or swimming. Always wear a seat belt when traveling in a car. Always wear a helmet when riding a bicycle or motorcycle.

## 2022-12-19 NOTE — PROGRESS NOTES
Preoperative Consultation      Max Sharp  YOB: 1955    Date of Service:  12/19/2022    Vitals:    12/19/22 1152   BP: 138/82   Pulse: 75   SpO2: 99%   Weight: 195 lb 3.2 oz (88.5 kg)   Height: 5' 9\" (1.753 m)           Chief Complaint   Patient presents with    Pre-op Exam       HPI:    This patient presents to the office today for a preoperative consultation at the request of surgeon, Dr. Chani Davis, who plans on performing right reverse total shoulder replacement on January 9 at [place] [address].        Planned anesthesia: General   Known anesthesia problems: None   Bleeding risk: No recent or remote history of abnormal bleeding  Personal or FH of DVT/PE: No   Recent steroid use: no  Exercise tolerance: greater than 4 METs   Patient objection to receiving blood products: No      Past Medical History:   Diagnosis Date    Abnormal EKG     saw cardio, wnl stress test 3/20    Allergic rhinitis     spring trees    Depression     Endometriosis     Essential hypertension     History of thrombophlebitis     right leg    Hyperlipidemia     Plantar fasciitis     Recurrent sinus infections     Sleep apnea     cpap     Past Surgical History:   Procedure Laterality Date    ANUS SURGERY      anal fissure    COLONOSCOPY  09/2014    recheck 9/19    COLONOSCOPY  04/2019    fu 10 yrs    CYST REMOVAL      right foot    HYSTERECTOMY (CERVIX STATUS UNKNOWN)  2005    JOINT REPLACEMENT Right 2011    PARTIAL HYSTERECTOMY (CERVIX NOT REMOVED)  2006    still w cervix and ovaries    SEPTOPLASTY      SUBTOTAL COLECTOMY  09/2007    perforated colon     TONSILLECTOMY       Family History   Problem Relation Age of Onset    Lung Cancer Mother     Sleep Apnea Mother     Prostate Cancer Father     Prostate Cancer Brother     Breast Cancer Maternal Cousin     Dementia Paternal Aunt      Social History     Tobacco Use    Smoking status: Former     Packs/day: 1.00     Years: 30.00     Pack years: 30.00     Types: Cigarettes     Quit date: 1986     Years since quittin.0    Smokeless tobacco: Never   Substance Use Topics    Alcohol use: Yes    Drug use: Never       Outpatient Medications Marked as Taking for the 22 encounter (Office Visit) with Saige Parada MD   Medication Sig Dispense Refill    losartan (COZAAR) 100 MG tablet Take 1 tablet by mouth daily 90 tablet 1    venlafaxine (EFFEXOR XR) 150 MG extended release capsule TAKE 1 CAPSULE BY MOUTH EVERY DAY 90 capsule 1    [DISCONTINUED] atenolol (TENORMIN) 25 MG tablet TAKE 1 TABLET BY MOUTH EVERY DAY 90 tablet 1    rosuvastatin (CRESTOR) 40 MG tablet TAKE 1 TABLET BY MOUTH EVERY DAY 90 tablet 1    Cholecalciferol 50 MCG (2000 UT) TABS Take 1,000 Units by mouth daily       calcium citrate (CALCITRATE) 250 MG TABS tablet Take 1 tablet by mouth daily 60 tablet 0    Omega-3 Fatty Acids (FISH OIL) 1000 MG CAPS Take 1 capsule by mouth daily      Cyanocobalamin (VITAMIN B12 PO) Take by mouth      Multiple Vitamins-Minerals (MULTIVITAMIN ADULT PO) Take by mouth      vitamin C (ASCORBIC ACID) 500 MG tablet Take 500 mg by mouth daily       Allergies   Allergen Reactions    Cephalexin Rash    Cephalosporins Hives and Shortness Of Breath    Penicillins Anaphylaxis and Rash    Atorvastatin      Muscle weakness    Hazelnut (Filbert) Allergy Skin Test     Lisinopril      Sensation in throat    Peanut-Containing Drug Products        Review of Systems   Constitutional:  Negative for fatigue and fever. HENT:  Negative for rhinorrhea, sore throat and trouble swallowing. Eyes:  Negative for visual disturbance. Respiratory:  Negative for cough and shortness of breath. Cardiovascular:  Negative for chest pain and palpitations. Gastrointestinal:  Negative for abdominal pain, diarrhea and nausea. Genitourinary:  Negative for decreased urine volume, dysuria and frequency. Musculoskeletal:  Positive for arthralgias. Negative for myalgias.    Skin: Negative for rash. Allergic/Immunologic: Negative for immunocompromised state. Neurological:  Negative for dizziness, numbness and headaches. Hematological:  Does not bruise/bleed easily. Psychiatric/Behavioral:  Negative for dysphoric mood and sleep disturbance. The patient is not nervous/anxious. Physical Exam  Vitals and nursing note reviewed. Constitutional:       General: She is not in acute distress. Appearance: She is well-developed. HENT:      Head: Normocephalic and atraumatic. Right Ear: External ear normal.      Left Ear: External ear normal.      Nose: Nose normal.   Eyes:      General: No scleral icterus. Pupils: Pupils are equal, round, and reactive to light. Neck:      Thyroid: No thyromegaly. Cardiovascular:      Rate and Rhythm: Normal rate and regular rhythm. Heart sounds: No murmur heard. Pulmonary:      Effort: No respiratory distress. Breath sounds: Normal breath sounds. No wheezing or rales. Abdominal:      General: Bowel sounds are normal. There is no distension. Palpations: Abdomen is soft. Tenderness: There is no abdominal tenderness. Musculoskeletal:         General: Normal range of motion. Lymphadenopathy:      Cervical: No cervical adenopathy. Skin:     General: Skin is warm and dry. Neurological:      Mental Status: She is alert and oriented to person, place, and time. Cranial Nerves: No cranial nerve deficit. Sensory: No sensory deficit. Coordination: Coordination normal.   Psychiatric:         Behavior: Behavior normal.       132/80     Assessment/Plan:     1. Preop examination  - EKG 12 Lead  - Comprehensive Metabolic Panel; Future  - CBC; Future  - Protime-INR; Future  - APTT;  Future    Pre-Operative Risk assessment using 2014 ACC/AHA guidelines     Emergent procedure NO  Active Cardiac Condition NO (decompensated HF, Arrhythmia, MI <3 weeks, severe valve disease)  Risk Level of Procedure Intermediate Risk (intraperitoneal, intrathoracic, HENT, orthopedic, or carotid endarterectomy, etc.)  Revised Cardiac Risk Index Risk factors:  EKG changes noted from prior  Measurement of Exercise Tolerance before Surgery >4 YES  --  Plan to obtain stress test  ---  12/30 - patient had normal coronary catheterization 12/28. She is cleared for planned surgery. Her blood pressure was elevated during procedure. Advised her to increase Atenolol to 50 mg daily.

## 2022-12-19 NOTE — TELEPHONE ENCOUNTER
Orthopedic Nurse Navigator Summary    Patient Name:  Latonia Chavez  Anticipated Date of Surgery:  01/09/23  Attended Pre-op Education Class:  Video sent to patient email  PCP: Karissa Jones MD  Date of PCP visit for H&P: 12/19/22  Is patient in a Pain Management program:  Review of Medical history reveals history of: HTN, Hyperlipidemia, DEJA on CPAP    Critical Lab Values  - Hemoglobin (g/dL):  Date: 01/04/23 Value 13.4  - Hematocrit(%): Date:  01/04/23  Value 40.7  - HgbA1C:  Date: 11/01/22 Value 5.9  - Albumin:  Date:   Value   - BUN:  Date: 12/28/22  Value 16  - Creatinine:  Date: 12/28/22   Value 0.6    MRSA swab 01/04/23- negative    Coronary Artery Disease/HTN/CHF history: Yes  Cardiologist: No  Cardiac clearance necessary:  No. She is having a stress test 12/22/22 at [facility] [employer] for abnormal wave on EKG   Date of cardiac clearance appt:  Final Cardiac recommendations: On any anticoagulation: Takes fish oil, she will stop this one week prior    Diabetes History:  No  Most recent HgbA1C:  Pulmonary:  COPD/Emphysema/Use of home oxygen: No  Alcohol use: Social    BMI greater than 40 at time of scheduling: Additional medical concerns:   Additional recommendations for above concerns:  Attended Pre-Hab program:    Anticipated Discharge Disposition:  Home with OPT  Who will be with patient at home following discharge:  Her friend Bernadette Fonseca and she has neighbors that will help  Equipment patient already has:  sling  Bedroom on first or second floor:  second- she is going to stay on the main floor and sleep in her recliner  Bathroom on first or second floor:  both  Weight bearing status:  nwb rt arm, otherwise fwb  Pre-op ambulatory status: no issues  Number of entry steps:  2  Caregiver assistance:  full time    Aranza Joseph RN  Date:   12/19/22

## 2022-12-21 ENCOUNTER — TELEPHONE (OUTPATIENT)
Dept: ORTHOPEDIC SURGERY | Age: 67
End: 2022-12-21

## 2022-12-21 NOTE — TELEPHONE ENCOUNTER
Auth: NPR  Date: 01/09/23  Reference # None  Spoke with: None  Type of SX: Outpatient  Location: [facility]  CPT: [id number]   DX: M25.511, M75.101, M12.811  SX area: Rt shoulder  Insurance: Medicare A&B

## 2022-12-22 ENCOUNTER — HOSPITAL ENCOUNTER (OUTPATIENT)
Dept: NON INVASIVE DIAGNOSTICS | Age: 67
Discharge: HOME | End: 2022-12-22
Payer: MEDICARE

## 2022-12-22 ENCOUNTER — TELEPHONE (OUTPATIENT)
Dept: CARDIOLOGY CLINIC | Age: 67
End: 2022-12-22

## 2022-12-22 DIAGNOSIS — R94.31 ABNORMAL EKG: ICD-10-CM

## 2022-12-22 DIAGNOSIS — Z01.818 PREOP EXAMINATION: Primary | ICD-10-CM

## 2022-12-22 PROCEDURE — 3430000000 HC RX DIAGNOSTIC RADIOPHARMACEUTICAL: Performed by: INTERNAL MEDICINE

## 2022-12-22 PROCEDURE — 78452 HT MUSCLE IMAGE SPECT MULT: CPT

## 2022-12-22 PROCEDURE — 93017 CV STRESS TEST TRACING ONLY: CPT

## 2022-12-22 PROCEDURE — A9502 TC99M TETROFOSMIN: HCPCS | Performed by: INTERNAL MEDICINE

## 2022-12-22 PROCEDURE — 6360000002 HC RX W HCPCS: Performed by: INTERNAL MEDICINE

## 2022-12-22 RX ADMIN — REGADENOSON 0.4 MG: 0.08 INJECTION, SOLUTION INTRAVENOUS at 10:40

## 2022-12-22 RX ADMIN — TETROFOSMIN 10 MILLICURIE: 1.38 INJECTION, POWDER, LYOPHILIZED, FOR SOLUTION INTRAVENOUS at 08:30

## 2022-12-22 RX ADMIN — TETROFOSMIN 30 MILLICURIE: 1.38 INJECTION, POWDER, LYOPHILIZED, FOR SOLUTION INTRAVENOUS at 10:40

## 2022-12-22 NOTE — TELEPHONE ENCOUNTER
Tierney Guevara would like pt set up For [facility] on Wednesday 12/28/2022      Left Heart Catheterization    A left heart catheterization is a procedure that provides your cardiologist with detailed information regarding how your heart functions. A small catheter (long, fine tube) is inserted into an artery (a vessel that carries blood and oxygen) that leads to your heart. While watching with x-ray equipment, small amounts of dye are injected which enables visualization of the heart arteries and chambers. The pictures that your cardiologist receives from the cardiac catheterization enable him or her to decide on the best treatment for you. Date of the procedure:       Time of arrival:      Cardiologist performing the procedure: Instructions for your left heart catheterization:    1. Bring a list of your medications to the hospital.    2.  Please notify us before the procedure if you are allergic to anything; especially x-ray contrast dye, iodine, nickel, or any type of jewelry. This is very important! 3. Do not eat or drink anything at all after midnight (or 8 hours) prior to the procedure. 4.  Take all morning medications EXCEPT any diuretics (water pills) the day of the procedure with a small sip of water. 5.  If you are on Coumadin, Warfarin, or Ela Hug, please notify us so that we can make adjustments to your medication. 6.  If you are taking Xarelto, Eliquis, or Pradaxa, please stop staking these medications two days prior to the procedure (including the day of the procedure). 7.  If you are diabetic, check your blood sugar in the morning. If your blood sugar is 120 or less, do not take insulin. If your blood sugar is more than 120, take half the dose of your normal insulin. Do not take Metformin the night before your procedure or morning of the procedure. 8.  You MUST have someone to drive you home--no driving for 24 hours after your procedure.   If an intervention is performed, you might stay overnight in the hospital.    9.  Discharge instructions will be given to you at the time of your procedure. 10.  For any questions or if you cannot keep this appointment for any reason, please call [phone].  [address] [facility]

## 2022-12-23 NOTE — TELEPHONE ENCOUNTER
Attempted to reach patient but her phone voicemail is full. If she calls back she is scheduled for her [facility] (details below). She will need someone to go over pre-procedure instructions if she calls back from the missed call. Left Heart Catheterization     A left heart catheterization is a procedure that provides your cardiologist with detailed information regarding how your heart functions. A small catheter (long, fine tube) is inserted into an artery (a vessel that carries blood and oxygen) that leads to your heart. While watching with x-ray equipment, small amounts of dye are injected which enables visualization of the heart arteries and chambers. The pictures that your cardiologist receives from the cardiac catheterization enable him or her to decide on the best treatment for you. Date of the procedure:   12/28/2022     Time of arrival:  6:30 am  Procedure time: 8:00 am      Cardiologist performing the procedure: Instructions for your left heart catheterization:     1. Bring a list of your medications to the hospital.     2.  Please notify us before the procedure if you are allergic to anything; especially x-ray contrast dye, iodine, nickel, or any type of jewelry. This is very important! 3. Do not eat or drink anything at all after midnight (or 8 hours) prior to the procedure. 4.  Take all morning medications EXCEPT any diuretics (water pills) the day of the procedure with a small sip of water. 5.  If you are on Coumadin, Warfarin, or Marilee Bourgeois, please notify us so that we can make adjustments to your medication. 6.  If you are taking Xarelto, Eliquis, or Pradaxa, please stop staking these medications two days prior to the procedure (including the day of the procedure). 7.  If you are diabetic, check your blood sugar in the morning. If your blood sugar is 120 or less, do not take insulin. If your blood sugar is more than 120, take half the dose of your normal insulin.   Do not take Metformin the night before your procedure or morning of the procedure. 8.  You MUST have someone to drive you home--no driving for 24 hours after your procedure. If an intervention is performed, you might stay overnight in the hospital.     9.  Discharge instructions will be given to you at the time of your procedure. 10.  For any questions or if you cannot keep this appointment for any reason, please call [phone].     Teams updated / alerted cath lab

## 2022-12-27 ENCOUNTER — OFFICE VISIT (OUTPATIENT)
Dept: CARDIOLOGY CLINIC | Age: 67
End: 2022-12-27
Payer: MEDICARE

## 2022-12-27 ENCOUNTER — PREP FOR PROCEDURE (OUTPATIENT)
Dept: CARDIOLOGY CLINIC | Age: 67
End: 2022-12-27

## 2022-12-27 VITALS
HEART RATE: 76 BPM | WEIGHT: 206 LBS | DIASTOLIC BLOOD PRESSURE: 80 MMHG | SYSTOLIC BLOOD PRESSURE: 130 MMHG | BODY MASS INDEX: 30.42 KG/M2

## 2022-12-27 DIAGNOSIS — I10 ESSENTIAL (PRIMARY) HYPERTENSION: ICD-10-CM

## 2022-12-27 DIAGNOSIS — Z01.818 PRE-OP EVALUATION: Primary | ICD-10-CM

## 2022-12-27 DIAGNOSIS — R94.39 ABNORMAL MYOCARDIAL PERFUSION STUDY: ICD-10-CM

## 2022-12-27 PROCEDURE — 3017F COLORECTAL CA SCREEN DOC REV: CPT | Performed by: INTERNAL MEDICINE

## 2022-12-27 PROCEDURE — 1090F PRES/ABSN URINE INCON ASSESS: CPT | Performed by: INTERNAL MEDICINE

## 2022-12-27 PROCEDURE — 1123F ACP DISCUSS/DSCN MKR DOCD: CPT | Performed by: INTERNAL MEDICINE

## 2022-12-27 PROCEDURE — 3078F DIAST BP <80 MM HG: CPT | Performed by: INTERNAL MEDICINE

## 2022-12-27 PROCEDURE — G8399 PT W/DXA RESULTS DOCUMENT: HCPCS | Performed by: INTERNAL MEDICINE

## 2022-12-27 PROCEDURE — G8428 CUR MEDS NOT DOCUMENT: HCPCS | Performed by: INTERNAL MEDICINE

## 2022-12-27 PROCEDURE — 3074F SYST BP LT 130 MM HG: CPT | Performed by: INTERNAL MEDICINE

## 2022-12-27 PROCEDURE — G8484 FLU IMMUNIZE NO ADMIN: HCPCS | Performed by: INTERNAL MEDICINE

## 2022-12-27 PROCEDURE — 99204 OFFICE O/P NEW MOD 45 MIN: CPT | Performed by: INTERNAL MEDICINE

## 2022-12-27 PROCEDURE — 1036F TOBACCO NON-USER: CPT | Performed by: INTERNAL MEDICINE

## 2022-12-27 PROCEDURE — G8417 CALC BMI ABV UP PARAM F/U: HCPCS | Performed by: INTERNAL MEDICINE

## 2022-12-27 RX ORDER — LOSARTAN POTASSIUM 100 MG/1
TABLET ORAL
Qty: 90 TABLET | Refills: 1 | OUTPATIENT
Start: 2022-12-27

## 2022-12-27 ASSESSMENT — ENCOUNTER SYMPTOMS
CHEST TIGHTNESS: 0
SHORTNESS OF BREATH: 0

## 2022-12-27 NOTE — PROGRESS NOTES
Subjective:      Patient ID: Hilario Palomo is a 75 y.o. female. HPI  Referred back pre op/abn gita. No complaints. Walks daily. No exertional sob/chest pain. No pnd or orthopnea. No palp/tachy/syncope. Wt stable.       Past Medical History:   Diagnosis Date    Abnormal EKG     saw cardio, wnl stress test 3/20    Allergic rhinitis     spring trees    Depression     Endometriosis     Essential hypertension     History of thrombophlebitis     right leg    Hyperlipidemia     Plantar fasciitis     Recurrent sinus infections     Sleep apnea     cpap     Past Surgical History:   Procedure Laterality Date    ANUS SURGERY      anal fissure    COLONOSCOPY  2014    recheck     COLONOSCOPY  2019    fu 10 yrs    CYST REMOVAL      right foot    HYSTERECTOMY (CERVIX STATUS UNKNOWN)  2005    JOINT REPLACEMENT Right 2011    PARTIAL HYSTERECTOMY (CERVIX NOT REMOVED)      still w cervix and ovaries    SEPTOPLASTY      SUBTOTAL COLECTOMY  2007    perforated colon     TONSILLECTOMY       Social History     Socioeconomic History    Marital status: Single     Spouse name: Not on file    Number of children: 0    Years of education: Not on file    Highest education level: Not on file   Occupational History    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     Comment: [employer]   Tobacco Use    Smoking status: Former     Packs/day: 1.00     Years: 30.00     Pack years: 30.00     Types: Cigarettes     Quit date: 1986     Years since quittin.9    Smokeless tobacco: Never   Substance and Sexual Activity    Alcohol use: Yes    Drug use: Never    Sexual activity: Not Currently   Other Topics Concern    Not on file   Social History Narrative    Lives alone with dog (fayewebogdan)     Social Determinants of Health     Financial Resource Strain: Low Risk     Difficulty of Paying Living Expenses: Not hard at all   Food Insecurity: No Food Insecurity    Worried About Running Out of Food in the Last Year: Never true    [address] in the Last Year: Never true   Transportation Needs: Not on file   Physical Activity: Sufficiently Active    Days of Exercise per Week: 6 days    Minutes of Exercise per Session: 40 min   Stress: Not on file   Social Connections: Not on file   Intimate Partner Violence: Not on file   Housing Stability: Not on file     FH reviewed, Fa with stents 80s. Vitals:    12/27/22 1019   BP: 130/80   Pulse: 76     Wt 206    Review of Systems   Constitutional:  Negative for activity change and appetite change. Respiratory:  Negative for chest tightness and shortness of breath. Cardiovascular:  Negative for chest pain, palpitations and leg swelling. Denies PND or orthopnea. No tachycardia or syncope. Neurological:  Negative for dizziness and syncope. Psychiatric/Behavioral:  Negative for behavioral problems and confusion. All other systems reviewed and are negative. Objective:   Physical Exam  Constitutional:       General: She is not in acute distress. Appearance: Normal appearance. She is well-developed. HENT:      Head: Normocephalic. Right Ear: External ear normal.      Left Ear: External ear normal.      Nose: Nose normal.   Neck:      Vascular: No JVD. Cardiovascular:      Rate and Rhythm: Normal rate and regular rhythm. Heart sounds: Normal heart sounds. No murmur heard. No gallop. Pulmonary:      Effort: Pulmonary effort is normal. No respiratory distress. Breath sounds: Normal breath sounds. No wheezing or rales. Abdominal:      General: Bowel sounds are normal.      Palpations: Abdomen is soft. Tenderness: There is no abdominal tenderness. Musculoskeletal:         General: Normal range of motion. Cervical back: Normal range of motion. Skin:     General: Skin is warm and dry. Neurological:      General: No focal deficit present. Mental Status: She is alert and oriented to person, place, and time.    Psychiatric:         Mood and Affect: Mood normal. Behavior: Behavior normal.       Assessment:       Diagnosis Orders   1. Pre-op evaluation        2. Abnormal myocardial perfusion study                Plan:      CV appears stable. No angina. No evidence CHF. EKG shows NSR,  ant MI AU. Myoview reviewed showing small area of reversible defect anteroseptal, intermediat risk scan. Have recommended cath in view of abn gita, risk factors and up coming surgery. Risk and bene esplained. ASA.          Shilpa Austin MD

## 2022-12-27 NOTE — TELEPHONE ENCOUNTER
Please confirm with pharmacy that the prescription should be Losartan 100 mg- take one daily which is what I sent on 12/19. There was an error on the prior instructions so patient may have run out early.

## 2022-12-28 ENCOUNTER — HOSPITAL ENCOUNTER (OUTPATIENT)
Dept: CARDIAC CATH/INVASIVE PROCEDURES | Age: 67
Discharge: HOME | End: 2022-12-30
Payer: MEDICARE

## 2022-12-28 ENCOUNTER — TELEPHONE (OUTPATIENT)
Dept: INTERNAL MEDICINE CLINIC | Age: 67
End: 2022-12-28

## 2022-12-28 VITALS — HEIGHT: 69 IN | TEMPERATURE: 98.2 F | BODY MASS INDEX: 28.88 KG/M2 | WEIGHT: 195 LBS

## 2022-12-28 DIAGNOSIS — R94.39 ABNORMAL MYOCARDIAL PERFUSION STUDY: ICD-10-CM

## 2022-12-28 LAB
ANION GAP SERPL CALCULATED.3IONS-SCNC: 12 MMOL/L (ref 3–16)
BUN BLDV-MCNC: 16 MG/DL (ref 7–20)
CALCIUM SERPL-MCNC: 9.5 MG/DL (ref 8.3–10.6)
CHLORIDE BLD-SCNC: 103 MMOL/L (ref 99–110)
CO2: 24 MMOL/L (ref 21–32)
CREAT SERPL-MCNC: 0.6 MG/DL (ref 0.6–1.2)
EKG ATRIAL RATE: 70 BPM
EKG DIAGNOSIS: NORMAL
EKG P AXIS: 20 DEGREES
EKG P-R INTERVAL: 204 MS
EKG Q-T INTERVAL: 398 MS
EKG QRS DURATION: 98 MS
EKG QTC CALCULATION (BAZETT): 429 MS
EKG R AXIS: -1 DEGREES
EKG T AXIS: 60 DEGREES
EKG VENTRICULAR RATE: 70 BPM
GFR SERPL CREATININE-BSD FRML MDRD: >60 ML/MIN/{1.73_M2}
GLUCOSE BLD-MCNC: 122 MG/DL (ref 70–99)
HCT VFR BLD CALC: 38.1 % (ref 36–48)
HEMOGLOBIN: 13.1 G/DL (ref 12–16)
INR BLD: 0.88 (ref 0.87–1.14)
LEFT VENTRICULAR EJECTION FRACTION MODE: NORMAL
LV EF: 60 %
MCH RBC QN AUTO: 31 PG (ref 26–34)
MCHC RBC AUTO-ENTMCNC: 34.2 G/DL (ref 31–36)
MCV RBC AUTO: 90.6 FL (ref 80–100)
PDW BLD-RTO: 13.6 % (ref 12.4–15.4)
PLATELET # BLD: 175 K/UL (ref 135–450)
PMV BLD AUTO: 7 FL (ref 5–10.5)
POTASSIUM SERPL-SCNC: 4.4 MMOL/L (ref 3.5–5.1)
PROTHROMBIN TIME: 11.9 SEC (ref 11.7–14.5)
RBC # BLD: 4.21 M/UL (ref 4–5.2)
SODIUM BLD-SCNC: 139 MMOL/L (ref 136–145)
WBC # BLD: 4.4 K/UL (ref 4–11)

## 2022-12-28 PROCEDURE — 2500000003 HC RX 250 WO HCPCS

## 2022-12-28 PROCEDURE — 2709999900 HC NON-CHARGEABLE SUPPLY

## 2022-12-28 PROCEDURE — 85610 PROTHROMBIN TIME: CPT

## 2022-12-28 PROCEDURE — C1769 GUIDE WIRE: HCPCS

## 2022-12-28 PROCEDURE — 99152 MOD SED SAME PHYS/QHP 5/>YRS: CPT

## 2022-12-28 PROCEDURE — C1894 INTRO/SHEATH, NON-LASER: HCPCS

## 2022-12-28 PROCEDURE — 93005 ELECTROCARDIOGRAM TRACING: CPT | Performed by: INTERNAL MEDICINE

## 2022-12-28 PROCEDURE — 80048 BASIC METABOLIC PNL TOTAL CA: CPT

## 2022-12-28 PROCEDURE — 93458 L HRT ARTERY/VENTRICLE ANGIO: CPT

## 2022-12-28 PROCEDURE — 6360000002 HC RX W HCPCS

## 2022-12-28 PROCEDURE — 99153 MOD SED SAME PHYS/QHP EA: CPT

## 2022-12-28 PROCEDURE — 85027 COMPLETE CBC AUTOMATED: CPT

## 2022-12-28 PROCEDURE — 6360000004 HC RX CONTRAST MEDICATION: Performed by: INTERNAL MEDICINE

## 2022-12-28 RX ORDER — ACETAMINOPHEN 325 MG/1
650 TABLET ORAL EVERY 4 HOURS PRN
Status: DISCONTINUED | OUTPATIENT
Start: 2022-12-28 | End: 2022-12-31 | Stop reason: HOSPADM

## 2022-12-28 RX ORDER — SODIUM CHLORIDE 9 MG/ML
INJECTION, SOLUTION INTRAVENOUS CONTINUOUS
Status: ACTIVE | OUTPATIENT
Start: 2022-12-28 | End: 2022-12-28

## 2022-12-28 RX ADMIN — IOHEXOL 100 ML: 350 INJECTION, SOLUTION INTRAVENOUS at 08:32

## 2022-12-28 NOTE — TELEPHONE ENCOUNTER
LVM for the pt to be made aware that she will need to have more labs to complete prior to her surgery date and time     The pt will need to completed some labs, leave a urine sample and do a MRSA swab

## 2022-12-28 NOTE — CONSULTS
Procedure: LHC  Complication: none  EBL< 5cc  Preliminary:  LV uniform. EF 60%. Essentially normal cors.

## 2022-12-28 NOTE — CONSULTS
Brief Pre-Op Note/Sedation Assessment      Peter Diggs  1955  [id number]  8:06 AM    Planned Procedure: Cardiac Catheterization Procedure  Post Procedure Plan: Return to same level of care  Consent: I have discussed with the patient and/or the patient representative the indication, alternatives, and the possible risks and/or complications of the planned procedure and the anesthesia methods. The patient and/or patient representative appear to understand and agree to proceed. Chief Complaint:   Pre-Op Clearance      Indications for Cath Procedure:  Presentation:  Pre-Operative Evaluation - Surgery Type: Non-Cardiac Surgery, Functional Capacity: Unknown, Surgical Risk: Intermediate, Solid Organ Transplant Surgery:  Yes  2. Anginal Classification within 2 weeks:  No symptoms  3. Angina Symptoms Assessment:  Asymptomatic  4. Heart Failure Class within last 2 weeks:  No symptoms  5. Cardiovascular Instability:  No    Prior Ischemic Workup/Eval:  Pre-Procedural Medications: Yes: Aspirin  2. Stress Test Completed? Yes:  Stress or Imaging Studies Performed (within ANY time period):   Type:  Stress Nuclear  Results:  Positive:  Myocardial Perfusion Defects (Nuclear) Extent of Ischemia:  Intermediate    Does Patient need surgery? Cath Valve Surgery:  No    Pre-Procedure Medical History:  Vital Signs:  Temp 98.2 °F (36.8 °C) (Oral)   Ht 5' 9\" (1.753 m)   Wt 195 lb (88.5 kg)   BMI 28.80 kg/m²     Allergies:     Allergies   Allergen Reactions    Cephalexin Rash    Cephalosporins Hives and Shortness Of Breath    Penicillins Anaphylaxis and Rash    Atorvastatin      Muscle weakness    Hazelnut (Filbert) Allergy Skin Test     Lisinopril      Sensation in throat    Peanut-Containing Drug Products      Medications:    Current Outpatient Medications   Medication Sig Dispense Refill    losartan (COZAAR) 100 MG tablet Take 1 tablet by mouth daily 90 tablet 1    venlafaxine (EFFEXOR XR) 150 MG extended release capsule TAKE 1 CAPSULE BY MOUTH EVERY DAY 90 capsule 1    atenolol (TENORMIN) 25 MG tablet TAKE 1 TABLET BY MOUTH EVERY DAY 90 tablet 1    rosuvastatin (CRESTOR) 40 MG tablet TAKE 1 TABLET BY MOUTH EVERY DAY 90 tablet 1    Cholecalciferol 50 MCG (2000 UT) TABS Take 1,000 Units by mouth daily       calcium citrate (CALCITRATE) 250 MG TABS tablet Take 1 tablet by mouth daily 60 tablet 0    Omega-3 Fatty Acids (FISH OIL) 1000 MG CAPS Take 1 capsule by mouth daily      Cyanocobalamin (VITAMIN B12 PO) Take by mouth      Multiple Vitamins-Minerals (MULTIVITAMIN ADULT PO) Take by mouth      vitamin C (ASCORBIC ACID) 500 MG tablet Take 500 mg by mouth daily       Current Facility-Administered Medications   Medication Dose Route Frequency Provider Last Rate Last Admin    iohexol (OMNIPAQUE 350) solution 100 mL  100 mL IntraVENous Once Antonia Murguia MD           Past Medical History:    Past Medical History:   Diagnosis Date    Abnormal EKG     saw cardio, wnl stress test 3/20    Allergic rhinitis     spring trees    Depression     Endometriosis     Essential hypertension     History of thrombophlebitis     right leg    Hyperlipidemia     Plantar fasciitis     Recurrent sinus infections     Sleep apnea     cpap       Surgical History:    Past Surgical History:   Procedure Laterality Date    ANUS SURGERY      anal fissure    COLONOSCOPY  09/2014    recheck 9/19    COLONOSCOPY  04/2019    fu 10 yrs    CYST REMOVAL      right foot    HYSTERECTOMY (CERVIX STATUS UNKNOWN)  2005    JOINT REPLACEMENT Right 2011    PARTIAL HYSTERECTOMY (CERVIX NOT REMOVED)  2006    still w cervix and ovaries    SEPTOPLASTY      SUBTOTAL COLECTOMY  09/2007    perforated colon     TONSILLECTOMY               Pre-Sedation:  Pre-Sedation Documentation and Exam:  I have personally completed a history, physical exam & review of systems for this patient (see notes).     Prior History of Anesthesia Complications:   none    Modified Mallampati:  II (soft palate, uvula, fauces visible)    ASA Classification:  Class 3 - A patient with severe systemic disease that limits activity but is not incapacitating    Rayo Scale: Activity:  2 - Able to move 4 extremities voluntarily on command  Respiration:  2 - Able to breathe deeply and cough freely  Circulation:  2 - BP+/- 20mmHg of normal  Consciousness:  2 - Fully awake  Oxygen Saturation (color):  2 - Able to maintain oxygen saturation >92% on room air    Sedation/Anesthesia Plan:  Guard the patient's safety and welfare. Minimize physical discomfort and pain. Minimize negative psychological responses to treatment by providing sedation and analgesia and maximize the potential amnesia. Patient to meet pre-procedure discharge plan.     Medication Planned:  midazolam intravenously and fentanyl intravenously    Patient is an appropriate candidate for plan of sedation:   yes      Electronically signed by Maira Orlando MD on 12/28/2022 at 8:06 AM

## 2022-12-28 NOTE — PROCEDURES
[facility] [address], [address]                            CARDIAC CATHETERIZATION    PATIENT NAME: Tae Campos                       :        1955  MED REC NO:   [id number]                          ROOM:  ACCOUNT NO:   [de-identified]                           ADMIT DATE: 2022  PROVIDER:     Justino Sommers MD    DATE OF PROCEDURE:  2022    PROCEDURES PERFORMED:  Left heart catheterization and coronary  cineangiography. HISTORY:  The patient is a 68-year-old female whom we were asked to  evaluate preoperatively for an abnormal Myoview. She is to undergo  shoulder surgery consisting with shoulder replacement. She underwent  preop Myoview stress testing, which demonstrated a small-to-moderate  size defect in the anteroseptal wall, which was reversible consistent  with ischemia. Her wall motion was normal and ejection fraction was  normal.  Because of her abnormal stress test as well as her upcoming  surgical procedure, it was felt that she should undergo catheterization. TECHNICAL PROCEDURE:  The patient was brought to the cardiac  catheterization lab on 2022, where the right femoral area was  prepped and draped in the usual sterile fashion. After anesthetizing  the area with 2% lidocaine, a 5-Hungarian sheath was placed in the right  femoral artery using Seldinger technique. Subsequently, left heart  catheterization, left ventriculography, and selective coronary  cineangiography of both left and right coronaries were performed in  multiple projections. This was performed using a 5-Hungarian pigtail JL4  and JR4 diagnostic catheters. The patient tolerated the procedure well. No complications were encountered. The right femoral arterial sheath  was removed and hemostasis was obtained using manual pressure. No  complication were encountered.     RESULTS:  HEMODYNAMICS:  Left ventricular end-diastolic pressure equals 20. There was no significant gradient across the aortic valve by pullback  post cineangiography. LEFT VENTRICULOGRAM:  Left ventriculogram demonstrates uniform wall  motion. Estimated ejection fraction is 60%. LEFT MAIN:  Left main was a very short vessel that gave almost immediate  rise to the LAD and circumflex. The left main was normal.    LEFT ANTERIOR DESCENDING:  The LAD courses to and wraps around the apex. It gave off moderate to large first diagonal branch followed by two  distal diagonal branches. The LAD was essentially normal.    LEFT CIRCUMFLEX:  Left circumflex consists essentially of a bifurcating  marginal branch. Circumflex is normal.    RIGHT CORONARY ARTERY:  Right coronary artery is a dominant vessel. It  gives off moderate distribution high rising PDA and three distal  posterolateral branches. Right coronary artery is normal.    IMPRESSION:  1. Normal left ventricular systolic function. 2.  Essentially normal coronary arteries.         Abiola Crum MD    D: 12/28/2022 8:38:12       T: 12/28/2022 9:19:07     [email or web address]  Job#: [id number]     Doc#: [id number]    CC:

## 2022-12-28 NOTE — CONSULTS
Subjective:      Patient ID: Heather Colin is a 75 y.o. female. HPI  Referred back pre op/abn gita. No complaints. Walks daily. No exertional sob/chest pain. No pnd or orthopnea. No palp/tachy/syncope. Wt stable.             Past Medical History:   Diagnosis Date    Abnormal EKG       saw cardio, wnl stress test 3/20    Allergic rhinitis       spring trees    Depression      Endometriosis      Essential hypertension      History of thrombophlebitis       right leg    Hyperlipidemia      Plantar fasciitis      Recurrent sinus infections      Sleep apnea       cpap            Past Surgical History:   Procedure Laterality Date    ANUS SURGERY         anal fissure    COLONOSCOPY   2014     recheck     COLONOSCOPY   2019     fu 10 yrs    CYST REMOVAL         right foot    HYSTERECTOMY (CERVIX STATUS UNKNOWN)   2005    JOINT REPLACEMENT Right 2011    PARTIAL HYSTERECTOMY (CERVIX NOT REMOVED)        still w cervix and ovaries    SEPTOPLASTY        SUBTOTAL COLECTOMY   2007     perforated colon     TONSILLECTOMY          Social History            Socioeconomic History    Marital status: Single       Spouse name: Not on file    Number of children: 0    Years of education: Not on file    Highest education level: Not on file   Occupational History    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       Comment: [employer]   Tobacco Use    Smoking status: Former       Packs/day: 1.00       Years: 30.00       Pack years: 30.00       Types: Cigarettes       Quit date: 1986       Years since quittin.9    Smokeless tobacco: Never   Substance and Sexual Activity    Alcohol use: Yes    Drug use: Never    Sexual activity: Not Currently   Other Topics Concern    Not on file   Social History Narrative     Lives alone with dog (fayewebogdan)      Social Determinants of Health          Financial Resource Strain: Low Risk     Difficulty of Paying Living Expenses: Not hard at all   Food Insecurity: No Food Insecurity    Worried About Running Out of Food in the Last Year: Never true    Ran Out of Food in the Last Year: Never true   Transportation Needs: Not on file   Physical Activity: Sufficiently Active    Days of Exercise per Week: 6 days    Minutes of Exercise per Session: 40 min   Stress: Not on file   Social Connections: Not on file   Intimate Partner Violence: Not on file   Housing Stability: Not on file      FH reviewed, Fa with stents 80s. Vitals:     12/27/22 1019   BP: 130/80   Pulse: 76      Wt 206     Review of Systems   Constitutional:  Negative for activity change and appetite change. Respiratory:  Negative for chest tightness and shortness of breath. Cardiovascular:  Negative for chest pain, palpitations and leg swelling. Denies PND or orthopnea. No tachycardia or syncope. Neurological:  Negative for dizziness and syncope. Psychiatric/Behavioral:  Negative for behavioral problems and confusion. All other systems reviewed and are negative. Objective:   Physical Exam  Constitutional:       General: She is not in acute distress. Appearance: Normal appearance. She is well-developed. HENT:      Head: Normocephalic. Right Ear: External ear normal.      Left Ear: External ear normal.      Nose: Nose normal.   Neck:      Vascular: No JVD. Cardiovascular:      Rate and Rhythm: Normal rate and regular rhythm. Heart sounds: Normal heart sounds. No murmur heard. No gallop. Pulmonary:      Effort: Pulmonary effort is normal. No respiratory distress. Breath sounds: Normal breath sounds. No wheezing or rales. Abdominal:      General: Bowel sounds are normal.      Palpations: Abdomen is soft. Tenderness: There is no abdominal tenderness. Musculoskeletal:         General: Normal range of motion. Cervical back: Normal range of motion. Skin:     General: Skin is warm and dry. Neurological:      General: No focal deficit present.       Mental Status: She is alert and oriented to person, place, and time. Psychiatric:         Mood and Affect: Mood normal.         Behavior: Behavior normal.         Assessment:     Diagnosis Orders   1. Pre-op evaluation          2. Abnormal myocardial perfusion study                              Plan:   CV appears stable. No angina. No evidence CHF. EKG shows NSR,  ant MI AU. Myoview reviewed showing small area of reversible defect anteroseptal, intermediat risk scan. Have recommended cath in view of abn gita, risk factors and up coming surgery. Risk and bene esplained. ASA.                     Alexey Pittman MD

## 2022-12-28 NOTE — DISCHARGE INSTRUCTIONS
[facility]  Cardiovascular Special Procedures  Angiogram/Cardiac Catheterization  Patient Discharge Instructions           Day 1 (Procedure Day):  Rest for the remainder of the day. Minimal stair climbing, if you must use stairs, lead with your unaffected leg. Do not drive a car. Do not be alone. Avoid prolonged sitting, walk around occasionally until bedtime tonight. Leave dressing intact. Day 2:  You may climb stairs, begin slowly  You may drive a car, unless otherwise directed by physician. Remove dressing. You may bathe/shower, but wash gently around the puncture site and pat dry. Place new band-aid on site daily until skin heals. Things to Avoid:  You may not do any strenuous exercises for one week. (ex: golfing, bowling, tennis, vacuum, snow removal, jogging, and aerobic exercises). No hot tubs, baths, or pools for one week. Do not lift anything over 10 pounds for 10 days. Avoid positions that would put pressure on your groin. Like sitting upright in a straight back chair. No lotions, powders, or ointments near site for 5 days. Things to watch for:  You may have a small lump or a bruise. This is common and will go away. If the lump increases and site becomes painful, call physician immediately. If mild discomfort occurs at the puncture site you may place an ice pack on the site at 15 minute intervals. If the puncture site starts to bleed, immediately lie on a hard flat surface and apply pressure just above the puncture site. Have someone call 911 and maintain pressure until the EMS arrives. If you have loss of color, extreme coldness or numbness of the leg, call 911 immediately. Excessive pain of the groin, thigh or calf, call your physician immediately. Watch for signs and symptoms of an infection at the groin site (fever, local pain, redness, warmth or discharge from the puncture site), call your physician immediately if any develop.       Any Questions please call us at [phone] (between 7am- 5pm, Mon-Fri). After hours you should contact your physician. [facility]  Cardiovascular Special Procedures  General Discharge Instructions    PROCEDURE: Left Heart Catheterization    _X___ You may be drowsy or lightheaded after receiving sedation. DO NOT operate a vehicle (automobile, bicycle, motorcycle, machinery, or power tools), make any important decisions or sign any important/legal documents, or drink alcoholic beverages for the next 24 hours  __X__ We strongly suggest that a responsible adult be with you for the next 24 hours for your protection and safety  __X__ If the intravenous catheter site is painful, apply warm wet compresses on the site until the soreness is relieved and elevate the arm above the heart. Call your physician if no improvement in 2 to 3 days    DIETARY INSTRUCTIONS:    ____ Drink extra fluids over the next 24 hours (If not contraindicated by illness or by physician order)  ____ Start with clear liquids and progress to normal diet as you feel like eating. If you experience nausea or repeated episodes of vomiting, which persist beyond 12-24 hours, notify your doctor        __X__ Resume your previous diet      MEDICATION INSTRUCTIONS:    ____ See Medication Reconciliation Sheet      SPECIAL INSTRUCTIONS:  ________________________________________________________________________________________________________________________________________________________________________________________________________________________                                                                                                                                                                                                                                                                                                Please make sure that you follow-up with your doctors office for your results.       FOLLOW-UP APPOINTMENT    Follow up with NP in 1 week.  .  Follow up with MD as directed. Belongings returned to patient and/or family: YES. The Discharge Instructions have been explained to me. I understand and can verbalize these instructions.

## 2022-12-29 NOTE — TELEPHONE ENCOUNTER
Patient called back and I read her Sancho's message. She would like Sancho to call her back to  further discuss because she had a heart cath done at [facility] yesterday and she is just resting today. She is wanting to see if maybe she could do the labs over 2 periods because she is pretty bruised up from yesterday. Please call her to discuss she will have her phone on and with her.

## 2022-12-30 RX ORDER — ATENOLOL 50 MG/1
50 TABLET ORAL DAILY
Qty: 30 TABLET | Refills: 2 | Status: SHIPPED | OUTPATIENT
Start: 2022-12-30

## 2022-12-30 NOTE — TELEPHONE ENCOUNTER
Patient aware to get remaining labs, urine culture and mrsa swab. Will need nurse visit for that. Informed Dr. Theo Simmons to have his staff check those results since I will be out next week. Otherwise she is cleared for surgery.

## 2023-01-04 ENCOUNTER — NURSE ONLY (OUTPATIENT)
Dept: INTERNAL MEDICINE CLINIC | Age: 68
End: 2023-01-04

## 2023-01-04 DIAGNOSIS — Z01.818 PREOP EXAMINATION: ICD-10-CM

## 2023-01-04 LAB
BASOPHILS ABSOLUTE: 0 K/UL (ref 0–0.2)
BASOPHILS RELATIVE PERCENT: 0.8 %
C-REACTIVE PROTEIN: <3 MG/L (ref 0–5.1)
EOSINOPHILS ABSOLUTE: 0.1 K/UL (ref 0–0.6)
EOSINOPHILS RELATIVE PERCENT: 2 %
HCT VFR BLD CALC: 40.7 % (ref 36–48)
HEMOGLOBIN: 13.4 G/DL (ref 12–16)
LYMPHOCYTES ABSOLUTE: 1.6 K/UL (ref 1–5.1)
LYMPHOCYTES RELATIVE PERCENT: 31.7 %
MCH RBC QN AUTO: 30.2 PG (ref 26–34)
MCHC RBC AUTO-ENTMCNC: 33 G/DL (ref 31–36)
MCV RBC AUTO: 91.7 FL (ref 80–100)
MONOCYTES ABSOLUTE: 0.5 K/UL (ref 0–1.3)
MONOCYTES RELATIVE PERCENT: 9.6 %
NEUTROPHILS ABSOLUTE: 2.9 K/UL (ref 1.7–7.7)
NEUTROPHILS RELATIVE PERCENT: 55.9 %
PDW BLD-RTO: 13.3 % (ref 12.4–15.4)
PLATELET # BLD: 199 K/UL (ref 135–450)
PMV BLD AUTO: 8.2 FL (ref 5–10.5)
RBC # BLD: 4.44 M/UL (ref 4–5.2)
WBC # BLD: 5.1 K/UL (ref 4–11)

## 2023-01-05 ENCOUNTER — OFFICE VISIT (OUTPATIENT)
Dept: CARDIOLOGY CLINIC | Age: 68
End: 2023-01-05
Payer: MEDICARE

## 2023-01-05 VITALS
HEART RATE: 68 BPM | WEIGHT: 198.6 LBS | SYSTOLIC BLOOD PRESSURE: 140 MMHG | BODY MASS INDEX: 29.33 KG/M2 | DIASTOLIC BLOOD PRESSURE: 96 MMHG

## 2023-01-05 DIAGNOSIS — I10 ESSENTIAL HYPERTENSION: Primary | ICD-10-CM

## 2023-01-05 DIAGNOSIS — Z01.818 PRE-OP EVALUATION: ICD-10-CM

## 2023-01-05 DIAGNOSIS — E78.2 MIXED HYPERLIPIDEMIA: ICD-10-CM

## 2023-01-05 LAB
MRSA SCREEN RT-PCR: NORMAL
SEDIMENTATION RATE, ERYTHROCYTE: 5 MM/HR (ref 0–30)
URINE CULTURE, ROUTINE: NORMAL

## 2023-01-05 PROCEDURE — G8427 DOCREV CUR MEDS BY ELIG CLIN: HCPCS | Performed by: NURSE PRACTITIONER

## 2023-01-05 PROCEDURE — 1123F ACP DISCUSS/DSCN MKR DOCD: CPT | Performed by: NURSE PRACTITIONER

## 2023-01-05 PROCEDURE — 3077F SYST BP >= 140 MM HG: CPT | Performed by: NURSE PRACTITIONER

## 2023-01-05 PROCEDURE — G8417 CALC BMI ABV UP PARAM F/U: HCPCS | Performed by: NURSE PRACTITIONER

## 2023-01-05 PROCEDURE — 1090F PRES/ABSN URINE INCON ASSESS: CPT | Performed by: NURSE PRACTITIONER

## 2023-01-05 PROCEDURE — G8484 FLU IMMUNIZE NO ADMIN: HCPCS | Performed by: NURSE PRACTITIONER

## 2023-01-05 PROCEDURE — 3080F DIAST BP >= 90 MM HG: CPT | Performed by: NURSE PRACTITIONER

## 2023-01-05 PROCEDURE — G8399 PT W/DXA RESULTS DOCUMENT: HCPCS | Performed by: NURSE PRACTITIONER

## 2023-01-05 PROCEDURE — 3017F COLORECTAL CA SCREEN DOC REV: CPT | Performed by: NURSE PRACTITIONER

## 2023-01-05 PROCEDURE — 1036F TOBACCO NON-USER: CPT | Performed by: NURSE PRACTITIONER

## 2023-01-05 PROCEDURE — 99214 OFFICE O/P EST MOD 30 MIN: CPT | Performed by: NURSE PRACTITIONER

## 2023-01-05 RX ORDER — AMLODIPINE BESYLATE 5 MG/1
5 TABLET ORAL DAILY
Qty: 30 TABLET | Refills: 1 | Status: SHIPPED | OUTPATIENT
Start: 2023-01-05

## 2023-01-05 NOTE — H&P (VIEW-ONLY)
CC follow up after [facility]    HPI:  75 y.o. patient of Dr Eldred Dandy with HTN, HLD, and DEJA who presented for follow up after [facility] which demonstrated LV uniform, EF 60%,  and essentially normal cors. She was seen by Dr Eldred Dandy for cardiac clearance for shoulder surgery. She denies cp, sob, LH/dizziness, palpitations, syncope or falls. No LE edema, orthopnea or PND. No n/v/d, fever or GI/ bleeding. Tolerating medications. Past Medical History:   Diagnosis Date    Abnormal EKG     saw cardio, wnl stress test 3/20    Allergic rhinitis     spring trees    Depression     Endometriosis     Essential hypertension     History of thrombophlebitis     right leg    Hyperlipidemia     DEJA on CPAP     cpap    Plantar fasciitis     Recurrent sinus infections      Past Surgical History:   Procedure Laterality Date    ANUS SURGERY      anal fissure    COLONOSCOPY  2014    recheck     COLONOSCOPY  2019    fu 10 yrs    CYST REMOVAL      right foot    HYSTERECTOMY (CERVIX STATUS UNKNOWN)  2005    JOINT REPLACEMENT Right 2011    PARTIAL HYSTERECTOMY (CERVIX NOT REMOVED)  2006    still w cervix and ovaries    SEPTOPLASTY      SUBTOTAL COLECTOMY  2007    perforated colon     TONSILLECTOMY       Family History   Problem Relation Age of Onset    Lung Cancer Mother     Sleep Apnea Mother     Prostate Cancer Father     Prostate Cancer Brother     Breast Cancer Maternal Cousin     Dementia Paternal Aunt      Social History     Tobacco Use    Smoking status: Former     Packs/day: 1.00     Years: 30.00     Pack years: 30.00     Types: Cigarettes     Quit date: 1986     Years since quittin.0    Smokeless tobacco: Never   Substance Use Topics    Alcohol use: Yes    Drug use: Never     Allergies:Cephalexin, Cephalosporins, Penicillins, Atorvastatin, Hazelnut (filbert) allergy skin test, Lisinopril, and Peanut-containing drug products    Review of Systems  General: No changes in weight, fatigue, or night sweats.   HEENT: No blurry or decreased vision. No changes in hearing, nasal discharge or sore throat. Cardiovascular:  See HPI. Respiratory: No cough, hemoptysis, or wheezing. Gastrointestinal:  No abdominal pain, hematochezia, melana, constipation, diarrhea, or history of GI ulcers. Genito-Urinary: No dysuria or hematuria. No urgency or polyuria. Musculoskeletal:  No complaints of joint pain, joint swelling or muscular weakness/soreness. Neurological:  No dizziness, headaches, numbness/tingling, speech problems or weakness. Psychological:  No anxiety or depression. Hematological and Lymphatic: No abnormal bleeding or bruising, blood clots, jaundice or swollen lymph nodes. Endocrine:   No malaise/lethargy, palpitations, polydipsia/polyuria, temperature intolerance or unexpected weight changes  Skin:  No rashes or non-healing ulcers. The 10-year ASCVD risk score (Steven MARIA, et al., 2019) is: 11.8%    Values used to calculate the score:      Age: 75 years      Sex: Female      Is Non- : No      Diabetic: No      Tobacco smoker: No      Systolic Blood Pressure: 786 mmHg      Is BP treated: Yes      HDL Cholesterol: 56 mg/dL      Total Cholesterol: 190 mg/dL    Physical Exam:  BP (!) 140/96   Pulse 68   Wt 198 lb 9.6 oz (90.1 kg)   BMI 29.33 kg/m²    General (appearance):  No acute distress  Eyes: anicteric   Neck: soft, No JVD  Ears/Nose/Mouth/Thorat: No cyanosis  CV: RRR   Respiratory:  clear, normal effort  GI: soft, non-tender, non-distended  Skin: Warm, dry. No rashes  Neuro/Psych: Alert and oriented x 3. Appropriate behavior  Ext:  No c/c.  No edema  Pulses:  2+ radial     Weight  Wt Readings from Last 3 Encounters:   01/05/23 198 lb 9.6 oz (90.1 kg)   12/28/22 195 lb (88.5 kg)   12/27/22 206 lb (93.4 kg)          CBC:   Lab Results   Component Value Date    WBC 5.1 01/04/2023    HGB 13.4 01/04/2023    HCT 40.7 01/04/2023    MCV 91.7 01/04/2023     01/04/2023     BMP:  Lab Results Component Value Date    CREATININE 0.6 2022    BUN 16 2022     2022    K 4.4 2022     2022    CO2 24 2022     Estimated Creatinine Clearance: 107 mL/min (based on SCr of 0.6 mg/dL). Mag: No results found for: MG  LIVER PROFILE:   Lab Results   Component Value Date    ALT 40 2022    AST 40 (H) 2022    ALKPHOS 84 2022    BILITOT 0.5 2022     PT/INR:   Lab Results   Component Value Date    INR 0.88 2022    PROTIME 11.9 2022     Pro-BNP No results found for: PROBNP  LIPIDS:  No components found for: CHLPL  Lab Results   Component Value Date    TRIG 255 (H) 2022    TRIG 272 (H) 2021    TRIG 400 (H) 2020     Lab Results   Component Value Date    HDL 56 2022    HDL 65 (H) 2021    HDL 47 2020     Lab Results   Component Value Date    LDLCALC 83 2022    LDLCALC 67 2021    LDLCALC see below 2020     Lab Results   Component Value Date    LABVLDL 51 2022    LABVLDL 54 2021    LABVLDL see below 2020     TSH:  Lab Results   Component Value Date    TSH 1.86 2021       IMAGIN/28/2022 LHC:  HEMODYNAMICS:  Left ventricular end-diastolic pressure equals 20. There was no significant gradient across the aortic valve by pullback  post cineangiography. LEFT VENTRICULOGRAM:  Left ventriculogram demonstrates uniform wall  motion. Estimated ejection fraction is 60%. LEFT MAIN:  Left main was a very short vessel that gave almost immediate  rise to the LAD and circumflex. The left main was normal.     LEFT ANTERIOR DESCENDING:  The LAD courses to and wraps around the apex. It gave off moderate to large first diagonal branch followed by two  distal diagonal branches. The LAD was essentially normal.     LEFT CIRCUMFLEX:  Left circumflex consists essentially of a bifurcating  marginal branch.   Circumflex is normal.     RIGHT CORONARY ARTERY:  Right coronary artery is a dominant vessel. It  gives off moderate distribution high rising PDA and three distal  posterolateral branches. Right coronary artery is normal.      12/22/2022 Nuc stress: There is a small-sized, mild to moderate intensity reversible defect of the    anteroseptal wall. This is consistent with ischemia . The LVEF is normal and the LV wall motion is normal.        This is an intermediate risk cardiac scan. 2020 echo   Left ventricular cavity size is normal with asymmetric hypertrophy of the   basal septum. Overall left ventricular systolic function appears normal with an estimated   ejection fraction of 55-60%. Estimated pulmonary artery systolic pressure is at 19 mmHg assuming a right   atrial pressure of 3 mmHg. A bubble study was performed and fails to show evidence of shunting. No evidence of significant valvular stenosis or regurgitation   There is an aneurysmal interatrial septum with no evidence of shunting. Medications:   Current Outpatient Medications   Medication Sig Dispense Refill    atenolol (TENORMIN) 50 MG tablet Take 1 tablet by mouth daily 30 tablet 2    losartan (COZAAR) 100 MG tablet Take 1 tablet by mouth daily 90 tablet 1    venlafaxine (EFFEXOR XR) 150 MG extended release capsule TAKE 1 CAPSULE BY MOUTH EVERY DAY 90 capsule 1    rosuvastatin (CRESTOR) 40 MG tablet TAKE 1 TABLET BY MOUTH EVERY DAY 90 tablet 1    Cholecalciferol 50 MCG (2000 UT) TABS Take 1,000 Units by mouth daily       calcium citrate (CALCITRATE) 250 MG TABS tablet Take 1 tablet by mouth daily 60 tablet 0    Omega-3 Fatty Acids (FISH OIL) 1000 MG CAPS Take 1 capsule by mouth daily      Cyanocobalamin (VITAMIN B12 PO) Take by mouth      Multiple Vitamins-Minerals (MULTIVITAMIN ADULT PO) Take by mouth      vitamin C (ASCORBIC ACID) 500 MG tablet Take 500 mg by mouth daily       No current facility-administered medications for this visit. Assessment:  1. Essential hypertension    2. Pre-op evaluation    3. Mixed hyperlipidemia        Plan:  HTN: uncontrolled   Start norvasc 5 mg po daily   Cont atenolol   Cont losartan  HLD: stable    Crestor   Fish oil     Pre-op clearance   She has abnormal ECG and stress test but LHC demonstrated LV uniform, EF 60%,  and essentially normal cors.     No further testing needed   Ok to proceed with surgery     Follow up in 4-6 weeks       Reviewed most recent: CBC, BMP, LFT, Lipids, PT/INR, TSH  Reviewed most recent: ECG, Echo, Nuc stress test, LHC

## 2023-01-05 NOTE — PROGRESS NOTES
Reached out to Emmett Quevedo at Dr Jairo Mora office. Notified of A1C being done 11.1.22 and UA, C&S not being done pre op. Asked if wanted these labs DOS. Also requested the orders for DOS be re faxed as the copy received was not clear./ds    1.06.2023  [id number] Sent another message to Emmett Quevedo to follow up with initial contact yesterday since I had not received any answers to my questions. Still needing orders to be re faxed to PAT and if wanted the missing labs should be added to DOS/DS    1200 Received instructions to add missing lab work to [employer].  Informed the orders will be faxed to PAT again./ds

## 2023-01-05 NOTE — PROGRESS NOTES
CC follow up after [facility]    HPI:  75 y.o. patient of Dr Meenu Holt with HTN, HLD, and DEJA who presented for follow up after [facility] which demonstrated LV uniform, EF 60%,  and essentially normal cors. She was seen by Dr Meenu Holt for cardiac clearance for shoulder surgery. She denies cp, sob, LH/dizziness, palpitations, syncope or falls. No LE edema, orthopnea or PND. No n/v/d, fever or GI/ bleeding. Tolerating medications. Past Medical History:   Diagnosis Date    Abnormal EKG     saw cardio, wnl stress test 3/20    Allergic rhinitis     spring trees    Depression     Endometriosis     Essential hypertension     History of thrombophlebitis     right leg    Hyperlipidemia     DEJA on CPAP     cpap    Plantar fasciitis     Recurrent sinus infections      Past Surgical History:   Procedure Laterality Date    ANUS SURGERY      anal fissure    COLONOSCOPY  2014    recheck     COLONOSCOPY  2019    fu 10 yrs    CYST REMOVAL      right foot    HYSTERECTOMY (CERVIX STATUS UNKNOWN)  2005    JOINT REPLACEMENT Right 2011    PARTIAL HYSTERECTOMY (CERVIX NOT REMOVED)  2006    still w cervix and ovaries    SEPTOPLASTY      SUBTOTAL COLECTOMY  2007    perforated colon     TONSILLECTOMY       Family History   Problem Relation Age of Onset    Lung Cancer Mother     Sleep Apnea Mother     Prostate Cancer Father     Prostate Cancer Brother     Breast Cancer Maternal Cousin     Dementia Paternal Aunt      Social History     Tobacco Use    Smoking status: Former     Packs/day: 1.00     Years: 30.00     Pack years: 30.00     Types: Cigarettes     Quit date: 1986     Years since quittin.0    Smokeless tobacco: Never   Substance Use Topics    Alcohol use: Yes    Drug use: Never     Allergies:Cephalexin, Cephalosporins, Penicillins, Atorvastatin, Hazelnut (filbert) allergy skin test, Lisinopril, and Peanut-containing drug products    Review of Systems  General: No changes in weight, fatigue, or night sweats.   HEENT: No blurry or decreased vision.  No changes in hearing, nasal discharge or sore throat.  Cardiovascular:  See HPI.   Respiratory: No cough, hemoptysis, or wheezing.   Gastrointestinal:  No abdominal pain, hematochezia, melana, constipation, diarrhea, or history of GI ulcers.  Genito-Urinary: No dysuria or hematuria.  No urgency or polyuria.  Musculoskeletal:  No complaints of joint pain, joint swelling or muscular weakness/soreness.  Neurological:  No dizziness, headaches, numbness/tingling, speech problems or weakness.   Psychological:  No anxiety or depression.  Hematological and Lymphatic: No abnormal bleeding or bruising, blood clots, jaundice or swollen lymph nodes.  Endocrine:   No malaise/lethargy, palpitations, polydipsia/polyuria, temperature intolerance or unexpected weight changes  Skin:  No rashes or non-healing ulcers.    The 10-year ASCVD risk score (Steven MARIA, et al., 2019) is: 11.8%    Values used to calculate the score:      Age: 67 years      Sex: Female      Is Non- : No      Diabetic: No      Tobacco smoker: No      Systolic Blood Pressure: 140 mmHg      Is BP treated: Yes      HDL Cholesterol: 56 mg/dL      Total Cholesterol: 190 mg/dL    Physical Exam:  BP (!) 140/96   Pulse 68   Wt 198 lb 9.6 oz (90.1 kg)   BMI 29.33 kg/m²    General (appearance):  No acute distress  Eyes: anicteric   Neck: soft, No JVD  Ears/Nose/Mouth/Thorat: No cyanosis  CV: RRR   Respiratory:  clear, normal effort  GI: soft, non-tender, non-distended  Skin: Warm, dry. No rashes  Neuro/Psych: Alert and oriented x 3. Appropriate behavior  Ext:  No c/c. No edema  Pulses:  2+ radial     Weight  Wt Readings from Last 3 Encounters:   01/05/23 198 lb 9.6 oz (90.1 kg)   12/28/22 195 lb (88.5 kg)   12/27/22 206 lb (93.4 kg)          CBC:   Lab Results   Component Value Date    WBC 5.1 01/04/2023    HGB 13.4 01/04/2023    HCT 40.7 01/04/2023    MCV 91.7 01/04/2023     01/04/2023     BMP:  Lab Results  Component Value Date    CREATININE 0.6 2022    BUN 16 2022     2022    K 4.4 2022     2022    CO2 24 2022     Estimated Creatinine Clearance: 107 mL/min (based on SCr of 0.6 mg/dL). Mag: No results found for: MG  LIVER PROFILE:   Lab Results   Component Value Date    ALT 40 2022    AST 40 (H) 2022    ALKPHOS 84 2022    BILITOT 0.5 2022     PT/INR:   Lab Results   Component Value Date    INR 0.88 2022    PROTIME 11.9 2022     Pro-BNP No results found for: PROBNP  LIPIDS:  No components found for: CHLPL  Lab Results   Component Value Date    TRIG 255 (H) 2022    TRIG 272 (H) 2021    TRIG 400 (H) 2020     Lab Results   Component Value Date    HDL 56 2022    HDL 65 (H) 2021    HDL 47 2020     Lab Results   Component Value Date    LDLCALC 83 2022    LDLCALC 67 2021    LDLCALC see below 2020     Lab Results   Component Value Date    LABVLDL 51 2022    LABVLDL 54 2021    LABVLDL see below 2020     TSH:  Lab Results   Component Value Date    TSH 1.86 2021       IMAGIN/28/2022 LHC:  HEMODYNAMICS:  Left ventricular end-diastolic pressure equals 20. There was no significant gradient across the aortic valve by pullback  post cineangiography. LEFT VENTRICULOGRAM:  Left ventriculogram demonstrates uniform wall  motion. Estimated ejection fraction is 60%. LEFT MAIN:  Left main was a very short vessel that gave almost immediate  rise to the LAD and circumflex. The left main was normal.     LEFT ANTERIOR DESCENDING:  The LAD courses to and wraps around the apex. It gave off moderate to large first diagonal branch followed by two  distal diagonal branches. The LAD was essentially normal.     LEFT CIRCUMFLEX:  Left circumflex consists essentially of a bifurcating  marginal branch.   Circumflex is normal.     RIGHT CORONARY ARTERY:  Right coronary artery is a dominant vessel. It  gives off moderate distribution high rising PDA and three distal  posterolateral branches. Right coronary artery is normal.      12/22/2022 Nuc stress: There is a small-sized, mild to moderate intensity reversible defect of the    anteroseptal wall. This is consistent with ischemia . The LVEF is normal and the LV wall motion is normal.        This is an intermediate risk cardiac scan. 2020 echo   Left ventricular cavity size is normal with asymmetric hypertrophy of the   basal septum. Overall left ventricular systolic function appears normal with an estimated   ejection fraction of 55-60%. Estimated pulmonary artery systolic pressure is at 19 mmHg assuming a right   atrial pressure of 3 mmHg. A bubble study was performed and fails to show evidence of shunting. No evidence of significant valvular stenosis or regurgitation   There is an aneurysmal interatrial septum with no evidence of shunting. Medications:   Current Outpatient Medications   Medication Sig Dispense Refill    atenolol (TENORMIN) 50 MG tablet Take 1 tablet by mouth daily 30 tablet 2    losartan (COZAAR) 100 MG tablet Take 1 tablet by mouth daily 90 tablet 1    venlafaxine (EFFEXOR XR) 150 MG extended release capsule TAKE 1 CAPSULE BY MOUTH EVERY DAY 90 capsule 1    rosuvastatin (CRESTOR) 40 MG tablet TAKE 1 TABLET BY MOUTH EVERY DAY 90 tablet 1    Cholecalciferol 50 MCG (2000 UT) TABS Take 1,000 Units by mouth daily       calcium citrate (CALCITRATE) 250 MG TABS tablet Take 1 tablet by mouth daily 60 tablet 0    Omega-3 Fatty Acids (FISH OIL) 1000 MG CAPS Take 1 capsule by mouth daily      Cyanocobalamin (VITAMIN B12 PO) Take by mouth      Multiple Vitamins-Minerals (MULTIVITAMIN ADULT PO) Take by mouth      vitamin C (ASCORBIC ACID) 500 MG tablet Take 500 mg by mouth daily       No current facility-administered medications for this visit. Assessment:  1. Essential hypertension    2. Pre-op evaluation    3. Mixed hyperlipidemia        Plan:  HTN: uncontrolled   Start norvasc 5 mg po daily   Cont atenolol   Cont losartan  HLD: stable    Crestor   Fish oil     Pre-op clearance   She has abnormal ECG and stress test but LHC demonstrated LV uniform, EF 60%,  and essentially normal cors.     No further testing needed   Ok to proceed with surgery     Follow up in 4-6 weeks       Reviewed most recent: CBC, BMP, LFT, Lipids, PT/INR, TSH  Reviewed most recent: ECG, Echo, Nuc stress test, LHC

## 2023-01-05 NOTE — PROGRESS NOTES
Place patient label inside box (if no patient label, complete below)  Name:  :  MR#:     Shanika Garibay / [address]  I (we), Gilmer Oakes  (Patient Name) authorize DR Fatemeh Holman MD  (Provider / Claritza Ann) and/or such assistants as may be selected by him/her, to perform the following operation/procedure(s): RIGHT REVERSE TOTAL SHOULDER REPLACEMENT WITH LATISSIMUS DORSI TRANSFER       Note: If unable to obtain consent prior to an emergent procedure, document the emergent reason in the medical record. This procedure has been explained to my (our) satisfaction and included in the explanation was: The intended benefit, nature, and extent of the procedure to be performed; The significant risks involved and the probability of success; Alternative procedures and methods of treatment; The dangers and probable consequences of such alternatives (including no procedure or treatment); The expected consequences of the procedure on my future health; Whether other qualified individuals would be performing important surgical tasks and/or whether  would be present to advise or support the procedure. I (we) understand that there are other risks of infection and other serious complications in the pre-operative/procedural and postoperative/procedural stages of my (our) care. I (we) have asked all of the questions which I (we) thought were important in deciding whether or not to undergo treatment or diagnosis. These questions have been answered to my (our) satisfaction. I (we) understand that no assurance can be given that the procedure will be a success, and no guarantee or warranty of success has been given to me (us). It has been explained to me (us) that during the course of the operation/procedure, unforeseen conditions may be revealed that necessitate extension of the original procedure(s) or different procedure(s) than those set forth in Paragraph 1. I (we) authorize and request that the above-named physician, his/her assistants or his/her designees, perform procedures as necessary and desirable if deemed to be in my (our) best interest.     Revised 8/2/2021                                                                          Page 1 of 2       I acknowledge that health care personnel may be observing this procedure for the purpose of medical education or other specified purposes as may be necessary as requested and/or approved by my (our) physician. I (we) consent to the disposal by the hospital Pathologist of the removed tissue, parts or organs in accordance with hospital policy. I do ____ do not ____ consent to the use of a local infiltration pain blocking agent that will be used by my provider/surgical provider to help alleviate pain during my procedure. I do ____ do not ____ consent to an emergent blood transfusion in the case of a life-threatening situation that requires blood components to be administered. This consent is valid for 24 hours from the beginning of the procedure. This patient does ____ or does not ____ currently have a DNR status/order. If DNR order is in place, obtain Addendum to the Surgical Consent for ALL Patients with a DNR Order to address danny-operative status for limited intervention or DNR suspension.      I have read and fully understand the above Consent for Operation/Procedure and that all blanks were completed before I signed the consent.   _____________________________       _____________________      ____/____am/pm  Signature of Patient or legal representative      Printed Name / Relationship            Date / Time   ____________________________       _____________________      ____/____am/pm  Witness to Signature                                    Printed Name                    Date / Time    If patient is unable to sign or is a minor, complete the following)  Patient is a minor, ____ years of age, or unable to sign because:   ______________________________________________________________________________________________    If a phone consent is obtained, consent will be documented by using two health care professionals, each affirming that the consenting party has no questions and gives consent for the procedure discussed with the physician/provider.   _____________________          ____________________       _____/_____am/pm   2nd witness to phone consent        Printed name           Date / Time    Informed Consent:  I have provided the explanation described above in section 1 to the patient and/or legal representative.  I have provided the patient and/or legal representative with an opportunity to ask any questions about the proposed operation/procedure.   ___________________________          ____________________         ____/____am/pm  Provider / Proceduralist                            Printed name            Date / Time  Revised 8/2/2021                                                                      Page 2 of 2

## 2023-01-05 NOTE — PROGRESS NOTES

## 2023-01-06 ENCOUNTER — ANESTHESIA EVENT (OUTPATIENT)
Dept: OPERATING ROOM | Age: 68
End: 2023-01-06
Payer: MEDICARE

## 2023-01-09 ENCOUNTER — ANESTHESIA (OUTPATIENT)
Dept: OPERATING ROOM | Age: 68
End: 2023-01-09
Payer: MEDICARE

## 2023-01-09 ENCOUNTER — APPOINTMENT (OUTPATIENT)
Dept: GENERAL RADIOLOGY | Age: 68
End: 2023-01-09
Attending: ORTHOPAEDIC SURGERY
Payer: MEDICARE

## 2023-01-09 ENCOUNTER — HOSPITAL ENCOUNTER (OUTPATIENT)
Age: 68
Setting detail: OUTPATIENT SURGERY
Discharge: HOME | End: 2023-01-09
Attending: ORTHOPAEDIC SURGERY | Admitting: ORTHOPAEDIC SURGERY
Payer: MEDICARE

## 2023-01-09 VITALS
WEIGHT: 200.8 LBS | TEMPERATURE: 97.2 F | RESPIRATION RATE: 16 BRPM | DIASTOLIC BLOOD PRESSURE: 74 MMHG | HEART RATE: 57 BPM | HEIGHT: 69 IN | OXYGEN SATURATION: 97 % | BODY MASS INDEX: 29.74 KG/M2 | SYSTOLIC BLOOD PRESSURE: 120 MMHG

## 2023-01-09 DIAGNOSIS — M19.019 SHOULDER ARTHRITIS: Primary | ICD-10-CM

## 2023-01-09 LAB
ABO/RH: NORMAL
ANTIBODY SCREEN: NORMAL
BILIRUBIN URINE: NEGATIVE
BLOOD, URINE: NEGATIVE
CLARITY: CLEAR
COLOR: YELLOW
GLUCOSE URINE: NEGATIVE MG/DL
KETONES, URINE: NEGATIVE MG/DL
LEUKOCYTE ESTERASE, URINE: NEGATIVE
MICROSCOPIC EXAMINATION: NORMAL
NITRITE, URINE: NEGATIVE
PH UA: 6 (ref 5–8)
PROTEIN UA: NEGATIVE MG/DL
SPECIFIC GRAVITY UA: 1.02 (ref 1–1.03)
URINE TYPE: NORMAL
UROBILINOGEN, URINE: 0.2 E.U./DL

## 2023-01-09 PROCEDURE — 6360000002 HC RX W HCPCS: Performed by: ORTHOPAEDIC SURGERY

## 2023-01-09 PROCEDURE — 86900 BLOOD TYPING SEROLOGIC ABO: CPT

## 2023-01-09 PROCEDURE — 2580000003 HC RX 258: Performed by: FAMILY MEDICINE

## 2023-01-09 PROCEDURE — 2500000003 HC RX 250 WO HCPCS: Performed by: ANESTHESIOLOGY

## 2023-01-09 PROCEDURE — 86901 BLOOD TYPING SEROLOGIC RH(D): CPT

## 2023-01-09 PROCEDURE — C9290 INJ, BUPIVACAINE LIPOSOME: HCPCS | Performed by: ANESTHESIOLOGY

## 2023-01-09 PROCEDURE — 81003 URINALYSIS AUTO W/O SCOPE: CPT

## 2023-01-09 PROCEDURE — 3700000001 HC ADD 15 MINUTES (ANESTHESIA): Performed by: ORTHOPAEDIC SURGERY

## 2023-01-09 PROCEDURE — 2709999900 HC NON-CHARGEABLE SUPPLY: Performed by: ORTHOPAEDIC SURGERY

## 2023-01-09 PROCEDURE — 97116 GAIT TRAINING THERAPY: CPT

## 2023-01-09 PROCEDURE — 3700000000 HC ANESTHESIA ATTENDED CARE: Performed by: ORTHOPAEDIC SURGERY

## 2023-01-09 PROCEDURE — 97161 PT EVAL LOW COMPLEX 20 MIN: CPT

## 2023-01-09 PROCEDURE — 2720000010 HC SURG SUPPLY STERILE: Performed by: ORTHOPAEDIC SURGERY

## 2023-01-09 PROCEDURE — 97166 OT EVAL MOD COMPLEX 45 MIN: CPT

## 2023-01-09 PROCEDURE — 3600000004 HC SURGERY LEVEL 4 BASE: Performed by: ORTHOPAEDIC SURGERY

## 2023-01-09 PROCEDURE — 6360000002 HC RX W HCPCS: Performed by: NURSE ANESTHETIST, CERTIFIED REGISTERED

## 2023-01-09 PROCEDURE — 83036 HEMOGLOBIN GLYCOSYLATED A1C: CPT

## 2023-01-09 PROCEDURE — 7100000010 HC PHASE II RECOVERY - FIRST 15 MIN: Performed by: ORTHOPAEDIC SURGERY

## 2023-01-09 PROCEDURE — 2580000003 HC RX 258: Performed by: ORTHOPAEDIC SURGERY

## 2023-01-09 PROCEDURE — C1713 ANCHOR/SCREW BN/BN,TIS/BN: HCPCS | Performed by: ORTHOPAEDIC SURGERY

## 2023-01-09 PROCEDURE — 6360000002 HC RX W HCPCS: Performed by: ANESTHESIOLOGY

## 2023-01-09 PROCEDURE — C1776 JOINT DEVICE (IMPLANTABLE): HCPCS | Performed by: ORTHOPAEDIC SURGERY

## 2023-01-09 PROCEDURE — 73020 X-RAY EXAM OF SHOULDER: CPT

## 2023-01-09 PROCEDURE — 97535 SELF CARE MNGMENT TRAINING: CPT

## 2023-01-09 PROCEDURE — 7100000011 HC PHASE II RECOVERY - ADDTL 15 MIN: Performed by: ORTHOPAEDIC SURGERY

## 2023-01-09 PROCEDURE — 3600000014 HC SURGERY LEVEL 4 ADDTL 15MIN: Performed by: ORTHOPAEDIC SURGERY

## 2023-01-09 PROCEDURE — 64415 NJX AA&/STRD BRCH PLXS IMG: CPT | Performed by: ANESTHESIOLOGY

## 2023-01-09 PROCEDURE — 2500000003 HC RX 250 WO HCPCS: Performed by: NURSE ANESTHETIST, CERTIFIED REGISTERED

## 2023-01-09 PROCEDURE — 86850 RBC ANTIBODY SCREEN: CPT

## 2023-01-09 PROCEDURE — 97530 THERAPEUTIC ACTIVITIES: CPT

## 2023-01-09 PROCEDURE — 7100000001 HC PACU RECOVERY - ADDTL 15 MIN: Performed by: ORTHOPAEDIC SURGERY

## 2023-01-09 PROCEDURE — 6370000000 HC RX 637 (ALT 250 FOR IP): Performed by: ORTHOPAEDIC SURGERY

## 2023-01-09 PROCEDURE — 7100000000 HC PACU RECOVERY - FIRST 15 MIN: Performed by: ORTHOPAEDIC SURGERY

## 2023-01-09 PROCEDURE — A4217 STERILE WATER/SALINE, 500 ML: HCPCS | Performed by: ORTHOPAEDIC SURGERY

## 2023-01-09 DEVICE — SCREW, LOCKING BONE, RSP, 5X22
Type: IMPLANTABLE DEVICE | Site: SHOULDER | Status: FUNCTIONAL
Brand: DJO SURGICAL

## 2023-01-09 DEVICE — INSERT HUM 36MM NEUT SM SOCK E PLUS ALTIVATE REV: Type: IMPLANTABLE DEVICE | Site: SHOULDER | Status: FUNCTIONAL

## 2023-01-09 DEVICE — SCREW, LOCKING BONE, RSP, 5X18
Type: IMPLANTABLE DEVICE | Site: SHOULDER | Status: FUNCTIONAL
Brand: DJO SURGICAL

## 2023-01-09 DEVICE — IMPL CAPPED SHOULDER S3 TOTAL ADV REVERSE DJO: Type: IMPLANTABLE DEVICE | Site: SHOULDER | Status: FUNCTIONAL

## 2023-01-09 DEVICE — SCREW, LOCKING BONE, RSP, 5X30
Type: IMPLANTABLE DEVICE | Site: SHOULDER | Status: FUNCTIONAL
Brand: DJO SURGICAL

## 2023-01-09 DEVICE — GLENOID, HEAD W/RETAINING SCREW, RSP, 36MM, NEUTRAL
Type: IMPLANTABLE DEVICE | Site: SHOULDER | Status: FUNCTIONAL
Brand: DJO SURGICAL

## 2023-01-09 DEVICE — BUTTON SUT SHLDR TI DOG BNE: Type: IMPLANTABLE DEVICE | Site: SHOULDER | Status: FUNCTIONAL

## 2023-01-09 DEVICE — RSP BASEPLATE, 30MM, W/P2 COATING
Type: IMPLANTABLE DEVICE | Site: SHOULDER | Status: FUNCTIONAL
Brand: DJO SURGICAL

## 2023-01-09 DEVICE — IMPLANTABLE DEVICE: Type: IMPLANTABLE DEVICE | Site: SHOULDER | Status: FUNCTIONAL

## 2023-01-09 RX ORDER — DEXAMETHASONE SODIUM PHOSPHATE 4 MG/ML
INJECTION, SOLUTION INTRA-ARTICULAR; INTRALESIONAL; INTRAMUSCULAR; INTRAVENOUS; SOFT TISSUE PRN
Status: DISCONTINUED | OUTPATIENT
Start: 2023-01-09 | End: 2023-01-09 | Stop reason: SDUPTHER

## 2023-01-09 RX ORDER — PREGABALIN 150 MG/1
150 CAPSULE ORAL ONCE
Status: COMPLETED | OUTPATIENT
Start: 2023-01-09 | End: 2023-01-09

## 2023-01-09 RX ORDER — PROPOFOL 10 MG/ML
INJECTION, EMULSION INTRAVENOUS PRN
Status: DISCONTINUED | OUTPATIENT
Start: 2023-01-09 | End: 2023-01-09 | Stop reason: SDUPTHER

## 2023-01-09 RX ORDER — LIDOCAINE HYDROCHLORIDE 20 MG/ML
INJECTION, SOLUTION EPIDURAL; INFILTRATION; INTRACAUDAL; PERINEURAL PRN
Status: DISCONTINUED | OUTPATIENT
Start: 2023-01-09 | End: 2023-01-09 | Stop reason: SDUPTHER

## 2023-01-09 RX ORDER — MIDAZOLAM HYDROCHLORIDE 1 MG/ML
INJECTION INTRAMUSCULAR; INTRAVENOUS
Status: COMPLETED
Start: 2023-01-09 | End: 2023-01-09

## 2023-01-09 RX ORDER — VANCOMYCIN HYDROCHLORIDE 1 G/20ML
INJECTION, POWDER, LYOPHILIZED, FOR SOLUTION INTRAVENOUS PRN
Status: DISCONTINUED | OUTPATIENT
Start: 2023-01-09 | End: 2023-01-09 | Stop reason: HOSPADM

## 2023-01-09 RX ORDER — SODIUM CHLORIDE 0.9 % (FLUSH) 0.9 %
5-40 SYRINGE (ML) INJECTION PRN
Status: DISCONTINUED | OUTPATIENT
Start: 2023-01-09 | End: 2023-01-09 | Stop reason: HOSPADM

## 2023-01-09 RX ORDER — MIDAZOLAM HYDROCHLORIDE 1 MG/ML
INJECTION INTRAMUSCULAR; INTRAVENOUS PRN
Status: DISCONTINUED | OUTPATIENT
Start: 2023-01-09 | End: 2023-01-09 | Stop reason: SDUPTHER

## 2023-01-09 RX ORDER — SODIUM CHLORIDE 0.9 % (FLUSH) 0.9 %
5-40 SYRINGE (ML) INJECTION EVERY 12 HOURS SCHEDULED
Status: DISCONTINUED | OUTPATIENT
Start: 2023-01-09 | End: 2023-01-09 | Stop reason: HOSPADM

## 2023-01-09 RX ORDER — ONDANSETRON 2 MG/ML
INJECTION INTRAMUSCULAR; INTRAVENOUS PRN
Status: DISCONTINUED | OUTPATIENT
Start: 2023-01-09 | End: 2023-01-09 | Stop reason: SDUPTHER

## 2023-01-09 RX ORDER — FENTANYL CITRATE 50 UG/ML
INJECTION, SOLUTION INTRAMUSCULAR; INTRAVENOUS PRN
Status: DISCONTINUED | OUTPATIENT
Start: 2023-01-09 | End: 2023-01-09 | Stop reason: SDUPTHER

## 2023-01-09 RX ORDER — ASPIRIN 325 MG
325 TABLET ORAL 2 TIMES DAILY
Qty: 28 TABLET | Refills: 0 | Status: SHIPPED | OUTPATIENT
Start: 2023-01-09 | End: 2023-01-23

## 2023-01-09 RX ORDER — ACETAMINOPHEN 325 MG/1
650 TABLET ORAL
Status: DISCONTINUED | OUTPATIENT
Start: 2023-01-09 | End: 2023-01-09 | Stop reason: HOSPADM

## 2023-01-09 RX ORDER — IPRATROPIUM BROMIDE AND ALBUTEROL SULFATE 2.5; .5 MG/3ML; MG/3ML
1 SOLUTION RESPIRATORY (INHALATION)
Status: DISCONTINUED | OUTPATIENT
Start: 2023-01-09 | End: 2023-01-09 | Stop reason: HOSPADM

## 2023-01-09 RX ORDER — OXYCODONE HYDROCHLORIDE AND ACETAMINOPHEN 5; 325 MG/1; MG/1
1 TABLET ORAL EVERY 6 HOURS PRN
Qty: 28 TABLET | Refills: 0 | Status: SHIPPED | OUTPATIENT
Start: 2023-01-09 | End: 2023-01-16

## 2023-01-09 RX ORDER — SODIUM CHLORIDE, SODIUM LACTATE, POTASSIUM CHLORIDE, CALCIUM CHLORIDE 600; 310; 30; 20 MG/100ML; MG/100ML; MG/100ML; MG/100ML
INJECTION, SOLUTION INTRAVENOUS CONTINUOUS
Status: DISCONTINUED | OUTPATIENT
Start: 2023-01-09 | End: 2023-01-09 | Stop reason: HOSPADM

## 2023-01-09 RX ORDER — SODIUM CHLORIDE 9 MG/ML
INJECTION, SOLUTION INTRAVENOUS PRN
Status: DISCONTINUED | OUTPATIENT
Start: 2023-01-09 | End: 2023-01-09 | Stop reason: HOSPADM

## 2023-01-09 RX ORDER — ONDANSETRON 2 MG/ML
4 INJECTION INTRAMUSCULAR; INTRAVENOUS
Status: DISCONTINUED | OUTPATIENT
Start: 2023-01-09 | End: 2023-01-09 | Stop reason: HOSPADM

## 2023-01-09 RX ORDER — SENNA PLUS 8.6 MG/1
1 TABLET ORAL 2 TIMES DAILY PRN
Qty: 20 TABLET | Refills: 0 | Status: SHIPPED | OUTPATIENT
Start: 2023-01-09 | End: 2023-01-23

## 2023-01-09 RX ORDER — GLYCOPYRROLATE 0.2 MG/ML
INJECTION INTRAMUSCULAR; INTRAVENOUS PRN
Status: DISCONTINUED | OUTPATIENT
Start: 2023-01-09 | End: 2023-01-09 | Stop reason: SDUPTHER

## 2023-01-09 RX ORDER — ROCURONIUM BROMIDE 10 MG/ML
INJECTION, SOLUTION INTRAVENOUS PRN
Status: DISCONTINUED | OUTPATIENT
Start: 2023-01-09 | End: 2023-01-09 | Stop reason: SDUPTHER

## 2023-01-09 RX ORDER — CELECOXIB 200 MG/1
400 CAPSULE ORAL ONCE
Status: COMPLETED | OUTPATIENT
Start: 2023-01-09 | End: 2023-01-09

## 2023-01-09 RX ORDER — FENTANYL CITRATE 50 UG/ML
25 INJECTION, SOLUTION INTRAMUSCULAR; INTRAVENOUS EVERY 5 MIN PRN
Status: DISCONTINUED | OUTPATIENT
Start: 2023-01-09 | End: 2023-01-09 | Stop reason: HOSPADM

## 2023-01-09 RX ORDER — BUPIVACAINE HYDROCHLORIDE 2.5 MG/ML
INJECTION, SOLUTION INFILTRATION; PERINEURAL
Status: COMPLETED | OUTPATIENT
Start: 2023-01-09 | End: 2023-01-09

## 2023-01-09 RX ORDER — LABETALOL HYDROCHLORIDE 5 MG/ML
10 INJECTION, SOLUTION INTRAVENOUS
Status: DISCONTINUED | OUTPATIENT
Start: 2023-01-09 | End: 2023-01-09 | Stop reason: HOSPADM

## 2023-01-09 RX ORDER — ONDANSETRON 4 MG/1
4 TABLET, FILM COATED ORAL EVERY 8 HOURS PRN
Qty: 15 TABLET | Refills: 0 | Status: SHIPPED | OUTPATIENT
Start: 2023-01-09

## 2023-01-09 RX ORDER — PROCHLORPERAZINE EDISYLATE 5 MG/ML
5 INJECTION INTRAMUSCULAR; INTRAVENOUS
Status: DISCONTINUED | OUTPATIENT
Start: 2023-01-09 | End: 2023-01-09 | Stop reason: HOSPADM

## 2023-01-09 RX ORDER — ACETAMINOPHEN 500 MG
1000 TABLET ORAL ONCE
Status: COMPLETED | OUTPATIENT
Start: 2023-01-09 | End: 2023-01-09

## 2023-01-09 RX ORDER — BUPIVACAINE HYDROCHLORIDE 5 MG/ML
INJECTION, SOLUTION EPIDURAL; INTRACAUDAL
Status: DISCONTINUED
Start: 2023-01-09 | End: 2023-01-09 | Stop reason: HOSPADM

## 2023-01-09 RX ORDER — DOXYCYCLINE HYCLATE 100 MG
100 TABLET ORAL 2 TIMES DAILY
Qty: 10 TABLET | Refills: 0 | Status: SHIPPED | OUTPATIENT
Start: 2023-01-09 | End: 2023-01-14

## 2023-01-09 RX ADMIN — PHENYLEPHRINE HYDROCHLORIDE 100 MCG: 10 INJECTION, SOLUTION INTRAMUSCULAR; INTRAVENOUS; SUBCUTANEOUS at 11:06

## 2023-01-09 RX ADMIN — FENTANYL CITRATE 50 MCG: 50 INJECTION, SOLUTION INTRAMUSCULAR; INTRAVENOUS at 09:08

## 2023-01-09 RX ADMIN — PHENYLEPHRINE HYDROCHLORIDE 100 MCG: 10 INJECTION, SOLUTION INTRAMUSCULAR; INTRAVENOUS; SUBCUTANEOUS at 09:50

## 2023-01-09 RX ADMIN — VANCOMYCIN HYDROCHLORIDE 1250 MG: 10 INJECTION, POWDER, LYOPHILIZED, FOR SOLUTION INTRAVENOUS at 09:20

## 2023-01-09 RX ADMIN — BUPIVACAINE 10 ML: 13.3 INJECTION, SUSPENSION, LIPOSOMAL INFILTRATION at 08:43

## 2023-01-09 RX ADMIN — PROPOFOL 50 MG: 10 INJECTION, EMULSION INTRAVENOUS at 11:24

## 2023-01-09 RX ADMIN — PHENYLEPHRINE HYDROCHLORIDE 100 MCG: 10 INJECTION, SOLUTION INTRAMUSCULAR; INTRAVENOUS; SUBCUTANEOUS at 10:13

## 2023-01-09 RX ADMIN — FENTANYL CITRATE 50 MCG: 50 INJECTION, SOLUTION INTRAMUSCULAR; INTRAVENOUS at 11:26

## 2023-01-09 RX ADMIN — SODIUM CHLORIDE, POTASSIUM CHLORIDE, SODIUM LACTATE AND CALCIUM CHLORIDE: 600; 310; 30; 20 INJECTION, SOLUTION INTRAVENOUS at 08:20

## 2023-01-09 RX ADMIN — DEXAMETHASONE SODIUM PHOSPHATE 4 MG: 4 INJECTION, SOLUTION INTRAMUSCULAR; INTRAVENOUS at 09:28

## 2023-01-09 RX ADMIN — FENTANYL CITRATE 50 MCG: 50 INJECTION, SOLUTION INTRAMUSCULAR; INTRAVENOUS at 09:15

## 2023-01-09 RX ADMIN — PHENYLEPHRINE HYDROCHLORIDE 100 MCG: 10 INJECTION, SOLUTION INTRAMUSCULAR; INTRAVENOUS; SUBCUTANEOUS at 11:32

## 2023-01-09 RX ADMIN — PREGABALIN 150 MG: 150 CAPSULE ORAL at 08:20

## 2023-01-09 RX ADMIN — ONDANSETRON 4 MG: 2 INJECTION INTRAMUSCULAR; INTRAVENOUS at 09:28

## 2023-01-09 RX ADMIN — GLYCOPYRROLATE 0.2 MG: 0.2 INJECTION INTRAMUSCULAR; INTRAVENOUS at 09:23

## 2023-01-09 RX ADMIN — ROCURONIUM BROMIDE 100 MG: 10 INJECTION INTRAVENOUS at 09:16

## 2023-01-09 RX ADMIN — ROCURONIUM BROMIDE 10 MG: 10 INJECTION INTRAVENOUS at 11:24

## 2023-01-09 RX ADMIN — PHENYLEPHRINE HYDROCHLORIDE 100 MCG: 10 INJECTION, SOLUTION INTRAMUSCULAR; INTRAVENOUS; SUBCUTANEOUS at 09:35

## 2023-01-09 RX ADMIN — FENTANYL CITRATE 50 MCG: 50 INJECTION, SOLUTION INTRAMUSCULAR; INTRAVENOUS at 12:06

## 2023-01-09 RX ADMIN — SUGAMMADEX 200 MG: 100 INJECTION, SOLUTION INTRAVENOUS at 12:01

## 2023-01-09 RX ADMIN — PROPOFOL 150 MG: 10 INJECTION, EMULSION INTRAVENOUS at 09:15

## 2023-01-09 RX ADMIN — SODIUM CHLORIDE, POTASSIUM CHLORIDE, SODIUM LACTATE AND CALCIUM CHLORIDE: 600; 310; 30; 20 INJECTION, SOLUTION INTRAVENOUS at 10:53

## 2023-01-09 RX ADMIN — PHENYLEPHRINE HYDROCHLORIDE 100 MCG: 10 INJECTION, SOLUTION INTRAMUSCULAR; INTRAVENOUS; SUBCUTANEOUS at 10:07

## 2023-01-09 RX ADMIN — LIDOCAINE HYDROCHLORIDE 50 MG: 20 INJECTION, SOLUTION EPIDURAL; INFILTRATION; INTRACAUDAL; PERINEURAL at 09:15

## 2023-01-09 RX ADMIN — MIDAZOLAM HYDROCHLORIDE 2 MG: 2 INJECTION, SOLUTION INTRAMUSCULAR; INTRAVENOUS at 08:41

## 2023-01-09 RX ADMIN — PHENYLEPHRINE HYDROCHLORIDE 100 MCG: 10 INJECTION, SOLUTION INTRAMUSCULAR; INTRAVENOUS; SUBCUTANEOUS at 10:26

## 2023-01-09 RX ADMIN — BUPIVACAINE HYDROCHLORIDE 20 ML: 2.5 INJECTION, SOLUTION INFILTRATION; PERINEURAL at 08:43

## 2023-01-09 RX ADMIN — CELECOXIB 400 MG: 200 CAPSULE ORAL at 08:20

## 2023-01-09 RX ADMIN — ACETAMINOPHEN 1000 MG: 500 TABLET, FILM COATED ORAL at 08:20

## 2023-01-09 RX ADMIN — PHENYLEPHRINE HYDROCHLORIDE 100 MCG: 10 INJECTION, SOLUTION INTRAMUSCULAR; INTRAVENOUS; SUBCUTANEOUS at 10:15

## 2023-01-09 ASSESSMENT — PAIN SCALES - GENERAL
PAINLEVEL_OUTOF10: 0

## 2023-01-09 ASSESSMENT — PAIN DESCRIPTION - LOCATION: LOCATION: SHOULDER

## 2023-01-09 NOTE — ANESTHESIA PROCEDURE NOTES
Peripheral Block    Patient location during procedure: pre-op  Reason for block: post-op pain management  Start time: 1/9/2023 8:43 AM  End time: 1/9/2023 8:48 AM  Staffing  Performed: anesthesiologist   Anesthesiologist: Yfn Regalado MD  Preanesthetic Checklist  Completed: patient identified, IV checked, site marked, risks and benefits discussed, surgical/procedural consents, equipment checked, pre-op evaluation, timeout performed, anesthesia consent given, oxygen available, monitors applied/VS acknowledged, fire risk safety assessment completed and verbalized and blood product R/B/A discussed and consented  Peripheral Block   Patient position: supine  Prep: ChloraPrep  Patient monitoring: cardiac monitor, continuous pulse ox, continuous capnometry, frequent blood pressure checks, IV access, oxygen and responsive to questions  Block type: Brachial plexus  Interscalene  Laterality: right  Injection technique: single-shot  Guidance: ultrasound guided    Needle   Needle type: insulated echogenic nerve stimulator needle   Needle gauge: 22 G  Needle localization: ultrasound guidance  Needle length: 8 cm  Assessment   Injection assessment: negative aspiration for heme, no paresthesia on injection, local visualized surrounding nerve on ultrasound, no intravascular symptoms and low pressure verified by pressure monitor  Paresthesia pain: none  Slow fractionated injection: yes  Hemodynamics: stable  Real-time US image taken/store: yes  Outcomes: patient tolerated procedure well and uncomplicated    Additional Notes  10 ml exparel + 20 ml 0.25% bupivacaine, mixed and injected in 5 ml increments.   Medications Administered  bupivacaine liposome 1.3 % - Perineural   10 mL - 1/9/2023 8:43:00 AM  bupivacaine 0.25 % - Perineural   20 mL - 1/9/2023 8:43:00 AM

## 2023-01-09 NOTE — PROGRESS NOTES
Occupational Therapy  Facility/Department: [address] [place] [address]  Occupational Therapy Initial Assessment    Name: David Polanco  : 1955  MRN: [id number]  Date of Service: 2023    Discharge Recommendations: David Polanco scored a 18/24 on the AM-PAC ADL Inpatient form. Current research shows that an AM-PAC score of 18 or greater is typically associated with a discharge to the patient's home setting. Based on the patient's AM-PAC score, and their current ADL deficits, it is recommended that the patient have 2-3 sessions per week of Occupational Therapy at d/c to increase the patient's independence. At this time, this patient demonstrates the endurance and safety to discharge home with (home vs OP services) and a follow up treatment frequency of 2-3x/wk. Please see assessment section for further patient specific details. If patient discharges prior to next session this note will serve as a discharge summary. Please see below for the latest assessment towards goals. OT Equipment Recommendations  Equipment Needed: No       Patient Diagnosis(es): The encounter diagnosis was Shoulder arthritis. Past Medical History:  has a past medical history of Abnormal EKG, Allergic rhinitis, Arthritis, Depression, Depression, Endometriosis, Essential hypertension, History of thrombophlebitis, Hyperlipidemia, DEJA on CPAP, Plantar fasciitis, and Recurrent sinus infections. Past Surgical History:  has a past surgical history that includes subtotal colectomy (2007); Partial hysterectomy (); Total hip arthroplasty (Right, ); cyst removal; Septoplasty; Tonsillectomy and adenoidectomy; Anus surgery; Colonoscopy (2014); and Colonoscopy (2019). Assessment   Assessment: From home alone, planning to dc to friends house for assist. Pt completing mobility with CGA this date, unsteady at times but able to correct. Assist with donning brace and dressing.  Pt eval in pacu completing mobility to and from bathroom, and hallway + 3 steps. Pt planning dc home with assist.  Decision Making: Medium Complexity  REQUIRES OT FOLLOW-UP: No  Activity Tolerance  Activity Tolerance: Patient Tolerated treatment well        Plan     dc    Restrictions  Position Activity Restriction  Other position/activity restrictions: activity as tolerated, weight restrictions NWB    Subjective   Subjective  Subjective: In bed agreebale reporting no pain in PACU     Social/Functional History  Social/Functional History  Lives With:  (Lives alone, planning to dc to friend Abhay quinn who is a retired RN.)  Type of Home: [address]: One level  Home Access: Stairs to enter with [address] - Number of Steps: 3  Entrance Stairs - Rails: Left  Bathroom Shower/Tub: Walk-in shower  Bathroom Toilet: Handicap height  Bathroom Equipment: Grab bars in shower, Shower chair  Home Equipment:  (none)  ADL Assistance: Independent  Homemaking Assistance: Independent  Ambulation Assistance: Independent  Transfer Assistance: Independent  Active : Yes  Type of Occupation: [employer] in [facility]. doesnt work in winter                 Observation/Palpation  Observation: right shoulder immobilizer  Safety Devices  Type of Devices: Call light within reach; Left in bed;Nurse notified (PACU)  Bed Mobility Training  Bed Mobility Training: Yes  Supine to Sit: Stand-by assistance  Sit to Supine: Stand-by assistance  Balance  Sitting: Intact  Standing: With support  Transfer Training  Transfer Training: Yes  Sit to Stand: Contact-guard assistance  Stand to Sit: Contact-guard assistance  Toilet Transfer: Contact-guard assistance  Gait  Overall Level of Assistance: Contact-guard assistance (unsteady at times but able to correct, CGA throughout)  Assistive Device: Gait belt        ADL  Feeding: Independent  UE Dressing: Dependent/Total  LE Dressing: Minimal assistance  Toileting: Contact guard assistance     Activity Tolerance  Activity Tolerance: Patient tolerated treatment well;Patient tolerated evaluation without incident        Vision  Vision: Impaired  Vision Exceptions: Wears glasses for reading  Hearing  Hearing: Within functional limits  Cognition  Overall Cognitive Status: WFL  Orientation  Overall Orientation Status: Within Functional Limits                  Education Given To: Patient  Education Provided: Role of Therapy;Transfer Training;Plan of Care;ADL Adaptive Strategies  Education Method: Demonstration;Verbal  Barriers to Learning: None  Education Outcome: Demonstrated understanding;Continued education needed;Verbalized understanding                          AM-PAC Score        AM-[facility] Inpatient Daily Activity Raw Score: 18 (01/09/23 1450)  AM-PAC Inpatient ADL T-Scale Score : 38.66 (01/09/23 1450)  ADL Inpatient CMS 0-100% Score: 46.65 (01/09/23 1450)  ADL Inpatient CMS G-Code Modifier : CK (01/09/23 1450)        Therapy Time   Individual Concurrent Group Co-treatment   Time In 1351         Time Out 1430         Minutes 39          Timed Code Treatment Minutes:   24    Total Treatment Minutes: 39      (Patient/Family) educated on shoulder immobilizer; including purpose, donning/doffing, fit/positioning, and wear schedule as ordered by surgical team. (Patient/Family) verbalized and performed return demonstration, confirming understanding.       Ulises Vargas, OT

## 2023-01-09 NOTE — PROGRESS NOTES
Physical Therapy  Facility/Department: [facility] GENERAL SURGERY  Physical Therapy Initial Assessment, Treatment and Discharge    Name: Sam Olson  : 1955  MRN: [id number]  Date of Service: 2023    Discharge Recommendations:    Sam Olson scored a 18/24 on the AM-PAC short mobility form. Current research shows that an AM-PAC score of 18 or greater is typically associated with a discharge to the patient's home setting. Based on the patient's AM-PAC score and their current functional mobility deficits, it is recommended that the patient have 2-3 sessions per week of Physical Therapy at d/c to increase the patient's independence. At this time, this patient demonstrates the endurance and safety to discharge home with  (home vs OP services) and a follow up treatment frequency of 2-3x/wk. Please see assessment section for further patient specific details. If patient discharges prior to next session this note will serve as a discharge summary. Please see below for the latest assessment towards goals. PT Equipment Recommendations  Equipment Needed: No      Patient Diagnosis(es): The encounter diagnosis was Shoulder arthritis. Past Medical History:  has a past medical history of Abnormal EKG, Allergic rhinitis, Arthritis, Depression, Depression, Endometriosis, Essential hypertension, History of thrombophlebitis, Hyperlipidemia, DEJA on CPAP, Plantar fasciitis, and Recurrent sinus infections. Past Surgical History:  has a past surgical history that includes subtotal colectomy (2007); Partial hysterectomy (); Total hip arthroplasty (Right, ); cyst removal; Septoplasty; Tonsillectomy and adenoidectomy; Anus surgery; Colonoscopy (2014); and Colonoscopy (2019). Assessment   Assessment: Pt post right TSR. Plans to go home with friend for the next several days for 24 hr A, will then follow up with OPPT when ordered.   No further acute therapy needs noted  Treatment Diagnosis: Decreased functional mobility post right TSR  Barriers to Learning: none noted  Activity Tolerance  Activity Tolerance: Patient tolerated treatment well;Patient tolerated evaluation without incident     Plan   Physcial Therapy Plan  General Plan: Discharge  Safety Devices  Type of Devices: Call light within reach, Left in bed, Nurse notified     Restrictions  Position Activity Restriction  Other position/activity restrictions: activity as tolerated, weight restrictions     Subjective   General  Chart Reviewed: Yes  Referring Practitioner: Darya Villa  Diagnosis: Right TSR  Subjective  Subjective: Pt in bed upon PT entry, agreeable to working with PT         Social/Functional History  Social/Functional History  Lives With:  (Lives alone, planning to dc to friend Jailene quinn who is a retired RN.)  [facility]: One level  Home Access: Stairs to enter with [address] - Number of Steps: 3  Entrance Stairs - Rails: Left  Bathroom Shower/Tub: Walk-in shower  Bathroom Toilet: Handicap height  Bathroom Equipment: Grab bars in shower, Shower chair  Home Equipment:  (none)  ADL Assistance: Independent  Homemaking Assistance: Independent  Ambulation Assistance: Independent  Transfer Assistance: Independent  Active : Yes  Type of Occupation: [employer] in [facility] [employer] [address]  doesnt work in winter  [address]: Impaired  Vision Exceptions: Wears glasses for reading  Hearing  Hearing: Within functional limits    Cognition   Orientation  Overall Orientation Status: Within Functional Limits  Cognition  Overall Cognitive Status: WFL     Objective   Heart Rate: (P) 53  Heart Rate Source: Monitor  BP: 123/77  BP Location: Left upper arm  BP Method: Automatic  Patient Position: Semi fowlers  MAP (Calculated): 92  Resp: 15  SpO2: (P) 96 %  O2 Device: Nasal cannula     Observation/Palpation  Observation: right shoulder immobilizer        AROM RLE (degrees)  RLE AROM: WFL  AROM LLE (degrees)  LLE AROM : WFL  Strength RLE  Strength RLE: WFL  Strength LLE  Strength LLE: WFL           Bed mobility  Supine to Sit: Contact guard assistance  Sit to Supine: Contact guard assistance  Scooting: Contact guard assistance  Transfers  Sit to Stand: Contact guard assistance  Stand to Sit: Contact guard assistance  Ambulation  Surface: Level tile  Device: No Device  Assistance: Contact guard assistance  Quality of Gait: wide AARON samanta ER,  slow gait, appeared slightly unsteady but no LOB noted  Gait Deviations: Decreased step height;Decreased step length; Increased AARON; Slow Destiny  Stairs/Curb  Stairs?: Yes (up and down three steps with left rail, one at a time to ascend,  sideways to descend)     AM-PAC Score  AM-PAC Inpatient Mobility Raw Score : 18 (01/09/23 1436)  AM-PAC Inpatient T-Scale Score : 43.63 (01/09/23 1436)  Mobility Inpatient CMS 0-100% Score: 46.58 (01/09/23 1436)  Mobility Inpatient CMS G-Code Modifier : CK (01/09/23 1436)     Education role of PT, safety, bed mobility, transfers, gait, WB restrictions. ROM of wrist and hand.          Therapy Time   Individual Concurrent Group Co-treatment   Time In [address] [place] [address] Ln         Time Out 0228         Minutes 38             Timed Code Treatment Minutes:   25    Total Treatment Minutes:  Karmen 26, [id number]

## 2023-01-09 NOTE — PROGRESS NOTES
Arrived in [facility] for rt. Shoulder surgery see consent. NPO.  Denies any pain at rest

## 2023-01-09 NOTE — FLOWSHEET NOTE
PACU Transfer to [facility]    Vitals:    01/09/23 1445   BP: 120/74   Pulse: 57   Resp: 16   Temp: 97.2 °F (36.2 °C)   SpO2: 97%         Intake/Output Summary (Last 24 hours) at 1/9/2023 1452  Last data filed at 1/9/2023 1436  Gross per 24 hour   Intake 1910 ml   Output 350 ml   Net 1560 ml       Pain assessment:  none  Pain Level: 0    Patient transferred to care of [facility] RN. Spoke with patients friend Rio Gresham updated her on the patients status. Patient transferred to [facility]  Via PACU transporter with all belongings.     1/9/2023 2:52 PM

## 2023-01-09 NOTE — PROGRESS NOTES
Ambulatory Surgery/Procedure Discharge Note    Vitals:    01/09/23 1445   BP: 120/74   Pulse: 57   Resp: 16   Temp: 97.2 °F (36.2 °C)   SpO2: 97%       In: 1910 [P.O.:260; I.V.:1400]  Out: 350     Restroom use offered before discharge. Yes    Pain assessment:  level of pain (1-10, 10 severe),   Pain Level: 0    Patient discharged with friend. Patient discharged to home/self care.  Patient discharged via wheel chair by transporter to waiting family/S.O.       1/9/2023 3:05 PM

## 2023-01-09 NOTE — DISCHARGE INSTRUCTIONS
PATIENT INSTRUCTIONS FOLLOWING OUT-PATIENT   SHOULDER SURGERY    1. You have probably had a Scalene Block to your arm. Because this numbs the arm this is great for anesthesia since we didnt have to give you as much anesthetic agent during the case--hopefully allowing you to feel more normal sooner. The block should last around 12 hours or so. So, if you had surgery at [address] [place] [address] it may wear off as soon as 8PM. When the block wears off, it wears off suddenly and you could go from no pain to severe pain quickly. We recommend that you take pain medication (typically 1-2 Percocet) by 8PM even if you are feeling only mild pain. It is easier to maintain good pain relief than to try to catch up.     2. You may have to take the pain medicine every 4-8 hours for the first day or two. After this, you can wean off the medicine and just take it as needed. 3. All pain medications can make you constipated. Drink plenty of fluids and eat lots of fiber to combat this. If you go for more than 3 days without a bowel movement, try a laxative. We recommend Miralax, an over the counter laxative, and/or colace, an over the counter stool softener. 4. Getting comfortable for sleeping is difficult after this surgery. It gets easier after several days. Many patients sleep better in a reclining lounger like a Lay-Z-Boy or in bed with several pillows to help prop them up. 5. Ice packs or a commercial ice cuff/machine are very helpful to reduce pain and inflammation after the surgery. Since the bandage is so thick you can leave the ice bags on top until the shoulder gets cold. Dont ice bare skin however or you could get frostbite. 6. Anesthesia and the pain medication can cause nausea. If this is severe or leads to throwing up call our office at [phone] so we can call an anti-nausea medicine into your pharmacy. Have the pharmacy phone number handy--its on the prescription bottles.     7. You probably have your first post-op appointment already scheduled, along with your first physical therapy appointment. If not, call us at [phone] to schedule it. We like to see you 5-8 days after the operation. 8. Getting dressed. Huge shirts work well to cover the shoulder. We will show you how to put a shirt on at the first office visit so that you can then start wearing the shirt under the sling. 9. Have someone drive you to the first appointment. You will be in a big sling and still probably taking pain medication. That wouldnt look good if you got in an accident. 10. If we gave you arthroscopic snap shots of your repaired shoulder or elbow, please bring them with you to the first office visit so that we can review them together. 11. Keep the dressing dry. The bulky dressing can be removed after 3 days after which it is okay to shower. Until then, however, sponge baths should be done for hygeine. Do not remove the steri-strips. We typically have patients leave them on until they slowly curl up and fall off. The longer the steri-strips are on the prettier the wounds look. Leave the see-through mesh dressing beneath your bandages in place when you eventually remove your outer bandages, we will change it at your first post-operative appointment. 12. Any questions or problems contact us anytime at [phone]. GENERAL OR TWILIGHT ANESTHESIA    1. For a minimum of 24 hours:   No driving or operating heavy machinery   No alcohol, sleeping pills, or tranquilizers   No signing important legal documents  2. Limit your general activity and rest for 24 hours. Resume your normal activities as you begin to feel better. 3.  Eat lightly for your first meal.  Advance to regular diet as tolerated. 4.  Cough and deep breathe every hour while awake for the next 24 hours  5.   If you have any problems and are unable to contact your doctor, go to the nearest emergency department. NERVE BLOCK INSTRUCTIONS      1. Your affected limb will be numb until the block starts to wear off  2. Do not use the affected limb until the block wears off  3. Start taking pain medication before the block wears off--expect pain to increase over the next 12 hours. Please remember while having a nerve block you are at an increased risk for [address] were given a nerve block today from the anesthesiologist. Most nerve blocks last anywhere from 6-36 hours. You should start taking your pain medication before the block wears off or when you first begin feeling discomfort. It takes at least 30-60 minutes for a pain pill to take effect. Pain medications should be taken with food. Consider setting an alarm through the night to help manage your pain level so you do not wake up with too much pain. Pain medicines can cause more sedation and decrease your breathing so ONLY take as directed. If you have Sleep Apnea, you need to use your C-Pap machine. What to expect after a nerve block:    Numbness, tingling- affected area feels heavy or asleep   Weakness or inability to move or control your affected area   Inability to feel temperature changes to your affected area  Usually the weakness wears off first, followed by the tingling or heaviness. You may notice more pain at this point and should start taking your pain meds.    If you had a shoulder block, you may experience:   Mild shortness of breath (may be relieved by sitting up in a chair or recliner)   Hoarse voice   Blurry vision   Unequal pupils   Drooping of your face (eye or lip) on the same side as the nerve block   Swelling at the injection site on the side of your neck  These side effects should resolve as the block wears off   IF you have severe or prolonged shortness of breath- GO to the nearest Emergency Room  If you had a nerve block of your arm or leg:   Protect the arm or leg from extreme hot or cold temperatures   Protect the arm or leg from obstructing blood flow by frequent position changes- Make sure fingers/ toes stay pink and warm. Call surgeon with any changes   For leg block, get assistance walking until the block wears off, i.e.:  crutches, walker, support person    If you continue to feel the effects of the nerve block for longer than 72 hours- call [facility] at [phone] and ask to speak with the Anesthesiologist on call. Your Surgeon or Anesthesiologist gave you Exparel     Exparel (bupivicaine liposome injectable suspension) is a medication injected into the surgical incision  just before the end of the procedure by your surgeon to help control your pain after surgery. It can also be given as a nerve block by the anesthesiologist. This local anesthetic provides pain relief by numbing the tissue around the surgical site. Exparel releases pain medication over time lasting up to 5 days or 120 hours. Exparel may cause a temporary loss of sensation or the ability to move in the area where the medication was injected. Side effects of Exparel that may occur include nausea, vomiting or constipation. Call immediately if you experience numbness or tingling of the mouth or lips, lightheadedness or severe anxiety. Notify your physician if you experience these or any other possible side effects of the medication. Note: Other forms of bupivacaine should not be administered within 96 hours (4 days) following administration of Exparel. Please keep ID band in place for 96 hours (4 days). [facility]    There are potential side effects of anesthesia or sedation you may experience for the first 24 hours. These side effects include:    Confusion or Memory loss, Dizziness, or Delayed Reaction Times   [x]A responsible person should be with you for the next 24 hours.   Do not operate any vehicles (automobiles, bicycles, motorcycles) or power tools or machinery for 24 hours. Do not sign any legal documents or make any legal decisions for 24 hours. Do not drink alcohol for 24 hours or while taking narcotic pain medication. Nausea    [x]Start with light diet and progress to your normal diet as you feel like eating. However, if you experience nausea or repeated episodes of vomiting which persist beyond 12-24 hours, notify your physician. Once nausea has passed, remember to keep drinking fluids. Difficulty Passing Urine  [x]Drink extra amounts of fluid today. Notify your physician if you have not urinated within 8 hours after your procedure or you feel uncomfortable. Irritated Throat from a Breathing Tube  [x]Drink extra amounts of fluid today. Lozenges may help. Muscle Aches  [x]You may experience some generalized body aches as your muscles recover from medications used to relax them during surgery. These will gradually subside. MEDICATION INSTRUCTIONS:    [x]Prescription(S) x   5      Called to Pharmacy Name and location: [facility]    [x]Give the list of your medications to your primary care physician on your next visit. Keep your med list updated and carry it with in case of emergencies. [x] Narcotic pain medications can cause the side effect of significant constipation. You may want to add a stool softener to your postoperative medication schedule or speak to your surgeon on how best to manage this side effect. NARCOTIC SAFETY:  Your pain medicine is only for you to take. Safely store your medicines. Store pills up high and out of reach of children and pets. Ensure safety caps are snapped tightly  Keep track of how many pills you have left    Unused medication can be disposed of by taking them to a drop-off box or take-back program that is authorized by the [facility]. Access to a site near you can be found on the [facility] [place] website ([address]. [facility]).     If you have a CPAP machine, it is very important that you use it daily during all periods of sleep and daytime rest during your recovery at home. Surgery and Anesthesia place a significant amount of stress on your body. Using your CPAP will help keep you safe and lessen the negative effects of that stress. FOLLOW-UP RECOVERY CARE:  [x]Call the office at [phone] for follow-up appointment and problems    Watch for these possible complications, symptoms, or side effects of anesthesia. Call physician if they or any other problems occur:  Signs of INFECTION   > Fever over 101°     > Redness, swelling, hardness or warmth at the operative site   >Foul smelling or cloudy drainage at the operative site   Unrelieved PAIN  Unrelieved NAUSEA  Blood soaked dressing. (Some oozing may be normal)  Inability to urinate      Numb, pale, blue, cold or tingling extremity      Physician:  Dr. Tyron Whipple    The above instructions were reviewed with patient/significant other. The following additional patient specific information was reviewed with the patient/significant other:  [x]Procedure/physician specific instructions  []Medication information sheet(S) including potential side effects  []Radhas egress test  []Pain Ball management  []FAQ Catheter associated blood stream infections  [x]FAQ Surgical Site Infections  [x]Other- nerve block-Exparel    I have read and understand the instructions given to me: ____________________________________________   (Patient/S.O. Signature)            Date/time 1/9/2023 11:57 AM         PACU:  [phone]   M-F 700 AM - 7 PM      SAME DAY SERVICES:  [phone] M-F 7AM-6PM        If you smoke STOP. We care about your health!

## 2023-01-09 NOTE — ANESTHESIA PRE PROCEDURE
Department of Anesthesiology  Preprocedure Note       Name:  Vasu Winkler   Age:  75 y.o.  :  1955                                          MRN:  [id number]         Date:  2023      Surgeon: Akash Jamison):  Rogelio Abdalla MD    Procedure: Procedure(s):  RIGHT REVERSE TOTAL SHOULDER REPLACEMENT WITH LATISSIMUS DORSI TRANSFER    Medications prior to admission:   Prior to Admission medications    Medication Sig Start Date End Date Taking?  Authorizing Provider   amLODIPine (NORVASC) 5 MG tablet Take 1 tablet by mouth daily 23   Jessica Enriquez, APRN - CNP   atenolol (TENORMIN) 50 MG tablet Take 1 tablet by mouth daily 22   Casimiro Joseph MD   losartan (COZAAR) 100 MG tablet Take 1 tablet by mouth daily 22   Casimiro Joseph MD   venlafaxine (EFFEXOR XR) 150 MG extended release capsule TAKE 1 CAPSULE BY MOUTH EVERY DAY 22   Casimiro Joseph MD   rosuvastatin (CRESTOR) 40 MG tablet TAKE 1 TABLET BY MOUTH EVERY DAY 8/3/22   Casimiro Joseph MD   Cholecalciferol 50 MCG (2000 UT) TABS Take 1,000 Units by mouth daily     Historical Provider, MD   calcium citrate (CALCITRATE) 250 MG TABS tablet Take 1 tablet by mouth daily 20   Casimiro Joseph MD   Omega-3 Fatty Acids (FISH OIL) 1000 MG CAPS Take 1 capsule by mouth daily 20   Casimiro Joseph MD   Cyanocobalamin (VITAMIN B12 PO) Take by mouth    Historical MD Celia   Multiple Vitamins-Minerals (MULTIVITAMIN ADULT PO) Take by mouth    Historical Provider, MD   vitamin C (ASCORBIC ACID) 500 MG tablet Take 500 mg by mouth daily    Historical Provider, MD       Current medications:    Current Facility-Administered Medications   Medication Dose Route Frequency Provider Last Rate Last Admin    lactated ringers infusion   IntraVENous Continuous Fabiola Chavez MD 50 mL/hr at 23 0820 New Bag at 23 0820    vancomycin (VANCOCIN) 1,250 mg in dextrose 5 % 250 mL IVPB  15 mg/kg IntraVENous Once Georgette Lawrence MD        bupivacaine (PF) (MARCAINE) 0.5 % injection             midazolam (VERSED) 2 MG/2ML injection                Allergies: Allergies   Allergen Reactions    Cephalexin Rash    Cephalosporins Hives and Shortness Of Breath    Penicillins Anaphylaxis and Rash    Atorvastatin      Muscle weakness    Hazelnut (Filbert) Allergy Skin Test     Lisinopril      Itching Sensation in throat; full feeling in throat    Peanut-Containing Drug Products        Problem List:    Patient Active Problem List   Diagnosis Code    Depression F32. A    Endometriosis N80.9    Essential hypertension I10    Hyperlipidemia E78.5    Obstructive sleep apnea syndrome G47.33    Abnormal myocardial perfusion study R94.39       Past Medical History:        Diagnosis Date    Abnormal EKG     saw cardio, wnl stress test 3/20    Allergic rhinitis     spring trees    Arthritis     Depression     Depression     Endometriosis     Essential hypertension     History of thrombophlebitis     right leg    Hyperlipidemia     DEJA on CPAP     cpap    Plantar fasciitis     Recurrent sinus infections        Past Surgical History:        Procedure Laterality Date    ANUS SURGERY      anal fissure    COLONOSCOPY  2014    recheck     COLONOSCOPY  2019    fu 10 yrs    CYST REMOVAL      right foot    PARTIAL HYSTERECTOMY (CERVIX NOT REMOVED)      still w cervix and ovaries    SEPTOPLASTY      SUBTOTAL COLECTOMY  2007    perforated colon     TONSILLECTOMY AND ADENOIDECTOMY      TOTAL HIP ARTHROPLASTY Right        Social History:    Social History     Tobacco Use    Smoking status: Former     Packs/day: 1.00     Years: 30.00     Pack years: 30.00     Types: Cigarettes     Quit date: 1986     Years since quittin.0    Smokeless tobacco: Never   Substance Use Topics    Alcohol use:  Yes     Alcohol/week: 3.0 standard drinks     Types: 3 Glasses of wine per week Counseling given: Not Answered      Vital Signs (Current):   Vitals:    01/09/23 0843 01/09/23 0845 01/09/23 0846 01/09/23 0847   BP: (!) 160/91 (!) 151/86     Pulse: 63 63 62 60   Resp: 17 17 21 14   Temp:       TempSrc:       SpO2: 96% 97%     Weight:       Height:                                                  BP Readings from Last 3 Encounters:   01/09/23 (!) 151/86   01/05/23 (!) 140/96   12/27/22 130/80       NPO Status: Time of last liquid consumption: 0000                        Time of last solid consumption: 2230                        Date of last liquid consumption: 01/09/23                        Date of last solid food consumption: 01/08/23    BMI:   Wt Readings from Last 3 Encounters:   01/09/23 200 lb 12.8 oz (91.1 kg)   01/05/23 198 lb 9.6 oz (90.1 kg)   12/28/22 195 lb (88.5 kg)     Body mass index is 29.65 kg/m². CBC:   Lab Results   Component Value Date/Time    WBC 5.1 01/04/2023 03:51 PM    RBC 4.44 01/04/2023 03:51 PM    HGB 13.4 01/04/2023 03:51 PM    HCT 40.7 01/04/2023 03:51 PM    MCV 91.7 01/04/2023 03:51 PM    RDW 13.3 01/04/2023 03:51 PM     01/04/2023 03:51 PM       CMP:   Lab Results   Component Value Date/Time     12/28/2022 06:59 AM    K 4.4 12/28/2022 06:59 AM     12/28/2022 06:59 AM    CO2 24 12/28/2022 06:59 AM    BUN 16 12/28/2022 06:59 AM    CREATININE 0.6 12/28/2022 06:59 AM    GFRAA >60 11/19/2021 03:41 PM    AGRATIO 2.5 11/01/2022 11:02 AM    LABGLOM >60 12/28/2022 06:59 AM    GLUCOSE 122 12/28/2022 06:59 AM    PROT 7.0 11/01/2022 11:02 AM    CALCIUM 9.5 12/28/2022 06:59 AM    BILITOT 0.5 11/01/2022 11:02 AM    ALKPHOS 84 11/01/2022 11:02 AM    AST 40 11/01/2022 11:02 AM    ALT 40 11/01/2022 11:02 AM       POC Tests: No results for input(s): POCGLU, POCNA, POCK, POCCL, POCBUN, POCHEMO, POCHCT in the last 72 hours.     Coags:   Lab Results   Component Value Date/Time    PROTIME 11.9 12/28/2022 06:59 AM    INR 0.88 12/28/2022 06:59 AM       HCG (If Applicable): No results found for: PREGTESTUR, PREGSERUM, HCG, HCGQUANT     ABGs: No results found for: PHART, PO2ART, XQX2UMS, VVL6VYX, BEART, D7MILJWL     Type & Screen (If Applicable):  No results found for: LABABO, LABRH    Drug/Infectious Status (If Applicable):  No results found for: HIV, HEPCAB    COVID-19 Screening (If Applicable): No results found for: COVID19        Anesthesia Evaluation  Patient summary reviewed and Nursing notes reviewed  Airway: Mallampati: III  TM distance: >3 FB   Neck ROM: full  Mouth opening: > = 3 FB   Dental:          Pulmonary: breath sounds clear to auscultation  (+) sleep apnea: on CPAP,                            ROS comment: CPAP setting -- 9   Cardiovascular:  Exercise tolerance: good (>4 METS),   (+) hypertension: moderate,         Rhythm: regular  Rate: normal                 ROS comment: Recent ekg change, cleared by cardiology     Neuro/Psych:   (+) psychiatric history: stable with treatment            GI/Hepatic/Renal:             Endo/Other:                     Abdominal:             Vascular: Other Findings:           Anesthesia Plan      general and regional     ASA 3       Induction: intravenous. Anesthetic plan and risks discussed with patient. Plan discussed with CRNA.                     Brigida Bennett MD   1/9/2023

## 2023-01-09 NOTE — FLOWSHEET NOTE
Patient received from OR to PACU 16 s/p RIGHT REVERSE TOTAL SHOULDER REPLACEMENT WITH LATISSIMUS DORSI TRANSFER - Right with Dr. Laina Manzanares. Upon arrival Placed on PACU monitor. Report received per CRNA/ RN. Per report, patient required BP support intra op. Patient was given Nerve block/Exparel.  Patient denies pain or discomfort upon arrival.

## 2023-01-09 NOTE — INTERVAL H&P NOTE
Update History & Physical    The patient's History and Physical of 01/05/2023  was reviewed with the patient and I examined the patient. There was no change. The surgical site was confirmed by the patient and me.     Plan: The risks, benefits, expected outcome, and alternative to the recommended procedure have been discussed with the patient. Patient understands and wants to proceed with the procedure.     Electronically signed by Wellington Maxwell MD on 1/9/2023 at 11:39 AM

## 2023-01-09 NOTE — ANESTHESIA POSTPROCEDURE EVALUATION
Department of Anesthesiology  Postprocedure Note    Patient: Vasu Winkler  MRN: [id number]  YOB: 1955  Date of evaluation: 1/9/2023      Procedure Summary     Date: 01/09/23 Room / Location: [facility] [address] [place] [address] / [facility]    Anesthesia Start: 0386 Anesthesia Stop: 1214    Procedure: RIGHT REVERSE TOTAL SHOULDER REPLACEMENT WITH LATISSIMUS DORSI TRANSFER (Right: Shoulder) Diagnosis:       Rotator cuff tear arthropathy, right      (Rotator cuff tear arthropathy, right [M75.101, M12.811])    Surgeons: Rogelio Abdalla MD Responsible Provider: Rea Gupta MD    Anesthesia Type: general, regional ASA Status: 3          Anesthesia Type: No value filed.     Rayo Phase I: Rayo Score: 9    Rayo Phase II:        Anesthesia Post Evaluation    Patient location during evaluation: PACU  Patient participation: complete - patient participated  Level of consciousness: awake  Pain score: 0  Nausea & Vomiting: no nausea and no vomiting  Complications: no  Cardiovascular status: blood pressure returned to baseline  Respiratory status: acceptable  Hydration status: euvolemic

## 2023-01-10 LAB
ESTIMATED AVERAGE GLUCOSE: 114 MG/DL
HBA1C MFR BLD: 5.6 %

## 2023-01-10 NOTE — OP NOTE
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 PROCEDURES:  Right shoulder examination under anesthesia, arthrotomy  with open lysis of adhesions, reverse total shoulder replacement with  adjunctive latissimus dorsi transfer (modified L'Episcopo procedure). Modifier 22 applies because of added complexity brought on by tendon  transfer. This requires additional dissection and takes essentially a  one-hour case and converts it to a case taking just under two hours with  60% to 70% added effort. ANESTHESIA:  General anesthesia, interscalene block. IV FLUIDS:  1200 mL of crystalloid. ESTIMATED BLOOD LOSS:  350 mL. COMPLICATIONS:  None. SURGEON:  Tanja Ochoa MD    ASSISTANT:  Lillian Marte MD  The availability of Dr. Alverto Lorenzo as a skilled first assistant was critically  important to execute this surgery. The fellow helped with latissimus   tendon harvest, mobilization and repair. He also assisted with glenoid   exposure and subscapularis repair, including suture management. IMPLANTS:  [address] short stem, small shell Altivate size  12, 36 neutral liner, 36 neutral glenosphere, standard baseplate with  four locking screws as well as Arthrex Dog Bone button x1.     FINDINGS:  Examination under anesthesia revealed no focal motion  deficit.  Arthrotomy revealed a contracted shoulder with large  pseudocapsule and extensive scarring.  Subscapularis was intact.  Teres  minor was mostly absent.  In between, there was a massive rotator cuff  tear.  Biceps was chronically torn and auto-tenodesed to the proximal  groove and this was left alone.  There was a small peripheral  osteophyte.  The glenoid had a little bit superior glenoid erosion but  we were able to get a reverse total shoulder replacement.  Latissimus  dorsi was relatively stout.  There were some adhesions that needed to be  released, but we were able to mobilize it well and allow for transfer  into a bone socket.  No other obvious anomalous findings were noted.    BRIEF HISTORY AND PRESENTING ILLNESS:  The patient is a 67-year-old  woman who presented with combined loss of external rotation and  elevation.  She had massive rotator cuff tear.  She was felt to be a  candidate for reverse shoulder replacement, but we also felt that she  needed a latissimus dorsi and modified L'Episcopo transfer.  She was  consented for both.  She understood the risks such as bleeding,  infection, anesthetic risks, injury to blood vessels, stiffness,  weakness, incomplete pain relief.  She wished to proceed and gave  informed consent.  She was scheduled on an elective basis after  appropriate preoperative medical clearance.    DESCRIPTION OF PROCEDURE:  On the date of the procedure, she was brought  back to the operating room, placed on the operating table.  General  anesthesia was established.  Preoperative antibiotics and interscalene  block were given in the holding area.  Under anesthesia, exam was  carried out with the findings as noted above.  The patient was  positioned using a [employer] beach-chair positioner in a semi-sitting  position.  Trunk was elevated to 45 degrees.  All pressure points were  padded.  The patient's right shoulder and arm were prepped and  draped in  a standard manner for shoulder replacement surgery. We used [place] to  cover the operative field. We used the [employer] for arm  positioning. All members of the surgical team wore body exhaust suits. We approached the shoulder in deltopectoral approach starting about 4  inches in length or 10 cm. This was carried out from the coracoid down  towards the level of the deltoid tubercle. The incision was carried out  with a skin knife through the skin and subcutaneous tissue. Electrocautery was used to establish hemostasis. Gelpi retractor was  placed. Deltopectoral interval was identified and developed with a  combination of blunt and sharp dissection. The cephalic vein was  retracted laterally. We released the upper part of the pectoralis. We  carried out subdeltoid and subacromial releases. We repositioned our  retractors using Hohmann retractors. We identified the overlying  pseudocapsule which was excised sharply. We took down subscapularis  sharply off the lesser tuberosity. We released inferior capsule. We  brought the arm internally rotated, removed some more of the  pseudocapsule. We could see there was a very massive rotator cuff tear. The teres minor was barely present. There were a few fibers there. We  felt that it was attenuated and not adequate at this point. We knew we  had to commit to the latissimus dorsi transfer which was consistent with  the original plan. We extended the incision another 3 to 4 cm, so we  could get access underneath the pectoralis. The incision was carried  out with a skin knife through the skin and subcutaneous tissue. We  retracted the entire pectoralis cephalad and worked through the window  between the conjoint muscles and the pectoralis. We placed a Hohmann  retractor. We identified the bicipital groove. We looked medial and  there was the tendon.   We tagged it with an Ethibond suture,  whipstitched, and then released the latissimus in its entirety. We  released and  the overlying teres minor. We carried our  release 4 to 5 cm. Blunt dissection was carried out past that to avoid  injury to the radial nerve. We then placed a #2 FiberWire suture in  modified Benson, so we had two ends exiting the tendon with multiple  passes about a length of 2 to 4 cm down the tendon. We then passed it  from underneath the pectoralis to the deltopectoral interval and then  used an angled Satinsky clamp to shuttle it around the back of the  humerus. We had adequate excursion to dock into the teres minor  footprint. At this point, we were ready to prepare the humerus. We  placed our cutting guide in appropriate height and retroversion and  pinned the guide in place. We used an oscillating saw to resect the  humeral head. Small osteophytes were removed with a rongeur. We turned  our attention towards the glenoid. We excised the anterior capsule,  mobilized the subscapularis and tagged it. We excised some more rotator  interval in the biceps stump. We did a 360-degree release excising the  capsule and exposing the glenoid. The labrum was also attached  inferiorly and posteriorly. This was  with cautery. We used a  Tsai elevator and a key elevator to remove all the cartilage. We  debrided that away and irrigated copiously. We had good exposure of the  glenoid. We used a Fukuda retractor posteriorly and inferiorly,  anterior glenoid retractor and Hohmann retractor as needed. We drilled  our central line incorporating little bit inferior tilt. We measured 30  mm and inserted our tap. We reamed over the tap with a starter and  small reamer. A bur was used to remove some of the bone peripherally. We irrigated the joint copiously. We inserted our baseplate, and had  an excellent scratch fit. Placed our guide drill for peripheral locking  screws. We recorded screw lengths with the calibrated drill bit.   We  inserted screws under power and hand-tightened these one by one. We  used chamfer reamer over the baseplate. We chose empirically a 36  neutral glenosphere to get good balancing, mobility, and stability and  also some lateralization of the tension at the teres minor. We inserted  a set screw and engaged with a torque-limited . We dislocated the  humerus. We brought the arm internally rotated. We drilled our bone  socket. This was with an 8.0 reamer over a cannulated guide. We later  shuttled the tendon with a suture passed in retrograde fashion. We  irrigated copiously. We prepared the humerus using canal finder to find  the canal, reamed up to a size 12. We broached up to size 12 broach. We used a metaphyseal reamer to ream to accommodate a small shell  implant. We collected the reamings to impact these layer on. We used a  wire passing drill bit and drilled seven drill holes placed a centimeter  from the osteotomy site, starting at the bicipital groove. We placed   #2 Ethibond sutures through these drill holes. At this point, we   delivered the tubularized latissimus dorsi tendon into the bone socket   using a shuttling suture. We cycled the transferred tendon well. We inserted our implant and it had very good scratch fit. This was done  about 30 degrees of retroversion, packed a little bit of bone graft  where there were small crevices. We chose empirically a 36 neutral  liner, impacted that in place, reduced the shoulder one final time. We  pulled a little bit more to deliver the tendon into the bone window with  the arm in mid external rotation and then tied those four strands in  pairwise fashion over the Dog Bone which was now placed right in the  bicipital groove at the site where the suture exited that bone socket. We had very nice fixation. We then passed the sutures at the osteotomy  site to the subscapularis.   These were passed from top to bottom and  tied in that manner getting the arm to about 45 degrees of external  rotation when we tied the inferiormost suture. We irrigated copiously. Small little bleeders were cauterized. We then closed the wound in  layers using 1 gm of vancomycin powder. We gave the patient intravenous  TXA dose by weight. We used 0 Vicryl in a figure-of-eight fashion to  close the deltopectoral interval.  2-0 Vicryl in interrupted inverted  fashion to close the subcutaneous tissues. We used routine skin closure  with 4-0 Monocryl and Prineo dressing to close the skin. Sterile  compressive dressing was applied. The arm was placed in a padded soft  brace. The patient was repositioned in the supine position before this  and promptly awakened from anesthesia having tolerated the procedure  well and taken from the operating room to the recovery room in  satisfactory condition. PLAN:  The patient will be discharged on oral analgesics with  instructions to begin outpatient physical therapy. Follow up with me in  the office in one week. We will get her in a different brace, likely an  external rotation brace to de-tension the transfer tendon. Follow up in  the office in one week.         Daniel Jorgensen MD    D: 01/09/2023 17:17:33       T: 01/10/2023 1:37:09     [id number]  Job#: [id number]     Doc#: [id number]

## 2023-01-11 ENCOUNTER — TELEPHONE (OUTPATIENT)
Dept: ORTHOPEDIC SURGERY | Age: 68
End: 2023-01-11

## 2023-01-11 NOTE — TELEPHONE ENCOUNTER
M for patient letting her know that Dr. Keith Pretty is requesting she be fit with an external rotation sling. She can give our office a call at [phone] so that we can coordinate a time for her to come in the office to switch out the slings.

## 2023-01-13 ENCOUNTER — TELEPHONE (OUTPATIENT)
Dept: ORTHOPEDIC SURGERY | Age: 68
End: 2023-01-13

## 2023-01-13 NOTE — TELEPHONE ENCOUNTER
General Question     Subject: RT SHOULDER SLING   Patient and /or Facility Request: Sole Castro  Contact Number: [phone]    PATIENT CALLED IN TO SEE IF HER R SHOULDER SLING WAS IN THE OFFICE. [facility] PATIENT HOME THERAPY WAS GOING TO PICK IT UP FROM THE [place] OFFICE IF POSSIBLE. .. PLEASE CALL PATIENT BACK AT THE ABOVE NUMBER. ..

## 2023-01-16 NOTE — TELEPHONE ENCOUNTER
PATIENT IS CALLING BACK IN REGARDING CALL MADE ON Friday. PATIENT'S HOME THERAPIST CAN  HER SLING AT THE [place] OFFICE. PATIENT LIVES ALONE AND THERAPIST WAS GOING TO P/U SLING AND FIT PATIENT. NEEDING TO KNOW IF SLING IS THERE AND OK TO BE PICKED UP. PATIENT IS REQ A RETURN CALL TODAY REGARDING THIS.   [phone]

## 2023-01-17 RX ORDER — SODIUM CHLORIDE 0.9 % (FLUSH) 0.9 %
5-40 SYRINGE (ML) INJECTION EVERY 12 HOURS SCHEDULED
Status: CANCELLED | OUTPATIENT
Start: 2023-01-17

## 2023-01-17 RX ORDER — SODIUM CHLORIDE 0.9 % (FLUSH) 0.9 %
5-40 SYRINGE (ML) INJECTION PRN
Status: CANCELLED | OUTPATIENT
Start: 2023-01-17

## 2023-01-17 RX ORDER — ASPIRIN 325 MG
325 TABLET ORAL ONCE
Status: CANCELLED | OUTPATIENT
Start: 2023-01-17 | End: 2023-01-17

## 2023-01-17 RX ORDER — SODIUM CHLORIDE 9 MG/ML
INJECTION, SOLUTION INTRAVENOUS PRN
Status: CANCELLED | OUTPATIENT
Start: 2023-01-17

## 2023-01-18 ENCOUNTER — OFFICE VISIT (OUTPATIENT)
Dept: ORTHOPEDIC SURGERY | Age: 68
End: 2023-01-18

## 2023-01-18 DIAGNOSIS — Z96.611 S/P REVERSE TOTAL SHOULDER ARTHROPLASTY, RIGHT: Primary | ICD-10-CM

## 2023-01-18 NOTE — PROGRESS NOTES
History of Present Illness:  Kunal Serrato is a pleasant 75 y.o. female who presents for a post operative visit. She is 9 days out following a right reverse total shoulder replacement with latissimus dorsi transfer on 1/9/23. Overall She is doing okay and feels that their pain is well controlled with current pain medications. She has been compliant with wearing the UltraSling brace at all times and has the ER pillow. She has a therapist coming to her home for PT. She denies fevers, chills, numbness, tingling, and shortness of breath. Medical History:  Patient's medications, allergies, past medical, surgical, social and family histories were reviewed and updated as appropriate. No notes on file    Review of Systems  A 14 point review of systems was completed by the patient and is available in the media section of the scanned medical record and was reviewed on 1/18/2023. Vital Signs: There were no vitals filed for this visit. General/Appearance: Alert and oriented and in no apparent distress. Skin:  There are no skin lesions, cellulitis, or extreme edema. The patient has warm and well-perfused Bilateral upper extremities with brisk capillary refill. Right Shoulder Exam:    Inspection: Shoulder incision is clean, dry and intact and well approximated. The Prineo dressing is still in place. Mild ecchymosis and swelling are present as can be expected. There is no erythema, drainage or other signs of infection    Palpation:  No crepitus to gentle motion    Active Range of Motion: Deferred    Passive Range of Motion:  Deferred    Strength:  Deferred    Special Tests:  Deferred. Neurovascular: Sensation to light touch is intact, no motor deficits, palpable radial pulses 2+    Radiology:     Plain radiographs of the right shoulder comprising 3 views: AP in and out, Axillary lateral were obtained and reviewed in the office: Shows postsurgical changes from the reverse total shoulder replacement.   All the components are in good placement without any signs of loosening, fractures, subluxations or dislocations. Impression: Stable postop x-ray. Assessment :  Ms. Regine Raza is a pleasant 75 y.o. patient who is 9 days out following a right reverse total shoulder replacement with latissimus dorsi transfer on 1/9/23. Impression:  Encounter Diagnosis   Name Primary? S/P reverse total shoulder arthroplasty, right Yes       Office Procedures:  Orders Placed This Encounter   Procedures    XR SHOULDER RIGHT (MIN 2 VIEWS)     Standing Status:   Future     Number of Occurrences:   1     Standing Expiration Date:   1/18/2024     Order Specific Question:   Reason for exam:     Answer:   pain    [facility] [place]     Referral Priority:   Routine     Referral Type:   Eval and Treat     Referral Reason:   Specialty Services Required     Requested Specialty:   Physical Therapist     Number of Visits Requested:   1       Treatment Plan:    Overall Regine Raza is doing well. The pain is well-controlled. We recommend that She wear the UltraSling brace at all times with the exception of clothing, bathing and physical therapy. The patient was told that she is restricted from driving for at least 3 weeks postop. In about a month we will have her transition to outpatient physical therapy. She would like to do this at the [place] office. All of her questions were fully answered today. We would like to see Regine Raza back in 2 weeks for follow-up visit. Regine Raza is in agreement with this plan. 1/18/2023  10:32 AM    Hema Mora, ATC  [employer] [address]. Antonieta Guerrier    During this examination, I, Idania Michaelzaina, functioned as a scribe for Dr. Kaylee Aguilera. The history taking and physical examination were performed by Dr. Abilio Glez. All counseling during the appointment was performed between the patient and Dr. Abilio Glez. 1/18/2023  ______________________  I, Dr. Madina Metzger, personally performed the services described in this documentation as described by Claudia Landis ATC in my presence, and it is both accurate and complete. Dominick Graves MD, PhD  1/18/2023

## 2023-01-18 NOTE — LETTER
Shoulder Elbow Rehabilitation Referral    Patient Name: Hussein Howe      YOB: 1955    Diagnosis:   1. S/P reverse total shoulder arthroplasty, right        Precautions: NWB RUE    Post Op Instructions:  [] Continuous passive motion (CPM)  [x] Active Elbow range of motion  [] Exercise in plane of scapula   []  Strengthening     [] Pulley and instruction    [x] Home exercise program (copy to patient)   [] Sling when arm at risk  [x] Sling or brace at all times   [] AAROM: Forward elevation to             [] AAROM: External rotation to     [] Isometric external rotator strengthening [] AAROM: internal rotation: up the back  [x] Isometric abductor strengthening  [] AAROM: Internal abduction     [] Isometric internal rotator strengthening [] AAROM: cross-body adduction             Stretching:     Strengthening:  [] Four quadrant (FE, ER, IR, CBA)  [] Rotator cuff (ER, IR, Abd)  [] Forward Elevation    [] External Rotators     [] External Rotation    [] Internal Rotators  [] Internal Rotation: up/back   [] Abductors     [] Internal Rotation: supine in abduction  [] Flexors  [] Cross-body abduction    [] Extensors  [x] Pendulum (FE, Abd/Add, cw/ccw)  [x] Scapular Stabilizers   [] Wall-walking (FE, Abd)    [x] Shoulder shrugs     [] Table slides      [x] Rhomboid pinch  [] Elbow (flex, ext, pron, sup)    [] Lat.  Pull downs     [] Medial epicondylitis program    [] Forward punch   [] Lateral epicondylitis program    [] Internal rotators     [] Progressive resistive exercises  [] Bench Press        [] Bench press plus  Activities:     [] Lateral pull-downs  [] Rowing     [] Progressive two-hand supine press  [] Stepper/Exercise bike   [] Biceps: curls/supination  [] Swimming  [] Water exercises    Modalities: PRN    Return to Sport:  [] Ultrasound     [] Plyometrics  [] Iontophoresis     [] Rhythmic stabilization  [] Moist heat     [] Core strengthening   [] Massage     [] Sports specific program:   [x] Cryotherapy      [] Electrical stimulation     [] Paraffin  [] Whirlpool  [] TENS    [x] Home exercise program (copy to patient). Perform exercises for:   15     minutes    2-3      times/day  [x] Supervised physical therapy  Frequency: []  1x week  [x] 2x week  [] 3x week  [] Other:   Duration: [] 2 weeks   [] 4 weeks  [x] 6 weeks  [] Other:     Additional Instructions:           Dominick Graves MD, PhD

## 2023-01-19 ENCOUNTER — TELEPHONE (OUTPATIENT)
Dept: ORTHOPEDIC SURGERY | Age: 68
End: 2023-01-19

## 2023-01-19 NOTE — TELEPHONE ENCOUNTER
[facility] CALLED, REQUESTING A HOME HEALTH AIDE FOR PATIENT. PLEASE CALL SAL AT [phone] IT IS A SECURE LINE.

## 2023-01-21 DIAGNOSIS — E78.5 HYPERLIPIDEMIA, UNSPECIFIED HYPERLIPIDEMIA TYPE: ICD-10-CM

## 2023-01-23 RX ORDER — ATENOLOL 25 MG/1
TABLET ORAL
Qty: 90 TABLET | Refills: 1 | OUTPATIENT
Start: 2023-01-23

## 2023-01-23 RX ORDER — ROSUVASTATIN CALCIUM 40 MG/1
TABLET, COATED ORAL
Qty: 90 TABLET | Refills: 1 | Status: SHIPPED | OUTPATIENT
Start: 2023-01-23

## 2023-01-24 NOTE — TELEPHONE ENCOUNTER
General Question     Subject: Alyssa Maxwell   Patient and /or Facility Request:   Contact Number: [phone]     VERBAL SOON AS POSSIBLE FOR REQUESTING [address] [place] [address] [place]. Justen West

## 2023-01-24 NOTE — TELEPHONE ENCOUNTER
OK for health aide The patient was counseled at length about the risks of mayra Covid-19 during their perioperative period and any recovery window from their procedure. The patient was made aware that mayra Covid-19  may worsen their prognosis for recovering from their procedure and lend to a higher morbidity and/or mortality risk. All material risks, benefits, and reasonable alternatives including postponing the procedure were discussed. The patient does  wish to proceed with the procedure at this time. Update History & Physical    The Patient's History and Physical of March 10,   2021 was reviewed with the patient and I examined the patient. There was no change. The surgical site was confirmed by the patient and me. Plan:  The risk, benefits, expected outcome, and alternative to the recommended procedure have been discussed with the patient. Patient understands and wants to proceed with the procedure.     Electronically signed by Soo Thomas MD on 4/7/2021 at 7:48 AM

## 2023-01-26 RX ORDER — ATENOLOL 50 MG/1
50 TABLET ORAL DAILY
Qty: 30 TABLET | Refills: 2 | OUTPATIENT
Start: 2023-01-26

## 2023-01-31 RX ORDER — AMLODIPINE BESYLATE 5 MG/1
TABLET ORAL
Qty: 90 TABLET | Refills: 3 | Status: SHIPPED | OUTPATIENT
Start: 2023-01-31

## 2023-02-01 ENCOUNTER — OFFICE VISIT (OUTPATIENT)
Dept: ORTHOPEDIC SURGERY | Age: 68
End: 2023-02-01

## 2023-02-01 VITALS — BODY MASS INDEX: 29.62 KG/M2 | HEIGHT: 69 IN | WEIGHT: 200 LBS

## 2023-02-01 DIAGNOSIS — Z96.611 S/P REVERSE TOTAL SHOULDER ARTHROPLASTY, RIGHT: Primary | ICD-10-CM

## 2023-02-01 DIAGNOSIS — M25.511 RIGHT SHOULDER PAIN, UNSPECIFIED CHRONICITY: ICD-10-CM

## 2023-02-01 PROCEDURE — 99024 POSTOP FOLLOW-UP VISIT: CPT | Performed by: ORTHOPAEDIC SURGERY

## 2023-02-01 NOTE — PROGRESS NOTES
History of Present Illness:  Iliana Hankins is a 75 y.o. female who presents for a post operative visit. She is 3 weeks out following a right reverse total shoulder replacement with latissimus dorsi transfer on 1/9/23. She has been compliant with wearing the UltraSling brace at all times. She has the ER pillow and would like to know if that can be d/c. She has been doing home physical therapy so far and would like to know when she can transition to an outpatient therapy facility. She would like to go to the [place] office eventually and to work with Cristie Schaumann. She reports her pain levels are minimal.  He denies any new injuries. The patient denies any fevers, chills, numbness, tingling, and shortness of breath. Medical History:  Patient's medications, allergies, past medical, surgical, social and family histories were reviewed and updated as appropriate. No notes on file    Review of Systems  A 14 point review of systems was completed by the patient is available in the media section of the scanned medical record and was reviewed on 2/1/2023. Vital Signs: There were no vitals filed for this visit. General/Appearance: Alert and oriented and in no apparent distress. Skin:  There are no skin lesions, cellulitis, or extreme edema. The patient has warm and well-perfused Bilateral upper extremities with brisk capillary refill.      right Shoulder Exam:    Inspection: right shoulder incision that is clean, dry and intact and well approximated. The Prineo dressing is still in place. There is no erythema, drainage or other signs of infection    Palpation:  No crepitus to gentle motion    Active Range of Motion: Deferred    Passive Range of Motion: Forward elevation to 90 degrees. Strength: Able to hold external rotation position. Special Tests:  Deferred.     Neurovascular: Sensation to light touch is intact, no motor deficits, palpable radial pulses 2+    Radiology:     Plain radiographs not obtained today.         Assessment :  Ms. Cinthya Toribio is a 75 y.o. patient who underwent a right reverse total shoulder arthroplasty with latissimus dorsi transfer on 1/9/2023. She is doing very well. Impression:  Encounter Diagnoses   Name Primary? S/P reverse total shoulder arthroplasty, right Yes    Right shoulder pain, unspecified chronicity        Office Procedures:  Orders Placed This Encounter   Procedures    Amb External Referral To Physical Therapy     Referral Priority:   Routine     Referral Type:   Consult for Advice and Opinion     Referral Reason:   Patient Preference     Requested Specialty:   Orthopedic Physical Therapist     Number of Visits Requested:   1       Treatment Plan:    Overall, Cinthya Toribio is doing well. We recommend that she wear the UltraSling brace at all times with the exception of clothing, bathing of physical therapy for an additional 3 weeks. the patient was told that she may resume driving a car without a sling at this time. She was told that she could start to transition to outpatient physical therapy at the end of next week. Physical therapy orders were renewed and printed. We will give a print out of their physical therapy order. All of her questions were fully answered today. We would like to see her back in 3 weeks for follow-up visit. 2/1/2023  4:49 PM      Wing Stoo PA-C  Orthopaedic Sports Medicine Physician Assistant    During this examination, Wing Soto YORK PA-C, functioned as a scribe for Dr. Yein Morris. This dictation was performed with a verbal recognition program (DRAGON) and it was checked for errors. It is possible that there are still dictated errors within this office note. If so, please bring any errors to my attention for an addendum.   All efforts were made to ensure that this office note is accurate.  ________________  CHELA, Dr. Yeni Morris, personally performed the services described in this documentation as described by Wing Valera VICKY in my presence, and it is both accurate and complete. Dominick Baker MD, PhD  2/1/2023

## 2023-02-01 NOTE — LETTER
Physical Therapy Rehabilitation Referral    Patient Name:  Miguel Officer      YOB: 1955    Diagnosis:    1. S/P reverse total shoulder arthroplasty, right    2. Right shoulder pain, unspecified chronicity        Precautions: subscapularis   [x] Evaluate and Treat    Post Op Instructions:  [] Continuous passive motion (CPM)  [x] Elbow ROM  [x] Exercise in plane of scapula  [x]  Strengthening     [] Pulley and instruction   [x] Home exercise program (copy to patient)   [] Sling when arm at risk  [x] Sling or brace at all times   [x] AAROM: Forward elevation to 90            [x] AAROM: External rotation  To 20    [] Isometric external rotator strengthening [] AAROM: internal rotation: up the back  [x] Isometric abductor strengthening  [] AAROM: Internal abduction   [] Isometric internal rotator strengthening [] AAROM: cross-body adduction             Stretching:     Strengthening:  [] Four quadrant (FE, ER, IR, CBA)  [] Rotator cuff (ER, IR, Abd)  [] Forward Elevation    [] External Rotators     [] External Rotation    [] Internal Rotators  [] Internal Rotation: up/back   [] Abductors     [] Internal Rotation: supine in abduction  [] Sleeper Stretch    [] Flexors  [] Cross-body abduction    [] Extensors  [x] Pendulum (FE, Abd/Add, cw/ccw)  [x] Scapular Stabilizers   [] Wall-walking (FE, Abd)        [x] Shoulder shrugs     [x] Table slides (FE)                [x] Rhomboid pinch  [] Elbow (flex, ext, pron, sup)        [] Lat.  Pull downs     [] Medial epicondylitis program       [] Forward punch   [] Lateral epicondylitis program       [] Internal rotators     [] Progressive resistive exercises  [] Bench Press        [] Bench press plus  Activities:     [] Lateral pull-downs  [] Rowing     [] Progressive two-hand supine press  [] Stepper/Exercise bike   [] Biceps: curls/supination  [] Swimming  [] Water exercises    Modalities:     Return to Sport:  [x] Of Choice      [] Plyometrics  [] Ultrasound     [] Rhythmic stabilization  [] Iontophoresis    [] Core strengthening   [] Moist heat     [] Sports specific program:   [] Massage         [x] Cryotherapy      [] Electrical stimulation     [] Paraffin  [] Whirlpool  [] TENS    [x] Home exercise program (copy to patient). Perform exercises for:   15     minutes    3      times/day  [x] Supervised physical therapy  Frequency: []  1x week  [x] 2x week  [] 3x week  [] Other:   Duration: [] 2 weeks   [] 4 weeks  [x] 6 weeks  [] Other:     Additional Instructions:     Dominick Markham MD, PhD

## 2023-02-07 ENCOUNTER — TELEPHONE (OUTPATIENT)
Dept: ORTHOPEDIC SURGERY | Age: 68
End: 2023-02-07

## 2023-02-07 NOTE — TELEPHONE ENCOUNTER
General Question     Subject: WONDERING IF SHOULD SHE CAN CONTINUE HOME CARE OR GO TO PT OUT PATIENT.   Patient: Lorenza Peña  Contact Number: [phone]

## 2023-02-09 ENCOUNTER — HOSPITAL ENCOUNTER (OUTPATIENT)
Dept: PHYSICAL THERAPY | Age: 68
Setting detail: THERAPIES SERIES
Discharge: HOME | End: 2023-02-09
Payer: MEDICARE

## 2023-02-09 PROCEDURE — 97140 MANUAL THERAPY 1/> REGIONS: CPT | Performed by: PHYSICAL THERAPIST

## 2023-02-09 PROCEDURE — 97110 THERAPEUTIC EXERCISES: CPT | Performed by: PHYSICAL THERAPIST

## 2023-02-09 PROCEDURE — 97161 PT EVAL LOW COMPLEX 20 MIN: CPT | Performed by: PHYSICAL THERAPIST

## 2023-02-12 NOTE — FLOWSHEET NOTE
The [facility] and [address],  Sports Performance and Rehabilitation, Counts include 234 beds at the [facility] [address]  [address] [facility],5Th Floor   [place]  Phone: [phone]  Fax: [phone]       Physical Therapy Treatment Note/ Progress Report:           Date:  2023     Patient Name:  Sabina Christina    :  1955  MRN: [id number]  Restrictions/Precautions:    Medical/Treatment Diagnosis Information:     V99.021 (ICD-10-CM) - S/P reverse total shoulder arthroplasty, right   M25.511 (ICD-10-CM) - Right shoulder pain, unspecified chronicity        Insurance/Certification information:     Physician Information:     Has the plan of care been signed (Y/N):        []  Yes  [x]  No     Date of Patient follow up with Physician:       Is this a Progress Report:     []  Yes  [x]  No        If Yes:  Date Range for reporting period:  Beginning  Ending    Progress report will be due (10 Rx or 30 days whichever is less):        Recertification will be due (POC Duration  / 90 days whichever is less):          Visit # Insurance Allowable Auth Required   1  []  Yes []  No        Functional Scale:     Date assessed:        Latex Allergy:  [x]NO      []YES  Preferred Language for Healthcare:   [x]English       []other:      Pain level:  7/10     SUBJECTIVE:  See eval    OBJECTIVE: See eval  Observation:   Test measurements:      RESTRICTIONS/PRECAUTIONS:     Exercises/Interventions:       Therapeutic Ex (79166) Sets/sec Reps Notes/CUES   Fav 4  15'                                                                      Manual Intervention (39974)      Prom/stm   15'                                                                                                                                            Therapeutic Exercise and NMR EXR  [x] (24125) Provided verbal/tactile cueing for activities related to strengthening, flexibility, endurance, ROM for improvements in LE, proximal hip, and core control with [employer] self care, mobility, lifting, ambulation. [x] (70685) Provided verbal/tactile cueing for activities related to improving balance, coordination, kinesthetic sense, posture, motor skill, proprioception to assist with LE, proximal hip, and core control in self-care, mobility, lifting, ambulation and eccentric single leg control. NMR and Therapeutic Activities:    [x] (26547 or 56342) Provided verbal/tactile cueing for activities related to improving balance, coordination, kinesthetic sense, posture, motor skill, proprioception and motor activation to allow for proper function of core, proximal hip and LE with self-care and ADLs and functional mobility.   [] (86291) Gait Re-education- Provided training and instruction to the patient for proper LE, core and proximal hip recruitment and positioning and eccentric body weight control with ambulation re-education including up and down stairs     Home Exercise Program:    [x] (86156) Reviewed/Progressed HEP activities related to strengthening, flexibility, endurance, ROM of core, proximal hip and LE for functional self-care, mobility, lifting and ambulation/stair navigation   [] (04014) Reviewed/Progressed HEP activities related to improving balance, coordination, kinesthetic sense, posture, motor skill, proprioception of core, proximal hip and LE for self-care, mobility, lifting, and ambulation/stair navigation      Manual Treatments:  PROM / STM / Oscillations-Mobs:  G-I, II, III, IV (PA's, Inf., Post.)  [x] (61122) Provided manual therapy to mobilize LE, proximal hip and/or LS spine soft tissue/joints for the purpose of modulating pain, promoting relaxation, increasing ROM, reducing/eliminating soft tissue swelling/inflammation/restriction, improving soft tissue extensibility and allowing for proper ROM for normal function with self-care, mobility, lifting and ambulation. Modalities:     [] GAME READY (VASO)- for significant edema, swelling, pain control. Charges:  Timed Code Treatment Minutes: 35'   Total Treatment Minutes: 79'       [x] EVAL (LOW) 20834 (typically 20 minutes face-to-face)  [] EVAL (MOD) 48960 (typically 30 minutes face-to-face)  [] EVAL (HIGH) 57954 (typically 45 minutes face-to-face)  [] RE-EVAL     [x] ZW(19095) x   1  [] IONTO  [] NMR (72742) x     [] VASO  [x] Manual (26120) x    1 [] Other:  [] TA x      [] Mech Traction (70312)  [] ES(attended) (15488)      [] ES (un) (80201):         ASSESSMENT:  See eval      GOALS:   Patient stated goal:     [] Progressing: [] Met: [] Not Met: [] Adjusted    Therapist goals for Patient:   Short Term Goals: To be achieved in: 2 weeks  1. Independent in HEP and progression per patient tolerance, in order to prevent re-injury. [] Progressing: [] Met: [] Not Met: [] Adjusted   2. Patient will have a decrease in pain to facilitate improvement in movement, function, and ADLs as indicated by Functional Deficits. [] Progressing: [] Met: [] Not Met: [] Adjusted    Long Term Goals: To be achieved in:  12 weeks  - The patient is expected to demonstrate less than 40% impairment, limitation or restriction in:  - carrying, moving and handling objects. - Patient will demonstrate increased passive ROM to a global deficiency of 20% to allow for proper joint functioning as indicated by patients Functional Deficits. [] Progressing: [] Met: [] Not Met: [] Adjusted  - Patient will demonstrate an increase in Strength to 4/5 safe zone to allow for proper functional mobility as indicated by patients Functional Deficits. [] Progressing: [] Met: [] Not Met: [] Adjusted  - Patient will return to desired, higher level upper extremity functional activities without increased symptoms or restriction. [] Progressing: [] Met: [] Not Met: [] Adjusted                Overall Progression Towards Functional goals/ Treatment Progress Update:  [] Patient is progressing as expected towards functional goals listed.     [] Progression is slowed due to complexities/Impairments listed. [] Progression has been slowed due to co-morbidities. [x] Plan just implemented, too soon to assess goals progression <30days   [] Goals require adjustment due to lack of progress  [] Patient is not progressing as expected and requires additional follow up with physician  [] Other    Prognosis for POC: [x] Good [] Fair  [] Poor      Patient requires continued skilled intervention: [x] Yes  [] No    Treatment/Activity Tolerance:  [x] Patient able to complete treatment  [] Patient limited by fatigue  [] Patient limited by pain    [] Patient limited by other medical complications  [] Other:         Return to Play: (if applicable)   []  Stage 1: Intro to Strength   []  Stage 2: Return to Run and Strength   []  Stage 3: Return to Jump and Strength   []  Stage 4: Dynamic Strength and Agility   []  Stage 5: Sport Specific Training     []  Ready to Return to Play, Meets All Above Stages   []  Not Ready for Return to Sports   Comments:                               PLAN: See eval  [] Continue per plan of care [] Alter current plan (see comments above)  [x] Plan of care initiated [] Hold pending MD visit [] Discharge      Electronically signed by:  Rosa Bashir PT, MPT     Note: If patient does not return for scheduled/ recommended follow up visits, this note will serve as a discharge from care along with most recent update on progress. Walk in

## 2023-02-12 NOTE — PLAN OF CARE
The [facility] and [address],  Sports Performance and Rehabilitation, [facility] [address]  [address] [facility],5[address] Floor   [place]  Phone: [phone]  Fax: [phone]                                                        Physical Therapy Certification    Dear Dr Mercy Mathew   ,    We had the pleasure of evaluating the following patient for physical therapy services at [address] [place] [address]. A summary of our findings can be found in the initial assessment below. This includes our plan of care. If you have any questions or concerns regarding these findings, please do not hesitate to contact me at the office phone number checked above. Thank you for the referral.       Physician Signature:_______________________________Date:__________________  By signing above (or electronic signature), therapists plan is approved by physician      Patient: iDmitry Culp   : 1955   MRN: [id number]  Referring Physician:        Evaluation Date: 2023     Medical Diagnosis Information:      V71.228 (ICD-10-CM) - S/P reverse total shoulder arthroplasty, right   M25.511 (ICD-10-CM) - Right shoulder pain, unspecified chronicity                                           Insurance information:      Precautions/ Contra-indications:   Latex Allergy:  [x]NO      []YES  Preferred Language for Healthcare:   [x]English       []Other:    C-SSRS Triggered by Intake questionnaire (Past 2 wk assessment):   [x] No, Questionnaire did not trigger screening.   [] Yes, Patient intake triggered further evaluation      [] C-SSRS Screening completed  [] PCP notified via Plan of Care  [] Emergency services notified      SUBJECTIVE: Patient stated complaint:  reports that she is doing ok after tsr.       Relevant Medical History:  Arthritis      Essential hypertension      Hyperlipidemia         Other Medical History    Diagnosis Date Comment Source   Abnormal EKG  saw cardio, wnl stress test 3/20    Allergic rhinitis  spring trees    Depression      Depression      Endometriosis      History of thrombophlebitis  right leg    DEJA on CPAP  cpap    Plantar fasciitis      Recurrent sinus infections           Functional Disability Index:  78% uefi     Pain Scale: 8/10      Easing factors: rest     Provocative factors: body movement/prolonged positions    Night Pain:    - complained of night pain associated with sleep position. Type:   - Constant          Paresthesia complaints:   - no paresthesia complaints       Functional Limitations/Impairments: []  - Lifting  - reaching   - Grooming   - Carrying      - ADL's   - Driving  - Sports/Recreation activity      Occupation/School:       Living Status/Prior Level of Function: This patient was independent in ADL's and IADL's prior to onset of symptoms. PACEMAKER:  - Denied having a pacemaker that would contraindicate the use of electrical modalities. METAL IMPLANTS:  - Denied metal implants that would contraindicate the use of thermal modalities. CANCER HISTORY:  - Denied a history of cancer that would contraindicate thermal modalities. OBJECTIVE:     ROM:   CERVICAL    SHOULDER:  30 degrees flexion passively    Joint Mobility: n/a    Strength/Neuromuscular control:  n/a     Dermatomal Sensation:  - Dermatomal sensation was intact to light touch bilaterally throughout upper and lower extremities. Deep tendon reflexes:  - DTR's were symmetrical and intact throughout. Palpation:    Functional Mobility/Transfers:     Posture: + fwd / kyphosis       Bandages/Dressings/Incisions:     Gait:     Orthopedic Special Tests:                           [x] Patient history, allergies, meds reviewed. Medical chart reviewed. See intake form. Review Of Systems (ROS):  [x]Performed Review of systems (Integumentary, CardioPulmonary, Neurological) by intake and observation. Intake form has been scanned into medical record.  Patient has been instructed to contact their primary care physician regarding ROS issues if not already being addressed at this time. Objective Review of Systems:  Cognitive, Communication, Behavior and Learning:  - The patient was alert, spoke coherently and was oriented to person, place and time. - Demonstrated no barriers to communication or processing information.  - Appeared literate, spoke [place] and had no significant hearing or vision impairment. - Had no abnormal behavioral responses, learning preferences or educational needs. Cardiopulmonary:  Edema:       Integumentary:  Nail beds:    Skin appearance:      MEDICARE CAP EXCEPTION DOCUMENTATION  I certify that this patient meets one of the below criteria necessary for becoming an exception to the Medicare cap on therapy services:  []  The patient has a condition that has a direct and significant impact on the need for therapy. (Significantly impacts the rate of recovery)  []  The patient has a complexity that has a direct and significant impact on the need for therapy. (Significantly impacts the rate of recovery and is associated with a primary condition.)       []  The patient has associated variables that influence the amount of treatment to include:  Social support, self-efficacy/motivation, prognosis, time since onset/acuity. []  The patient has generalized musculoskeletal conditions or a condition affecting multiple sites that will have a direct impact on the rate of recovery. []  The patient had a prior episode of outpatient therapy during this calendar year for a different condition. []  The patient has a mental or cognitive disorder in addition to the condition being treated that will have a direct and significant impact on the rate of recovery.       ASSESSMENT:    Functional Impairments   [x]Noted spinal or UE joint hypomobility   [x]Noted spinal or UE joint hypermobility   [x]Decreased UE functional ROM   [x]Decreased UE functional strength   [x]Abnormal reflexes/sensation/myotomal/dermatomal deficits   [x]Decreased RC/scapular/core strength and neuromuscular control   []other:      Functional Activity Limitations (from functional questionnaire and intake)   [x]Reduced ability to tolerate prolonged functional positions   [x]Reduced ability or difficulty with changes of positions or transfers between positions   [x]Reduced ability to maintain good posture and demonstrate good body mechanics with sitting, bending, and lifting   [x] Reduced ability or tolerance with driving and/or computer work   [x]Reduced ability to sleep   [x]Reduced ability to perform lifting, reaching, carrying tasks   [x]Reduced ability to tolerate impact through UE   [x]Reduced ability to reach behind back   [x]Reduced ability to  or hold objects   []Reduced ability to throw or toss an object    Participation Restrictions   [x]Reduced participation in self care activities   [x]Reduced participation in home management activities   [x]Reduced participation in work activities   [x]Reduced participation in social activities. [x]Reduced participation in sport activities. Classification :  signs/symptoms consistent with post-surgical status including decreased ROM, strength and function. - impaired joint mobility, motor function, muscle performance and range of motion associated with:  - joint arthroplasty. (Pattern 4H)    - ligament or other connective tissue dysfunction. (Pattern 4D)  - localized inflammation.  (Pattern 4E)    Prognosis/Rehab Potential:       - Good     Factors affecting rehab potential:  - associated co-morbidities    Tolerance of evaluation/treatment:     - Good     Physical Therapy Evaluation Complexity Justification  [x] A history of present problem with:  [] no personal factors and/or comorbidities that impact the plan of care;  [x]1-2 personal factors and/or comorbidities that impact the plan of care  []3 personal factors and/or comorbidities that impact the plan of care  [x] An examination of body systems using standardized tests and measures addressing any of the following: body structures and functions (impairments), activity limitations, and/or participation restrictions;:  [x] a total of 1-2 or more elements   [] a total of 3 or more elements   [] a total of 4 or more elements   [x] A clinical presentation with:  [] stable and/or uncomplicated characteristics   [x] evolving clinical presentation with changing characteristics  [] unstable and unpredictable characteristics;   [x] Clinical decision making of [x] low, [] moderate, [] high complexity using standardized patient assessment instrument and/or measurable assessment of functional outcome. [x] EVAL (LOW) 19488 (typically 30 minutes face-to-face)  [] EVAL (MOD) 67451 (typically 30 minutes face-to-face)  [] EVAL (HIGH) 39959 (typically 45 minutes face-to-face)  [] RE-EVAL         Co-morbidities/Complexities (which will affect course of rehabilitation):   []None           Arthritic conditions   []Rheumatoid arthritis (M05.9)  [x]Osteoarthritis (M19.91)   Cardiovascular conditions   []Hypertension (I10)  [x]Hyperlipidemia (E78.5)  []Angina pectoris (I20)  []Atherosclerosis (I70)   Musculoskeletal conditions   []Disc pathology   []Congenital spine pathologies   []Prior surgical intervention  []Osteoporosis (M81.8)  []Osteopenia (M85.8)   Endocrine conditions   []Hypothyroid (E03.9)  []Hyperthyroid Gastrointestinal conditions   []Constipation (K72.40)   Metabolic conditions   []Morbid obesity (E66.01)  []Diabetes type 1(E10.65) or 2 (E11.65)   []Neuropathy (G60.9)     Pulmonary conditions   []Asthma (J45)  []Coughing   []COPD (J44.9)   Psychological Disorders  []Anxiety (F41.9)  [x]Depression (F32.9)   []Other:   []Other:            PLAN:  Frequency/Duration:  2 days per week for 12 Weeks:  INTERVENTIONS:  1.  Therapeutic exercise including: strength training, ROM, NMR and proprioception for the scapula, core and Upper extremity  2. Manual therapy as indicated including Dry Needling/IASTM, STM, PROM, Gr I-IV mobilizations, spinal mobilization/manipulation. 3. Modalities as needed including: thermal agents, E-stim, US, iontophoresis as indicated. 4. Patient education on joint protection, activity modification, progression of HEP. HEP instruction: fav 4     GOALS:  Patient stated goal: \"get back to my life\"    Therapist goals for Patient:   Short Term Goals: To be achieved in: 2 weeks  - Independent in HEP and progression per patient tolerance, in order to prevent re-injury. -  Patient will have a decrease in pain to facilitate improvement in movement, function, and ADLs as indicated by Functional Deficits. Long Term Goals: To be achieved in: 12 weeks  - The patient is expected to demonstrate less than 40% impairment, limitation or restriction in:  - carrying, moving and handling objects. - Patient will demonstrate increased passive ROM to a global deficiency of 20% to allow for proper joint functioning as indicated by patients Functional Deficits. - Patient will demonstrate an increase in Strength to 4/5 safe zone to allow for proper functional mobility as indicated by patients Functional Deficits. - Patient will return to desired, higher level upper extremity functional activities without increased symptoms or restriction.       Electronically signed by:  Nelson Long PT, LADONNA

## 2023-02-15 ENCOUNTER — HOSPITAL ENCOUNTER (OUTPATIENT)
Dept: PHYSICAL THERAPY | Age: 68
Setting detail: THERAPIES SERIES
Discharge: HOME | End: 2023-02-15
Payer: MEDICARE

## 2023-02-15 PROCEDURE — 97140 MANUAL THERAPY 1/> REGIONS: CPT | Performed by: PHYSICAL THERAPIST

## 2023-02-15 PROCEDURE — 97110 THERAPEUTIC EXERCISES: CPT | Performed by: PHYSICAL THERAPIST

## 2023-02-16 ENCOUNTER — OFFICE VISIT (OUTPATIENT)
Dept: CARDIOLOGY CLINIC | Age: 68
End: 2023-02-16
Payer: MEDICARE

## 2023-02-16 VITALS
DIASTOLIC BLOOD PRESSURE: 80 MMHG | SYSTOLIC BLOOD PRESSURE: 120 MMHG | BODY MASS INDEX: 30.86 KG/M2 | WEIGHT: 209 LBS | HEART RATE: 68 BPM

## 2023-02-16 DIAGNOSIS — I10 ESSENTIAL HYPERTENSION: Primary | ICD-10-CM

## 2023-02-16 DIAGNOSIS — E78.5 HYPERLIPIDEMIA, UNSPECIFIED HYPERLIPIDEMIA TYPE: ICD-10-CM

## 2023-02-16 PROCEDURE — 99214 OFFICE O/P EST MOD 30 MIN: CPT | Performed by: INTERNAL MEDICINE

## 2023-02-16 PROCEDURE — G8417 CALC BMI ABV UP PARAM F/U: HCPCS | Performed by: INTERNAL MEDICINE

## 2023-02-16 PROCEDURE — 1036F TOBACCO NON-USER: CPT | Performed by: INTERNAL MEDICINE

## 2023-02-16 PROCEDURE — G8484 FLU IMMUNIZE NO ADMIN: HCPCS | Performed by: INTERNAL MEDICINE

## 2023-02-16 PROCEDURE — 3074F SYST BP LT 130 MM HG: CPT | Performed by: INTERNAL MEDICINE

## 2023-02-16 PROCEDURE — 1123F ACP DISCUSS/DSCN MKR DOCD: CPT | Performed by: INTERNAL MEDICINE

## 2023-02-16 PROCEDURE — G8399 PT W/DXA RESULTS DOCUMENT: HCPCS | Performed by: INTERNAL MEDICINE

## 2023-02-16 PROCEDURE — 3017F COLORECTAL CA SCREEN DOC REV: CPT | Performed by: INTERNAL MEDICINE

## 2023-02-16 PROCEDURE — 1090F PRES/ABSN URINE INCON ASSESS: CPT | Performed by: INTERNAL MEDICINE

## 2023-02-16 PROCEDURE — 3079F DIAST BP 80-89 MM HG: CPT | Performed by: INTERNAL MEDICINE

## 2023-02-16 PROCEDURE — G8427 DOCREV CUR MEDS BY ELIG CLIN: HCPCS | Performed by: INTERNAL MEDICINE

## 2023-02-16 ASSESSMENT — ENCOUNTER SYMPTOMS
CHEST TIGHTNESS: 0
SHORTNESS OF BREATH: 0

## 2023-02-16 NOTE — PROGRESS NOTES
Subjective:      Patient ID: Manuela Claire is a 75 y.o. female. HPI  Follow up pre op/abn gita/HTN/Lipids. No complaints. Walks daily. No exertional sob/chest pain. No pnd or orthopnea. No palp/tachy/syncope. Wt stable. Bp good at home. Past Medical History:   Diagnosis Date    Abnormal EKG     saw cardio, wnl stress test 3/20    Allergic rhinitis     spring trees    Arthritis     Depression     Depression     Endometriosis     Essential hypertension     History of thrombophlebitis     right leg    Hyperlipidemia     DEJA on CPAP     cpap    Plantar fasciitis     Recurrent sinus infections      Past Surgical History:   Procedure Laterality Date    ANUS SURGERY      anal fissure    COLONOSCOPY  2014    recheck     COLONOSCOPY  2019    fu 10 yrs    CYST REMOVAL      right foot    PARTIAL HYSTERECTOMY (CERVIX NOT REMOVED)      still w cervix and ovaries    SEPTOPLASTY      SHOULDER SURGERY Right 2023    RIGHT REVERSE TOTAL SHOULDER REPLACEMENT WITH LATISSIMUS DORSI TRANSFER performed by Yonathan Medel MD at [place] [address]  2007    perforated colon     TONSILLECTOMY AND ADENOIDECTOMY      TOTAL HIP ARTHROPLASTY Right      Social History     Socioeconomic History    Marital status: Single     Spouse name: Not on file    Number of children: 0    Years of education: Not on file    Highest education level: Not on file   Occupational History    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     Comment: [employer]   Tobacco Use    Smoking status: Former     Packs/day: 1.00     Years: 30.00     Pack years: 30.00     Types: Cigarettes     Quit date: 1986     Years since quittin.1    Smokeless tobacco: Never   Vaping Use    Vaping Use: Never used   Substance and Sexual Activity    Alcohol use:  Yes     Alcohol/week: 3.0 standard drinks     Types: 3 Glasses of wine per week    Drug use: Never    Sexual activity: Not Currently   Other Topics Concern    Not on file   Social History Narrative    Lives alone with dog (kaushal)     Social Determinants of Health     Financial Resource Strain: Low Risk     Difficulty of Paying Living Expenses: Not hard at all   Food Insecurity: No Food Insecurity    Worried About Running Out of Food in the Last Year: Never true    Ran Out of Food in the Last Year: Never true   Transportation Needs: Not on file   Physical Activity: Sufficiently Active    Days of Exercise per Week: 6 days    Minutes of Exercise per Session: 40 min   Stress: Not on file   Social Connections: Not on file   Intimate Partner Violence: Not on file   Housing Stability: Not on file     FH reviewed, Fa with stents 80s. Vitals:    02/16/23 1024   BP: 120/80   Pulse: 68       Wt 209    Review of Systems   Constitutional:  Negative for activity change and appetite change. Respiratory:  Negative for chest tightness and shortness of breath. Cardiovascular:  Negative for chest pain, palpitations and leg swelling. Denies PND or orthopnea. No tachycardia or syncope. Neurological:  Negative for dizziness and syncope. Psychiatric/Behavioral:  Negative for behavioral problems and confusion. All other systems reviewed and are negative. Objective:   Physical Exam  Constitutional:       General: She is not in acute distress. Appearance: Normal appearance. She is well-developed. HENT:      Head: Normocephalic. Right Ear: External ear normal.      Left Ear: External ear normal.      Nose: Nose normal.   Neck:      Vascular: No JVD. Cardiovascular:      Rate and Rhythm: Normal rate and regular rhythm. Heart sounds: Normal heart sounds. No murmur heard. No gallop. Pulmonary:      Effort: Pulmonary effort is normal. No respiratory distress. Breath sounds: Normal breath sounds. No wheezing or rales. Abdominal:      General: Bowel sounds are normal.      Palpations: Abdomen is soft. Tenderness: There is no abdominal tenderness.    Musculoskeletal:         General: Normal range of motion. Cervical back: Normal range of motion. Skin:     General: Skin is warm and dry. Neurological:      General: No focal deficit present. Mental Status: She is alert and oriented to person, place, and time. Psychiatric:         Mood and Affect: Mood normal.         Behavior: Behavior normal.       Assessment:       Diagnosis Orders   1. Essential hypertension        2. Hyperlipidemia, unspecified hyperlipidemia type                  Plan:      CV appears stable. BP good. No angina. No evidence CHF. Continue norvasc/cozaar/atenolol for BP. Crestor for lipids. Reviewed previous records and testing including cath 12/22. . ASA. Follow up 6 months.          Jil Muñiz MD

## 2023-02-19 NOTE — FLOWSHEET NOTE
The [facility] and [address],  Sports Performance and Rehabilitation, [facility] [address] [place] [address]  [address] [facility],5[address]   [place]  Phone: [phone]  Fax: [phone]       Physical Therapy Treatment Note/ Progress Report:           Date:  2023     Patient Name:  Mily Penn    :  1955  MRN: [id number]  Restrictions/Precautions:    Medical/Treatment Diagnosis Information:     T26.532 (ICD-10-CM) - S/P reverse total shoulder arthroplasty, right   M25.511 (ICD-10-CM) - Right shoulder pain, unspecified chronicity        Insurance/Certification information:     Physician Information:     Has the plan of care been signed (Y/N):        []  Yes  [x]  No     Date of Patient follow up with Physician:       Is this a Progress Report:     []  Yes  [x]  No        If Yes:  Date Range for reporting period:  Beginning  Ending    Progress report will be due (10 Rx or 30 days whichever is less):        Recertification will be due (POC Duration  / 90 days whichever is less):          Visit # Insurance Allowable Auth Required   1  []  Yes []  No        Functional Scale:     Date assessed:        Latex Allergy:  [x]NO      []YES  Preferred Language for Healthcare:   [x]English       []other:      Pain level:  7/10     SUBJECTIVE:  \"doing ok\"    OBJECTIVE: Observation:   90/20  Test measurements:      RESTRICTIONS/PRECAUTIONS: 90/20    Exercises/Interventions:       Therapeutic Ex (82544) Sets/sec Reps Notes/CUES   Fav 4  15'                                                                      Manual Intervention (08009)      Prom/stm   15'                                                                                                                                            Therapeutic Exercise and NMR EXR  [x] (80949) Provided verbal/tactile cueing for activities related to strengthening, flexibility, endurance, ROM for improvements in LE, proximal hip, and core control with self care, mobility, lifting, ambulation. [x] (14725) Provided verbal/tactile cueing for activities related to improving balance, coordination, kinesthetic sense, posture, motor skill, proprioception to assist with LE, proximal hip, and core control in self-care, mobility, lifting, ambulation and eccentric single leg control. NMR and Therapeutic Activities:    [x] (21250 or 97346) Provided verbal/tactile cueing for activities related to improving balance, coordination, kinesthetic sense, posture, motor skill, proprioception and motor activation to allow for proper function of core, proximal hip and LE with self-care and ADLs and functional mobility.   [] (96219) Gait Re-education- Provided training and instruction to the patient for proper LE, core and proximal hip recruitment and positioning and eccentric body weight control with ambulation re-education including up and down stairs     Home Exercise Program:    [x] (72641) Reviewed/Progressed HEP activities related to strengthening, flexibility, endurance, ROM of core, proximal hip and LE for functional self-care, mobility, lifting and ambulation/stair navigation   [] (96896) Reviewed/Progressed HEP activities related to improving balance, coordination, kinesthetic sense, posture, motor skill, proprioception of core, proximal hip and LE for self-care, mobility, lifting, and ambulation/stair navigation      Manual Treatments:  PROM / STM / Oscillations-Mobs:  G-I, II, III, IV (PA's, Inf., Post.)  [x] (20996) Provided manual therapy to mobilize LE, proximal hip and/or LS spine soft tissue/joints for the purpose of modulating pain, promoting relaxation, increasing ROM, reducing/eliminating soft tissue swelling/inflammation/restriction, improving soft tissue extensibility and allowing for proper ROM for normal function with self-care, mobility, lifting and ambulation. Modalities:     [] GAME READY (VASO)- for significant edema, swelling, pain control. Charges:  Timed Code Treatment Minutes: 35'   Total Treatment Minutes: 28'      [] EVAL (LOW) 09483 (typically 20 minutes face-to-face)  [] EVAL (MOD) 15302 (typically 30 minutes face-to-face)  [] EVAL (HIGH) 44605 (typically 45 minutes face-to-face)  [] RE-EVAL     [x] [place](72025) x   1  [] IONTO  [] NMR (36377) x     [] VASO  [x] Manual (37659) x    1 [] Other:  [] TA x      [] Mech Traction (19203)  [] ES(attended) (37186)      [] ES (un) (53401):         ASSESSMENT:  See eval      GOALS:   Patient stated goal:     [x] Progressing: [] Met: [] Not Met: [] Adjusted    Therapist goals for Patient:   Short Term Goals: To be achieved in: 2 weeks  1. Independent in HEP and progression per patient tolerance, in order to prevent re-injury. [x] Progressing: [] Met: [] Not Met: [] Adjusted   2. Patient will have a decrease in pain to facilitate improvement in movement, function, and ADLs as indicated by Functional Deficits. [x] Progressing: [] Met: [] Not Met: [] Adjusted    Long Term Goals: To be achieved in:  12 weeks  - The patient is expected to demonstrate less than 40% impairment, limitation or restriction in:  - carrying, moving and handling objects. - Patient will demonstrate increased passive ROM to a global deficiency of 20% to allow for proper joint functioning as indicated by patients Functional Deficits. [x] Progressing: [] Met: [] Not Met: [] Adjusted  - Patient will demonstrate an increase in Strength to 4/5 safe zone to allow for proper functional mobility as indicated by patients Functional Deficits. [x] Progressing: [] Met: [] Not Met: [] Adjusted  - Patient will return to desired, higher level upper extremity functional activities without increased symptoms or restriction. [x] Progressing: [] Met: [] Not Met: [] Adjusted                Overall Progression Towards Functional goals/ Treatment Progress Update:  [] Patient is progressing as expected towards functional goals listed.     [] Progression is slowed due to complexities/Impairments listed. [] Progression has been slowed due to co-morbidities. [x] Plan just implemented, too soon to assess goals progression <30days   [] Goals require adjustment due to lack of progress  [] Patient is not progressing as expected and requires additional follow up with physician  [] Other    Prognosis for POC: [x] Good [] Fair  [] Poor      Patient requires continued skilled intervention: [x] Yes  [] No    Treatment/Activity Tolerance:  [x] Patient able to complete treatment  [] Patient limited by fatigue  [] Patient limited by pain    [] Patient limited by other medical complications  [] Other:         Return to Play: (if applicable)   []  Stage 1: Intro to Strength   []  Stage 2: Return to Run and Strength   []  Stage 3: Return to Jump and Strength   []  Stage 4: Dynamic Strength and Agility   []  Stage 5: Sport Specific Training     []  Ready to Return to Play, Meets All Above Stages   []  Not Ready for Return to Sports   Comments:                               PLAN: See eval  [x] Continue per plan of care [] Alter current plan (see comments above)  [] Plan of care initiated [] Hold pending MD visit [] Discharge      Electronically signed by:  Nasreen Palma, PT, MPT     Note: If patient does not return for scheduled/ recommended follow up visits, this note will serve as a discharge from care along with most recent update on progress.

## 2023-02-22 ENCOUNTER — HOSPITAL ENCOUNTER (OUTPATIENT)
Dept: PHYSICAL THERAPY | Age: 68
Setting detail: THERAPIES SERIES
Discharge: HOME | End: 2023-02-22
Payer: MEDICARE

## 2023-02-22 PROCEDURE — 97110 THERAPEUTIC EXERCISES: CPT | Performed by: PHYSICAL THERAPIST

## 2023-02-22 PROCEDURE — 97140 MANUAL THERAPY 1/> REGIONS: CPT | Performed by: PHYSICAL THERAPIST

## 2023-02-26 NOTE — FLOWSHEET NOTE
The [facility] and [address],  Sports Performance and Rehabilitation, [facility] [address] [place] [address]  [address] [facility],5[address] Floor   [place]  Phone: [phone]  Fax: [phone]       Physical Therapy Treatment Note/ Progress Report:           Date:  2023     Patient Name:  Susy Bowden    :  1955  MRN: [id number]  Restrictions/Precautions:    Medical/Treatment Diagnosis Information:     S84.602 (ICD-10-CM) - S/P reverse total shoulder arthroplasty, right   M25.511 (ICD-10-CM) - Right shoulder pain, unspecified chronicity        Insurance/Certification information:     Physician Information:     Has the plan of care been signed (Y/N):        []  Yes  [x]  No     Date of Patient follow up with Physician:       Is this a Progress Report:     []  Yes  [x]  No        If Yes:  Date Range for reporting period:  Beginning  Ending    Progress report will be due (10 Rx or 30 days whichever is less):        Recertification will be due (POC Duration  / 90 days whichever is less):          Visit # Insurance Allowable Auth Required   3  []  Yes []  No        Functional Scale:     Date assessed:        Latex Allergy:  [x]NO      []YES  Preferred Language for Healthcare:   [x]English       []other:      Pain level:  7/10     SUBJECTIVE:  \"i'm feeling ok\"    OBJECTIVE: Observation:     Test measurements:      RESTRICTIONS/PRECAUTIONS: /20    Exercises/Interventions:       Therapeutic Ex (85474) Sets/sec Reps Notes/CUES   Fav 4  15'                                                                      Manual Intervention (65874)      Prom/stm   15'                                                                                                                                            Therapeutic Exercise and NMR EXR  [x] (23669) Provided verbal/tactile cueing for activities related to strengthening, flexibility, endurance, ROM for improvements in LE, proximal hip, and core control with self care, mobility, lifting, ambulation. [x] (62739) Provided verbal/tactile cueing for activities related to improving balance, coordination, kinesthetic sense, posture, motor skill, proprioception to assist with LE, proximal hip, and core control in self-care, mobility, lifting, ambulation and eccentric single leg control. NMR and Therapeutic Activities:    [x] (00078 or 68703) Provided verbal/tactile cueing for activities related to improving balance, coordination, kinesthetic sense, posture, motor skill, proprioception and motor activation to allow for proper function of core, proximal hip and LE with self-care and ADLs and functional mobility.   [] (89516) Gait Re-education- Provided training and instruction to the patient for proper LE, core and proximal hip recruitment and positioning and eccentric body weight control with ambulation re-education including up and down stairs     Home Exercise Program:    [x] (85492) Reviewed/Progressed HEP activities related to strengthening, flexibility, endurance, ROM of core, proximal hip and LE for functional self-care, mobility, lifting and ambulation/stair navigation   [] (93062) Reviewed/Progressed HEP activities related to improving balance, coordination, kinesthetic sense, posture, motor skill, proprioception of core, proximal hip and LE for self-care, mobility, lifting, and ambulation/stair navigation      Manual Treatments:  PROM / STM / Oscillations-Mobs:  G-I, II, III, IV (PA's, Inf., Post.)  [x] (02367) Provided manual therapy to mobilize LE, proximal hip and/or LS spine soft tissue/joints for the purpose of modulating pain, promoting relaxation, increasing ROM, reducing/eliminating soft tissue swelling/inflammation/restriction, improving soft tissue extensibility and allowing for proper ROM for normal function with self-care, mobility, lifting and ambulation.      Modalities:     [] GAME READY (VASO)- for significant edema, swelling, pain control. Charges:  Timed Code Treatment Minutes: 35'   Total Treatment Minutes: 28'      [] EVAL (LOW) 80971 (typically 20 minutes face-to-face)  [] EVAL (MOD) 34078 (typically 30 minutes face-to-face)  [] EVAL (HIGH) 10151 (typically 45 minutes face-to-face)  [] RE-EVAL     [x] SA(11215) x   1  [] IONTO  [] NMR (33878) x     [] VASO  [x] Manual (48697) x    1 [] Other:  [] TA x      [] Mech Traction (11620)  [] ES(attended) (20661)      [] ES (un) (11256):         ASSESSMENT:  See eval      GOALS:   Patient stated goal:     [x] Progressing: [] Met: [] Not Met: [] Adjusted    Therapist goals for Patient:   Short Term Goals: To be achieved in: 2 weeks  1. Independent in HEP and progression per patient tolerance, in order to prevent re-injury. [x] Progressing: [] Met: [] Not Met: [] Adjusted   2. Patient will have a decrease in pain to facilitate improvement in movement, function, and ADLs as indicated by Functional Deficits. [x] Progressing: [] Met: [] Not Met: [] Adjusted    Long Term Goals: To be achieved in:  12 weeks  - The patient is expected to demonstrate less than 40% impairment, limitation or restriction in:  - carrying, moving and handling objects. - Patient will demonstrate increased passive ROM to a global deficiency of 20% to allow for proper joint functioning as indicated by patients Functional Deficits. [x] Progressing: [] Met: [] Not Met: [] Adjusted  - Patient will demonstrate an increase in Strength to 4/5 safe zone to allow for proper functional mobility as indicated by patients Functional Deficits. [x] Progressing: [] Met: [] Not Met: [] Adjusted  - Patient will return to desired, higher level upper extremity functional activities without increased symptoms or restriction. [x] Progressing: [] Met: [] Not Met: [] Adjusted                Overall Progression Towards Functional goals/ Treatment Progress Update:  [] Patient is progressing as expected towards functional goals listed. [] Progression is slowed due to complexities/Impairments listed. [] Progression has been slowed due to co-morbidities. [x] Plan just implemented, too soon to assess goals progression <30days   [] Goals require adjustment due to lack of progress  [] Patient is not progressing as expected and requires additional follow up with physician  [] Other    Prognosis for POC: [x] Good [] Fair  [] Poor      Patient requires continued skilled intervention: [x] Yes  [] No    Treatment/Activity Tolerance:  [x] Patient able to complete treatment  [] Patient limited by fatigue  [] Patient limited by pain    [] Patient limited by other medical complications  [] Other:         Return to Play: (if applicable)   []  Stage 1: Intro to Strength   []  Stage 2: Return to Run and Strength   []  Stage 3: Return to Jump and Strength   []  Stage 4: Dynamic Strength and Agility   []  Stage 5: Sport Specific Training     []  Ready to Return to Play, Meets All Above Stages   []  Not Ready for Return to Sports   Comments:                               PLAN: See eval  [x] Continue per plan of care [] Alter current plan (see comments above)  [] Plan of care initiated [] Hold pending MD visit [] Discharge      Electronically signed by:  Caryn Boudreaux PT, MPT     Note: If patient does not return for scheduled/ recommended follow up visits, this note will serve as a discharge from care along with most recent update on progress.

## 2023-03-01 ENCOUNTER — OFFICE VISIT (OUTPATIENT)
Dept: ORTHOPEDIC SURGERY | Age: 68
End: 2023-03-01

## 2023-03-01 ENCOUNTER — HOSPITAL ENCOUNTER (OUTPATIENT)
Dept: PHYSICAL THERAPY | Age: 68
Setting detail: THERAPIES SERIES
Discharge: HOME | End: 2023-03-01
Payer: MEDICARE

## 2023-03-01 VITALS — WEIGHT: 209 LBS | BODY MASS INDEX: 30.96 KG/M2 | HEIGHT: 69 IN

## 2023-03-01 DIAGNOSIS — Z96.611 S/P REVERSE TOTAL SHOULDER ARTHROPLASTY, RIGHT: Primary | ICD-10-CM

## 2023-03-01 PROCEDURE — 97140 MANUAL THERAPY 1/> REGIONS: CPT | Performed by: PHYSICAL THERAPIST

## 2023-03-01 NOTE — PROGRESS NOTES
Chief Complaint    Follow-up (Right Shoulder)      History of Present Illness:  Sonam Valenzuela is a pleasant, 67 y.o., female, here today for follow up of her right shoulder. She is 7 weeks s/p a right reverse total shoulder replacement with latissimus dorsi transfer on 1/9/23. She has continued in physical therapy at the [place] office working with Jermaine. She has been wearing the brace when at risk only.  Her pain levels are low. She reports no new injuries or setbacks.       Medical History:  Patient's medications, allergies, past medical, surgical, social and family histories were reviewed and updated as appropriate.    No notes on file    Review of Systems  A 14 point review of systems was completed by the patient and is available in the media section of the scanned medical record and was reviewed on 3/1/2023.  The review is negative with the exception of those things mentioned in the HPI and Past Medical History    Vital Signs:  There were no vitals filed for this visit.    General/Appearance: Alert and oriented and in no apparent distress.    Skin:  There are no skin lesions, cellulitis, or extreme edema. The patient has warm and well-perfused Bilateral upper extremities with brisk capillary refill.      Right Shoulder Exam:  Inspection: Incision is healing well. No gross deformities, no signs of infection.    Palpation: No crepitus    Active Range of Motion:  Forward Elevation 100, External Rotation 40, Internal Rotation lower sacrum    Passive Range of Motion: Forward elevation 120    Strength: Deferred    Special Tests:  No ER lag, No Ari muscle deformity.    Neurovascular: Sensation to light touch is intact, no motor deficits, palpable radial pulses 2+      Radiology:     Plain radiographs of the [facility] shoulder comprising 3 views: AP in and out, Axillary lateral were obtained and reviewed in the office: Shows postsurgical changes from the reverse total shoulder replacement.  All the components are in good  placement without any signs of loosening, fractures, subluxations or dislocations. Impression: Stable postop x-ray. Assessment :  Ms. Eve Weller is a pleasant, 75 y.o. patient who is 7 weeks s/p a right reverse total shoulder replacement with latissimus dorsi transfer on 1/9/23. Impression:  Encounter Diagnosis   Name Primary? S/P reverse total shoulder arthroplasty, right Yes       Office Procedures:  Orders Placed This Encounter   Procedures    XR SHOULDER RIGHT (MIN 2 VIEWS)     Standing Status:   Future     Number of Occurrences:   1     Standing Expiration Date:   3/1/2024     Order Specific Question:   Reason for exam:     Answer:   pain    [facility] [place]     Referral Priority:   Routine     Referral Type:   Eval and Treat     Referral Reason:   Specialty Services Required     Requested Specialty:   Physical Therapist     Number of Visits Requested:   1       Treatment Plan: At this point, Eve Weller may d/c the ultrasling brace altogether. We recommend this patient continue in physical therapy twice per week. A new physical therapy letter was documented in [facility] today. At this point she should be lifting no heavier than a cup of coffee. We will see Akiko Alberts back in 4-5 weeks and/or as needed. All questions were answered to patient's satisfaction and She was encouraged to call with any further questions or concerns. Nathalia Jiménez is in agreement with this plan. 3/1/2023  3:57 PM    Mariam Markham, ATC  [employer] [address]    During this examination, IMartin, functioned as a scribe for Dr. Leela Hannon. The history taking and physical examination were performed by Dr. Alysia Kim. All counseling during the appointment was performed between the patient and Dr. Alysia Kim.  3/1/23  ______________  I, Dr. Leela Hannon, personally performed the services described in this documentation as described by Theodora Elliott MAIRA Owen in my presence, and it is both accurate and complete. Dominick Simmons MD, PhD  3/1/2023

## 2023-03-01 NOTE — LETTER
Physical Therapy Rehabilitation Referral    Patient Name:  Eve Weller      YOB: 1955    Diagnosis:    1. S/P reverse total shoulder arthroplasty, right        Precautions: subscapularis, NWB RUE  [x] Evaluate and Treat    Post Op Instructions:  [] Continuous passive motion (CPM)  [x] Elbow ROM  [x] Exercise in plane of scapula  [x]  Strengthening     [] Pulley and instruction   [x] Home exercise program (copy to patient)   [] Sling when arm at risk  [] Sling or brace at all times   [x] AAROM: Forward elevation to 140            [x] AAROM: External rotation to 40  [] Isometric external rotator strengthening [x] AAROM: internal rotation: up the back  [x] Isometric abductor strengthening  [x] AAROM: Internal abduction   [] Isometric internal rotator strengthening [x] AAROM: cross-body adduction             Stretching:     Strengthening:  [] Four quadrant (FE, ER, IR, CBA)  [] Rotator cuff (ER, IR, Abd)  [] Forward Elevation    [] External Rotators     [] External Rotation    [] Internal Rotators  [] Internal Rotation: up/back   [] Abductors     [] Internal Rotation: supine in abduction  [] Sleeper Stretch    [] Flexors  [] Cross-body abduction    [] Extensors  [x] Pendulum (FE, Abd/Add, cw/ccw)  [x] Scapular Stabilizers   [x] Wall-walking (FE, Abd)        [x] Shoulder shrugs     [x] Table slides (FE)                [x] Rhomboid pinch  [] Elbow (flex, ext, pron, sup)        [] Lat.  Pull downs     [] Medial epicondylitis program       [] Forward punch   [] Lateral epicondylitis program       [] Internal rotators     [] Progressive resistive exercises  [] Bench Press        [] Bench press plus  Activities:     [] Lateral pull-downs  [] Rowing     [] Progressive two-hand supine press  [] Stepper/Exercise bike   [] Biceps: curls/supination  [] Swimming  [] Water exercises    Modalities:     Return to Sport:  [x] Of Choice      [] Plyometrics  [] Ultrasound     [] Rhythmic stabilization  [] Iontophoresis    [] Core strengthening   [] Moist heat     [] Sports specific program:   [] Massage         [x] Cryotherapy      [] Electrical stimulation     [] Paraffin  [] Whirlpool  [] TENS    [x] Home exercise program (copy to patient). Perform exercises for:   15     minutes    3      times/day  [x] Supervised physical therapy  Frequency: []  1x week  [x] 2x week  [] 3x week  [] Other:   Duration: [] 2 weeks   [] 4 weeks  [x] 6 weeks  [] Other:     Additional Instructions:   D/c brace   AROM in all directions  Bicep curls at 8 weeks postop    Samer LISA Caal MD, PhD

## 2023-03-01 NOTE — PROGRESS NOTES
The [facility] and [address],  Sports Performance and Rehabilitation, [facility] [address]  [address] [facility],5[address]   [place]  Phone: [phone]  Fax: [phone]     Date: 3/1/2023  Physician: Dr Enedelia Gusman   Patient: Evens Kenyon     Diagnosis: TSR with lat transfer     Patient has received 4 sessions of Physical Therapy =   [] Pushing / pulling  [] Sitting  [] Concentrating / reading     Specific Functional Improvement and Impression:  Doing well overall. 90/20 degrees. Not pushing. Thanks     Summary:   [x] Patient is progressing as expected for this condition   [] Patient is progressing, but slower than expected for this condition   [] Patient is not progressing  Clinician Recommendations:   [x] Continue rehabilitation due to objective improvement and continued functional deficits. [] Follow up periodically to advance home exercise program to match level of function. [] Continue rehabitation due to objective improvement and continued functional deficits with progression to work conditioning. [] Discharge to post-rehab program secondary to maximizing \"medical necessity\" of physical / occupational therapy.    [] Discharge independent in a home program as:  [] All goals achieved  [] Maximized \"medical necessity\" of physical / occupational therapy  [] No subjective or objective improvements    Plan:   Electronically signed by: Ethelda Buerger, PT, MPT   License #: [id number]     Physician Recommendations:  [] Follow treatment plan as above [] Discontinue physical therapy  [] Change plan to: _______________________________________________________________

## 2023-03-05 NOTE — FLOWSHEET NOTE
The [facility] and [address],  Sports Performance and Rehabilitation, [facility] [address]  [address] [facility],5[address]   [place]  Phone: [phone]  Fax: [phone]       Physical Therapy Treatment Note/ Progress Report:           Date:  3/01/2023     Patient Name:  Evens Kenyon    :  1955  MRN: [id number]  Restrictions/Precautions:    Medical/Treatment Diagnosis Information:     T47.089 (ICD-10-CM) - S/P reverse total shoulder arthroplasty, right   M25.511 (ICD-10-CM) - Right shoulder pain, unspecified chronicity        Insurance/Certification information:     Physician Information:     Has the plan of care been signed (Y/N):        []  Yes  [x]  No     Date of Patient follow up with Physician:       Is this a Progress Report:     []  Yes  [x]  No        If Yes:  Date Range for reporting period:  Beginning  Ending    Progress report will be due (10 Rx or 30 days whichever is less):        Recertification will be due (POC Duration  / 90 days whichever is less):          Visit # Insurance Allowable Auth Required   4  []  Yes []  No        Functional Scale:     Date assessed:        Latex Allergy:  [x]NO      []YES  Preferred Language for Healthcare:   [x]English       []other:      Pain level:  7/10     SUBJECTIVE:  \"doing well. Karo Balls Karo Balls \"    OBJECTIVE: Observation:   90/20  Test measurements:      RESTRICTIONS/PRECAUTIONS: 90/20    Exercises/Interventions:       Therapeutic Ex (52926) Sets/sec Reps Notes/CUES   Fav 4                                                                       Manual Intervention (43564)      Prom/stm   25'                                                                                                                                            Therapeutic Exercise and NMR EXR  [x] (76285) Provided verbal/tactile cueing for activities related to strengthening, flexibility, endurance, ROM for improvements in LE, proximal hip, and core control with self care, mobility, lifting, ambulation. [x] (81752) Provided verbal/tactile cueing for activities related to improving balance, coordination, kinesthetic sense, posture, motor skill, proprioception to assist with LE, proximal hip, and core control in self-care, mobility, lifting, ambulation and eccentric single leg control. NMR and Therapeutic Activities:    [x] (39413 or 81749) Provided verbal/tactile cueing for activities related to improving balance, coordination, kinesthetic sense, posture, motor skill, proprioception and motor activation to allow for proper function of core, proximal hip and LE with self-care and ADLs and functional mobility.   [] (35906) Gait Re-education- Provided training and instruction to the patient for proper LE, core and proximal hip recruitment and positioning and eccentric body weight control with ambulation re-education including up and down stairs     Home Exercise Program:    [x] (20793) Reviewed/Progressed HEP activities related to strengthening, flexibility, endurance, ROM of core, proximal hip and LE for functional self-care, mobility, lifting and ambulation/stair navigation   [] (88367) Reviewed/Progressed HEP activities related to improving balance, coordination, kinesthetic sense, posture, motor skill, proprioception of core, proximal hip and LE for self-care, mobility, lifting, and ambulation/stair navigation      Manual Treatments:  PROM / STM / Oscillations-Mobs:  G-I, II, III, IV (PA's, Inf., Post.)  [x] (12075) Provided manual therapy to mobilize LE, proximal hip and/or LS spine soft tissue/joints for the purpose of modulating pain, promoting relaxation, increasing ROM, reducing/eliminating soft tissue swelling/inflammation/restriction, improving soft tissue extensibility and allowing for proper ROM for normal function with self-care, mobility, lifting and ambulation.      Modalities:     [] GAME READY (VASO)- for significant edema, swelling, pain control. Charges:  Timed Code Treatment Minutes: 35'   Total Treatment Minutes: 28'      [] EVAL (LOW) 30530 (typically 20 minutes face-to-face)  [] EVAL (MOD) 35617 (typically 30 minutes face-to-face)  [] EVAL (HIGH) 09226 (typically 45 minutes face-to-face)  [] RE-EVAL     [] UI(86099) x   1  [] IONTO  [] NMR (57545) x     [] VASO  [x] Manual (03659) x  2 [] Other: egs  [] TA x      [] Mech Traction (24851)  [] ES(attended) (23543)      [] ES (un) (00096):         ASSESSMENT:  See eval      GOALS:   Patient stated goal:     [x] Progressing: [] Met: [] Not Met: [] Adjusted    Therapist goals for Patient:   Short Term Goals: To be achieved in: 2 weeks  1. Independent in HEP and progression per patient tolerance, in order to prevent re-injury. [x] Progressing: [] Met: [] Not Met: [] Adjusted   2. Patient will have a decrease in pain to facilitate improvement in movement, function, and ADLs as indicated by Functional Deficits. [x] Progressing: [] Met: [] Not Met: [] Adjusted    Long Term Goals: To be achieved in:  12 weeks  - The patient is expected to demonstrate less than 40% impairment, limitation or restriction in:  - carrying, moving and handling objects. - Patient will demonstrate increased passive ROM to a global deficiency of 20% to allow for proper joint functioning as indicated by patients Functional Deficits. [x] Progressing: [] Met: [] Not Met: [] Adjusted  - Patient will demonstrate an increase in Strength to 4/5 safe zone to allow for proper functional mobility as indicated by patients Functional Deficits. [x] Progressing: [] Met: [] Not Met: [] Adjusted  - Patient will return to desired, higher level upper extremity functional activities without increased symptoms or restriction. [x] Progressing: [] Met: [] Not Met: [] Adjusted                Overall Progression Towards Functional goals/ Treatment Progress Update:  [] Patient is progressing as expected towards functional goals listed.   [] Progression is slowed due to complexities/Impairments listed.  [] Progression has been slowed due to co-morbidities.  [x] Plan just implemented, too soon to assess goals progression <30days   [] Goals require adjustment due to lack of progress  [] Patient is not progressing as expected and requires additional follow up with physician  [] Other    Prognosis for POC: [x] Good [] Fair  [] Poor      Patient requires continued skilled intervention: [x] Yes  [] No    Treatment/Activity Tolerance:  [x] Patient able to complete treatment  [] Patient limited by fatigue  [] Patient limited by pain    [] Patient limited by other medical complications  [] Other:         Return to Play: (if applicable)   []  Stage 1: Intro to Strength   []  Stage 2: Return to Run and Strength   []  Stage 3: Return to Jump and Strength   []  Stage 4: Dynamic Strength and Agility   []  Stage 5: Sport Specific Training     []  Ready to Return to Play, Meets All Above Stages   []  Not Ready for Return to Sports   Comments:                               PLAN: See eval  [x] Continue per plan of care [] Alter current plan (see comments above)  [] Plan of care initiated [] Hold pending MD visit [] Discharge      Electronically signed by:  Jermaine Triplett, PT, MPT     Note: If patient does not return for scheduled/ recommended follow up visits, this note will serve as a discharge from care along with most recent update on progress.

## 2023-03-10 ENCOUNTER — HOSPITAL ENCOUNTER (OUTPATIENT)
Dept: PHYSICAL THERAPY | Age: 68
Setting detail: THERAPIES SERIES
Discharge: HOME | End: 2023-03-10
Payer: MEDICARE

## 2023-03-10 PROCEDURE — 97110 THERAPEUTIC EXERCISES: CPT | Performed by: PHYSICAL THERAPIST

## 2023-03-10 PROCEDURE — 97140 MANUAL THERAPY 1/> REGIONS: CPT | Performed by: PHYSICAL THERAPIST

## 2023-03-12 NOTE — FLOWSHEET NOTE
The [facility] and [address],  Sports Performance and Rehabilitation, [facility] [address]  [address] [facility],5Th Floor   [place]  Phone: [phone]  Fax: [phone]       Physical Therapy Treatment Note/ Progress Report:           Date:  3/01/2023     Patient Name:  Doyce Apgar    :  1955  MRN: [id number]  Restrictions/Precautions:    Medical/Treatment Diagnosis Information:     A72.086 (ICD-10-CM) - S/P reverse total shoulder arthroplasty, right   M25.511 (ICD-10-CM) - Right shoulder pain, unspecified chronicity        Insurance/Certification information:     Physician Information:     Has the plan of care been signed (Y/N):        []  Yes  [x]  No     Date of Patient follow up with Physician:       Is this a Progress Report:     []  Yes  [x]  No        If Yes:  Date Range for reporting period:  Beginning  Ending    Progress report will be due (10 Rx or 30 days whichever is less):        Recertification will be due (POC Duration  / 90 days whichever is less):          Visit # Insurance Allowable Auth Required   5  []  Yes []  No        Functional Scale:     Date assessed:        Latex Allergy:  [x]NO      []YES  Preferred Language for Healthcare:   [x]English       []other:      Pain level:  7/10     SUBJECTIVE:  \"I am feeling good\"    OBJECTIVE: Observation:   20  Test measurements:      RESTRICTIONS/PRECAUTIONS: 90/20    Exercises/Interventions:       Therapeutic Ex (78450) Sets/sec Reps Notes/CUES   Fav 4  15'                                                                      Manual Intervention (93848)      Prom/stm   25'                                                                                                                                            Therapeutic Exercise and NMR EXR  [x] (88394) Provided verbal/tactile cueing for activities related to strengthening, flexibility, endurance, ROM for improvements in LE, proximal hip, and core control with self care, mobility, lifting, ambulation. [x] (73242) Provided verbal/tactile cueing for activities related to improving balance, coordination, kinesthetic sense, posture, motor skill, proprioception to assist with LE, proximal hip, and core control in self-care, mobility, lifting, ambulation and eccentric single leg control. NMR and Therapeutic Activities:    [x] (45962 or 53463) Provided verbal/tactile cueing for activities related to improving balance, coordination, kinesthetic sense, posture, motor skill, proprioception and motor activation to allow for proper function of core, proximal hip and LE with self-care and ADLs and functional mobility.   [] (35155) Gait Re-education- Provided training and instruction to the patient for proper LE, core and proximal hip recruitment and positioning and eccentric body weight control with ambulation re-education including up and down stairs     Home Exercise Program:    [x] (50106) Reviewed/Progressed HEP activities related to strengthening, flexibility, endurance, ROM of core, proximal hip and LE for functional self-care, mobility, lifting and ambulation/stair navigation   [] (46623) Reviewed/Progressed HEP activities related to improving balance, coordination, kinesthetic sense, posture, motor skill, proprioception of core, proximal hip and LE for self-care, mobility, lifting, and ambulation/stair navigation      Manual Treatments:  PROM / STM / Oscillations-Mobs:  G-I, II, III, IV (PA's, Inf., Post.)  [x] (22285) Provided manual therapy to mobilize LE, proximal hip and/or LS spine soft tissue/joints for the purpose of modulating pain, promoting relaxation, increasing ROM, reducing/eliminating soft tissue swelling/inflammation/restriction, improving soft tissue extensibility and allowing for proper ROM for normal function with self-care, mobility, lifting and ambulation.      Modalities:     [] GAME READY (VASO)- for significant edema, swelling, pain control.     Charges:  Timed Code Treatment Minutes: 35'   Total Treatment Minutes: 35'      [] EVAL (LOW) 91016 (typically 20 minutes face-to-face)  [] EVAL (MOD) 88572 (typically 30 minutes face-to-face)  [] EVAL (HIGH) 62318 (typically 45 minutes face-to-face)  [] RE-EVAL     [x] TE(26550) x   1  [] IONTO  [] NMR (02708) x     [] VASO  [x] Manual (56056) x  2 [] Other: egs  [] TA x      [] Mech Traction (38428)  [] ES(attended) (55304)      [] ES (un) (83896):         ASSESSMENT:  See eval      GOALS:   Patient stated goal:     [x] Progressing: [] Met: [] Not Met: [] Adjusted    Therapist goals for Patient:   Short Term Goals: To be achieved in: 2 weeks  1. Independent in HEP and progression per patient tolerance, in order to prevent re-injury.     [x] Progressing: [] Met: [] Not Met: [] Adjusted   2. Patient will have a decrease in pain to facilitate improvement in movement, function, and ADLs as indicated by Functional Deficits.    [x] Progressing: [] Met: [] Not Met: [] Adjusted    Long Term Goals: To be achieved in:  12 weeks  - The patient is expected to demonstrate less than 40% impairment, limitation or restriction in:  - carrying, moving and handling objects.  - Patient will demonstrate increased passive ROM to a global deficiency of 20% to allow for proper joint functioning as indicated by patients Functional Deficits.   [x] Progressing: [] Met: [] Not Met: [] Adjusted  - Patient will demonstrate an increase in Strength to 4/5 safe zone to allow for proper functional mobility as indicated by patients Functional Deficits.   [x] Progressing: [] Met: [] Not Met: [] Adjusted  - Patient will return to desired, higher level upper extremity functional activities without increased symptoms or restriction.  [x] Progressing: [] Met: [] Not Met: [] Adjusted                Overall Progression Towards Functional goals/ Treatment Progress Update:  [] Patient is progressing as expected towards functional goals listed.   [] Progression is slowed due to complexities/Impairments listed. [] Progression has been slowed due to co-morbidities. [x] Plan just implemented, too soon to assess goals progression <30days   [] Goals require adjustment due to lack of progress  [] Patient is not progressing as expected and requires additional follow up with physician  [] Other    Prognosis for POC: [x] Good [] Fair  [] Poor      Patient requires continued skilled intervention: [x] Yes  [] No    Treatment/Activity Tolerance:  [x] Patient able to complete treatment  [] Patient limited by fatigue  [] Patient limited by pain    [] Patient limited by other medical complications  [] Other:         Return to Play: (if applicable)   []  Stage 1: Intro to Strength   []  Stage 2: Return to Run and Strength   []  Stage 3: Return to Jump and Strength   []  Stage 4: Dynamic Strength and Agility   []  Stage 5: Sport Specific Training     []  Ready to Return to Play, Meets All Above Stages   []  Not Ready for Return to Sports   Comments:                               PLAN: See eval  [x] Continue per plan of care [] Alter current plan (see comments above)  [] Plan of care initiated [] Hold pending MD visit [] Discharge      Electronically signed by:  Alejandro Vasques PT, MPT     Note: If patient does not return for scheduled/ recommended follow up visits, this note will serve as a discharge from care along with most recent update on progress.

## 2023-03-14 ENCOUNTER — HOSPITAL ENCOUNTER (OUTPATIENT)
Dept: PHYSICAL THERAPY | Age: 68
Setting detail: THERAPIES SERIES
Discharge: HOME | End: 2023-03-14
Payer: MEDICARE

## 2023-03-14 PROCEDURE — 97110 THERAPEUTIC EXERCISES: CPT | Performed by: PHYSICAL THERAPIST

## 2023-03-14 PROCEDURE — 97140 MANUAL THERAPY 1/> REGIONS: CPT | Performed by: PHYSICAL THERAPIST

## 2023-03-16 NOTE — FLOWSHEET NOTE
[facility] and 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 (ICD-10-CM) - S/P reverse total shoulder arthroplasty, right   M25.511 (ICD-10-CM) - Right shoulder pain, unspecified chronicity        Insurance/Certification information:     Physician Information:     Has the plan of care been signed (Y/N):        []  Yes  [x]  No     Date of Patient follow up with Physician:       Is this a Progress Report:     []  Yes  [x]  No        If Yes:  Date Range for reporting period:  Beginning  Ending    Progress report will be due (10 Rx or 30 days whichever is less):        Recertification will be due (POC Duration  / 90 days whichever is less):          Visit # Insurance Allowable Auth Required   5  []  Yes []  No        Functional Scale:     Date assessed:        Latex Allergy:  [x]NO      []YES  Preferred Language for Healthcare:   [x]English       []other:      Pain level:  7/10     SUBJECTIVE:  \"I am moving along\"    OBJECTIVE: Observation:   120/30  Test measurements:      RESTRICTIONS/PRECAUTIONS: 90/20    Exercises/Interventions:       Therapeutic Ex (56914) Sets/sec Reps Notes/CUES   Fav 4  15'    Ball roll   20x     T bands: rows   Green - 20x     Supine er @ 0  20x    Abd iso  10 x 10\"                                              Manual Intervention ([id number])      Prom/stm   25'                                                                                                                                            Therapeutic Exercise and NMR EXR  [x] (36805) Provided verbal/tactile cueing for activities related to strengthening, flexibility, endurance, ROM for improvements in LE, proximal hip, and core control with self care, mobility, lifting, ambulation. [x] (02209) Provided verbal/tactile cueing for activities related to improving balance, coordination, kinesthetic sense, posture, motor skill, proprioception to assist with LE, proximal hip, and core control in self-care, mobility, lifting, ambulation and eccentric single leg control. NMR and Therapeutic Activities:    [x] (72578 or 02064) Provided verbal/tactile cueing for activities related to improving balance, coordination, kinesthetic sense, posture, motor skill, proprioception and motor activation to allow for proper function of core, proximal hip and LE with self-care and ADLs and functional mobility.   [] (73952) Gait Re-education- Provided training and instruction to the patient for proper LE, core and proximal hip recruitment and positioning and eccentric body weight control with ambulation re-education including up and down stairs     Home Exercise Program:    [x] (32722) Reviewed/Progressed HEP activities related to strengthening, flexibility, endurance, ROM of core, proximal hip and LE for functional self-care, mobility, lifting and ambulation/stair navigation   [] (43880) Reviewed/Progressed HEP activities related to improving balance, coordination, kinesthetic sense, posture, motor skill, proprioception of core, proximal hip and LE for self-care, mobility, lifting, and ambulation/stair navigation      Manual Treatments:  PROM / STM / Oscillations-Mobs:  G-I, II, III, IV (PA's, Inf., Post.)  [x] (05324) Provided manual therapy to mobilize LE, proximal hip and/or LS spine soft tissue/joints for the purpose of modulating pain, promoting relaxation, increasing ROM, reducing/eliminating soft tissue swelling/inflammation/restriction, improving soft tissue extensibility and allowing for proper ROM for normal function with self-care, mobility, lifting and ambulation. Modalities:     [] GAME READY (VASO)- for significant edema, swelling, pain control. Charges:  Timed Code Treatment Minutes: 35'   Total Treatment Minutes: 28'      [] EVAL (LOW) 77103 (typically 20 minutes face-to-face)  [] EVAL (MOD) 69520 (typically 30 minutes face-to-face)  [] EVAL (HIGH) 47620 (typically 45 minutes face-to-face)  [] RE-EVAL     [x] ZG(26807) x   1  [] IONTO  [] NMR (98697) x     [] VASO  [x] Manual (42327) x  2 [] Other: egs  [] TA x      [] Mech Traction (27038)  [] ES(attended) (90697)      [] ES (un) (34746):         ASSESSMENT:  See eval      GOALS:   Patient stated goal:     [x] Progressing: [] Met: [] Not Met: [] Adjusted    Therapist goals for Patient:   Short Term Goals: To be achieved in: 2 weeks  1. Independent in HEP and progression per patient tolerance, in order to prevent re-injury. [x] Progressing: [] Met: [] Not Met: [] Adjusted   2. Patient will have a decrease in pain to facilitate improvement in movement, function, and ADLs as indicated by Functional Deficits. [x] Progressing: [] Met: [] Not Met: [] Adjusted    Long Term Goals: To be achieved in:  12 weeks  - The patient is expected to demonstrate less than 40% impairment, limitation or restriction in:  - carrying, moving and handling objects. - Patient will demonstrate increased passive ROM to a global deficiency of 20% to allow for proper joint functioning as indicated by patients Functional Deficits. [x] Progressing: [] Met: [] Not Met: [] Adjusted  - Patient will demonstrate an increase in Strength to 4/5 safe zone to allow for proper functional mobility as indicated by patients Functional Deficits. [x] Progressing: [] Met: [] Not Met: [] Adjusted  - Patient will return to desired, higher level upper extremity functional activities without increased symptoms or restriction.   [x] Progressing: [] Met: [] Not Met: [] Adjusted                Overall Progression Towards Functional goals/ Treatment Progress Update:  [] Patient is progressing as expected towards functional goals listed. [] Progression is slowed due to complexities/Impairments listed. [] Progression has been slowed due to co-morbidities. [x] Plan just implemented, too soon to assess goals progression <30days   [] Goals require adjustment due to lack of progress  [] Patient is not progressing as expected and requires additional follow up with physician  [] Other    Prognosis for POC: [x] Good [] Fair  [] Poor      Patient requires continued skilled intervention: [x] Yes  [] No    Treatment/Activity Tolerance:  [x] Patient able to complete treatment  [] Patient limited by fatigue  [] Patient limited by pain    [] Patient limited by other medical complications  [] Other:         Return to Play: (if applicable)   []  Stage 1: Intro to Strength   []  Stage 2: Return to Run and Strength   []  Stage 3: Return to Jump and Strength   []  Stage 4: Dynamic Strength and Agility   []  Stage 5: Sport Specific Training     []  Ready to Return to Play, Meets All Above Stages   []  Not Ready for Return to Sports   Comments:                               PLAN: See eval  [x] Continue per plan of care [] Alter current plan (see comments above)  [] Plan of care initiated [] Hold pending MD visit [] Discharge      Electronically signed by:  Jacklyn Valiente, PT, MPT     Note: If patient does not return for scheduled/ recommended follow up visits, this note will serve as a discharge from care along with most recent update on progress.

## 2023-03-17 ENCOUNTER — HOSPITAL ENCOUNTER (OUTPATIENT)
Dept: PHYSICAL THERAPY | Age: 68
Setting detail: THERAPIES SERIES
Discharge: HOME | End: 2023-03-17
Payer: MEDICARE

## 2023-03-17 PROCEDURE — 97110 THERAPEUTIC EXERCISES: CPT | Performed by: PHYSICAL THERAPIST

## 2023-03-17 PROCEDURE — 97140 MANUAL THERAPY 1/> REGIONS: CPT | Performed by: PHYSICAL THERAPIST

## 2023-03-19 NOTE — FLOWSHEET NOTE
[facility] and [address],  Sports Performance and Rehabilitation, [facility] [address]99 [address]  [address] [facility],5Th Floor   [place]  Phone: [phone]  Fax: [phone]       Physical Therapy Treatment Note/ Progress Report:           Date:  3/17/2023     Patient Name:  Supriya Ramirez    :  1955  MRN: [id number]  Restrictions/Precautions:    Medical/Treatment Diagnosis Information:     F79.362 (ICD-10-CM) - S/P reverse total shoulder arthroplasty, right   M25.511 (ICD-10-CM) - Right shoulder pain, unspecified chronicity        Insurance/Certification information:     Physician Information:     Has the plan of care been signed (Y/N):        []  Yes  [x]  No     Date of Patient follow up with Physician:       Is this a Progress Report:     []  Yes  [x]  No        If Yes:  Date Range for reporting period:  Beginning  Ending    Progress report will be due (10 Rx or 30 days whichever is less):        Recertification will be due (POC Duration  / 90 days whichever is less):          Visit # Insurance Allowable Auth Required   6  []  Yes []  No        Functional Scale:     Date assessed:        Latex Allergy:  [x]NO      []YES  Preferred Language for Healthcare:   [x]English       []other:      Pain level:  7/10     SUBJECTIVE:  \"I am feeling better\"    OBJECTIVE: Observation:   120/30  Test measurements:      RESTRICTIONS/PRECAUTIONS: 90/20    Exercises/Interventions:       Therapeutic Ex (84628) Sets/sec Reps Notes/CUES   Fav 4  15'    Ball roll   20x     T bands: rows   Green - 20x     Supine er @ 0  20x    Abd iso  10 x 10\"                                              Manual Intervention ([address] [facility])      Prom/stm   25'                                                                                                                                            Therapeutic Exercise and NMR EXR  [x] (94237) Provided verbal/tactile cueing for activities related to strengthening, flexibility, endurance, ROM for improvements in LE, proximal hip, and core control with self care, mobility, lifting, ambulation. [x] (40912) Provided verbal/tactile cueing for activities related to improving balance, coordination, kinesthetic sense, posture, motor skill, proprioception to assist with LE, proximal hip, and core control in self-care, mobility, lifting, ambulation and eccentric single leg control. NMR and Therapeutic Activities:    [x] (86455 or 97025) Provided verbal/tactile cueing for activities related to improving balance, coordination, kinesthetic sense, posture, motor skill, proprioception and motor activation to allow for proper function of core, proximal hip and LE with self-care and ADLs and functional mobility.   [] (54112) Gait Re-education- Provided training and instruction to the patient for proper LE, core and proximal hip recruitment and positioning and eccentric body weight control with ambulation re-education including up and down stairs     Home Exercise Program:    [x] (24455) Reviewed/Progressed HEP activities related to strengthening, flexibility, endurance, ROM of core, proximal hip and LE for functional self-care, mobility, lifting and ambulation/stair navigation   [] (41617) Reviewed/Progressed HEP activities related to improving balance, coordination, kinesthetic sense, posture, motor skill, proprioception of core, proximal hip and LE for self-care, mobility, lifting, and ambulation/stair navigation      Manual Treatments:  PROM / STM / Oscillations-Mobs:  G-I, II, III, IV (PA's, Inf., Post.)  [x] (42012) Provided manual therapy to mobilize LE, proximal hip and/or LS spine soft tissue/joints for the purpose of modulating pain, promoting relaxation, increasing ROM, reducing/eliminating soft tissue swelling/inflammation/restriction, improving soft tissue extensibility and allowing for proper ROM for normal function with self-care, mobility, lifting and ambulation. Modalities:     [] GAME READY (VASO)- for significant edema, swelling, pain control. Charges:  Timed Code Treatment Minutes: 43'   Total Treatment Minutes: 43'      [] EVAL (LOW) 67243 (typically 20 minutes face-to-face)  [] EVAL (MOD) 16151 (typically 30 minutes face-to-face)  [] EVAL (HIGH) 74470 (typically 45 minutes face-to-face)  [] RE-EVAL     [x] LF(47350) x   1  [] IONTO  [] NMR (27213) x     [] VASO  [x] Manual (95071) x  2 [] Other: egs  [] TA x      [] Mech Traction (14226)  [] ES(attended) (06046)      [] ES (un) (08702):         ASSESSMENT:  See eval      GOALS:   Patient stated goal:     [x] Progressing: [] Met: [] Not Met: [] Adjusted    Therapist goals for Patient:   Short Term Goals: To be achieved in: 2 weeks  1. Independent in HEP and progression per patient tolerance, in order to prevent re-injury. [x] Progressing: [] Met: [] Not Met: [] Adjusted   2. Patient will have a decrease in pain to facilitate improvement in movement, function, and ADLs as indicated by Functional Deficits. [x] Progressing: [] Met: [] Not Met: [] Adjusted    Long Term Goals: To be achieved in:  12 weeks  - The patient is expected to demonstrate less than 40% impairment, limitation or restriction in:  - carrying, moving and handling objects. - Patient will demonstrate increased passive ROM to a global deficiency of 20% to allow for proper joint functioning as indicated by patients Functional Deficits. [x] Progressing: [] Met: [] Not Met: [] Adjusted  - Patient will demonstrate an increase in Strength to 4/5 safe zone to allow for proper functional mobility as indicated by patients Functional Deficits. [x] Progressing: [] Met: [] Not Met: [] Adjusted  - Patient will return to desired, higher level upper extremity functional activities without increased symptoms or restriction.   [x] Progressing: [] Met: [] Not Met: [] Adjusted                Overall Progression Towards Functional goals/ Treatment Progress Update:  [] Patient is progressing as expected towards functional goals listed. [] Progression is slowed due to complexities/Impairments listed. [] Progression has been slowed due to co-morbidities. [x] Plan just implemented, too soon to assess goals progression <30days   [] Goals require adjustment due to lack of progress  [] Patient is not progressing as expected and requires additional follow up with physician  [] Other    Prognosis for POC: [x] Good [] Fair  [] Poor      Patient requires continued skilled intervention: [x] Yes  [] No    Treatment/Activity Tolerance:  [x] Patient able to complete treatment  [] Patient limited by fatigue  [] Patient limited by pain    [] Patient limited by other medical complications  [] Other:         Return to Play: (if applicable)   []  Stage 1: Intro to Strength   []  Stage 2: Return to Run and Strength   []  Stage 3: Return to Jump and Strength   []  Stage 4: Dynamic Strength and Agility   []  Stage 5: Sport Specific Training     []  Ready to Return to Play, Meets All Above Stages   []  Not Ready for Return to Sports   Comments:                               PLAN: See eval  [x] Continue per plan of care [] Alter current plan (see comments above)  [] Plan of care initiated [] Hold pending MD visit [] Discharge      Electronically signed by:  Judie Malin, PT, MPT     Note: If patient does not return for scheduled/ recommended follow up visits, this note will serve as a discharge from care along with most recent update on progress.

## 2023-03-21 ENCOUNTER — HOSPITAL ENCOUNTER (OUTPATIENT)
Dept: PHYSICAL THERAPY | Age: 68
Setting detail: THERAPIES SERIES
Discharge: HOME | End: 2023-03-21
Payer: MEDICARE

## 2023-03-21 PROCEDURE — 97140 MANUAL THERAPY 1/> REGIONS: CPT | Performed by: PHYSICAL THERAPIST

## 2023-03-21 PROCEDURE — 97110 THERAPEUTIC EXERCISES: CPT | Performed by: PHYSICAL THERAPIST

## 2023-03-23 NOTE — PROGRESS NOTES
Sitting   Cuff Size: Medium Adult   Pulse: 71   Temp: 98.1 °F (36.7 °C)   TempSrc: Temporal   SpO2: 95%   Weight: 206 lb 3.2 oz (93.5 kg)   Height: 5' 9\" (1.753 m)      Body mass index is 30.45 kg/m². Allergies   Allergen Reactions    Cephalexin Rash    Cephalosporins Hives and Shortness Of Breath    Penicillins Anaphylaxis and Rash    Atorvastatin      Muscle weakness    Hazelnut (Filbert) Allergy Skin Test     Lisinopril      Itching Sensation in throat; full feeling in throat    Peanut-Containing Drug Products      Prior to Visit Medications    Medication Sig Taking?  Authorizing Provider   amLODIPine (NORVASC) 5 MG tablet TAKE 1 TABLET BY MOUTH EVERY DAY Yes Bridgett Marte APRN - CNP   rosuvastatin (CRESTOR) 40 MG tablet TAKE 1 TABLET BY MOUTH EVERY DAY Yes Buzzy Habermann, MD   atenolol (TENORMIN) 50 MG tablet Take 1 tablet by mouth daily Yes Buzzy Habermann, MD   losartan (COZAAR) 100 MG tablet Take 1 tablet by mouth daily Yes Buzzy Habermann, MD   venlafaxine (EFFEXOR XR) 150 MG extended release capsule TAKE 1 CAPSULE BY MOUTH EVERY DAY Yes Buzzy Habermann, MD   Cholecalciferol 50 MCG (2000 UT) TABS Take 1,000 Units by mouth daily  Yes Historical Provider, MD   calcium citrate (CALCITRATE) 250 MG TABS tablet Take 1 tablet by mouth daily Yes Buzzy Habermann, MD   Omega-3 Fatty Acids (FISH OIL) 1000 MG CAPS Take 1 capsule by mouth daily Yes Buzzy Habermann, MD   Cyanocobalamin (VITAMIN B12 PO) Take by mouth Yes Historical Provider, MD   Multiple Vitamins-Minerals (MULTIVITAMIN ADULT PO) Take by mouth Yes Historical Provider, MD   vitamin C (ASCORBIC ACID) 500 MG tablet Take 500 mg by mouth daily Yes Historical Provider, MD [place] (Including outside providers/suppliers regularly involved in providing care):   Patient Care Team:  Buzzy Habermann, MD as PCP - General (Internal Medicine)  Buzzy Habermann, MD as PCP -

## 2023-03-24 ENCOUNTER — HOSPITAL ENCOUNTER (OUTPATIENT)
Dept: PHYSICAL THERAPY | Age: 68
Setting detail: THERAPIES SERIES
Discharge: HOME | End: 2023-03-24
Payer: MEDICARE

## 2023-03-24 ENCOUNTER — OFFICE VISIT (OUTPATIENT)
Dept: INTERNAL MEDICINE CLINIC | Age: 68
End: 2023-03-24
Payer: MEDICARE

## 2023-03-24 VITALS
WEIGHT: 206.2 LBS | SYSTOLIC BLOOD PRESSURE: 122 MMHG | DIASTOLIC BLOOD PRESSURE: 82 MMHG | BODY MASS INDEX: 30.54 KG/M2 | TEMPERATURE: 98.1 F | HEIGHT: 69 IN | OXYGEN SATURATION: 95 % | HEART RATE: 71 BPM

## 2023-03-24 DIAGNOSIS — Z00.00 MEDICARE ANNUAL WELLNESS VISIT, SUBSEQUENT: Primary | ICD-10-CM

## 2023-03-24 PROCEDURE — 97140 MANUAL THERAPY 1/> REGIONS: CPT | Performed by: PHYSICAL THERAPIST

## 2023-03-24 PROCEDURE — G8484 FLU IMMUNIZE NO ADMIN: HCPCS | Performed by: INTERNAL MEDICINE

## 2023-03-24 PROCEDURE — G0439 PPPS, SUBSEQ VISIT: HCPCS | Performed by: INTERNAL MEDICINE

## 2023-03-24 PROCEDURE — 3078F DIAST BP <80 MM HG: CPT | Performed by: INTERNAL MEDICINE

## 2023-03-24 PROCEDURE — 3017F COLORECTAL CA SCREEN DOC REV: CPT | Performed by: INTERNAL MEDICINE

## 2023-03-24 PROCEDURE — 97110 THERAPEUTIC EXERCISES: CPT | Performed by: PHYSICAL THERAPIST

## 2023-03-24 PROCEDURE — 3074F SYST BP LT 130 MM HG: CPT | Performed by: INTERNAL MEDICINE

## 2023-03-24 PROCEDURE — 1123F ACP DISCUSS/DSCN MKR DOCD: CPT | Performed by: INTERNAL MEDICINE

## 2023-03-24 SDOH — ECONOMIC STABILITY: HOUSING INSECURITY
IN THE LAST 12 MONTHS, WAS THERE A TIME WHEN YOU DID NOT HAVE A STEADY PLACE TO SLEEP OR SLEPT IN A SHELTER (INCLUDING NOW)?: NO

## 2023-03-24 SDOH — ECONOMIC STABILITY: INCOME INSECURITY: HOW HARD IS IT FOR YOU TO PAY FOR THE VERY BASICS LIKE FOOD, HOUSING, MEDICAL CARE, AND HEATING?: NOT HARD AT ALL

## 2023-03-24 SDOH — ECONOMIC STABILITY: FOOD INSECURITY: WITHIN THE PAST 12 MONTHS, YOU WORRIED THAT YOUR FOOD WOULD RUN OUT BEFORE YOU GOT MONEY TO BUY MORE.: NEVER TRUE

## 2023-03-24 SDOH — ECONOMIC STABILITY: FOOD INSECURITY: WITHIN THE PAST 12 MONTHS, THE FOOD YOU BOUGHT JUST DIDN'T LAST AND YOU DIDN'T HAVE MONEY TO GET MORE.: NEVER TRUE

## 2023-03-24 ASSESSMENT — LIFESTYLE VARIABLES
HOW OFTEN DURING THE LAST YEAR HAVE YOU FAILED TO DO WHAT WAS NORMALLY EXPECTED FROM YOU BECAUSE OF DRINKING: 0
HOW OFTEN DO YOU HAVE A DRINK CONTAINING ALCOHOL: 2-3 TIMES A WEEK
HOW OFTEN DURING THE LAST YEAR HAVE YOU FOUND THAT YOU WERE NOT ABLE TO STOP DRINKING ONCE YOU HAD STARTED: 0
HOW OFTEN DURING THE LAST YEAR HAVE YOU HAD A FEELING OF GUILT OR REMORSE AFTER DRINKING: 0
HOW MANY STANDARD DRINKS CONTAINING ALCOHOL DO YOU HAVE ON A TYPICAL DAY: 1 OR 2
HOW OFTEN DURING THE LAST YEAR HAVE YOU NEEDED AN ALCOHOLIC DRINK FIRST THING IN THE MORNING TO GET YOURSELF GOING AFTER A NIGHT OF HEAVY DRINKING: 0
HOW OFTEN DURING THE LAST YEAR HAVE YOU BEEN UNABLE TO REMEMBER WHAT HAPPENED THE NIGHT BEFORE BECAUSE YOU HAD BEEN DRINKING: 0
HAS A RELATIVE, FRIEND, DOCTOR, OR ANOTHER HEALTH PROFESSIONAL EXPRESSED CONCERN ABOUT YOUR DRINKING OR SUGGESTED YOU CUT DOWN: 0
HAVE YOU OR SOMEONE ELSE BEEN INJURED AS A RESULT OF YOUR DRINKING: 0

## 2023-03-24 ASSESSMENT — PATIENT HEALTH QUESTIONNAIRE - PHQ9
SUM OF ALL RESPONSES TO PHQ QUESTIONS 1-9: 0
SUM OF ALL RESPONSES TO PHQ QUESTIONS 1-9: 0
4. FEELING TIRED OR HAVING LITTLE ENERGY: 0
9. THOUGHTS THAT YOU WOULD BE BETTER OFF DEAD, OR OF HURTING YOURSELF: 0
SUM OF ALL RESPONSES TO PHQ9 QUESTIONS 1 & 2: 0
6. FEELING BAD ABOUT YOURSELF - OR THAT YOU ARE A FAILURE OR HAVE LET YOURSELF OR YOUR FAMILY DOWN: 0
7. TROUBLE CONCENTRATING ON THINGS, SUCH AS READING THE NEWSPAPER OR WATCHING TELEVISION: 0
8. MOVING OR SPEAKING SO SLOWLY THAT OTHER PEOPLE COULD HAVE NOTICED. OR THE OPPOSITE, BEING SO FIGETY OR RESTLESS THAT YOU HAVE BEEN MOVING AROUND A LOT MORE THAN USUAL: 0
SUM OF ALL RESPONSES TO PHQ QUESTIONS 1-9: 0
SUM OF ALL RESPONSES TO PHQ QUESTIONS 1-9: 0
3. TROUBLE FALLING OR STAYING ASLEEP: 0
1. LITTLE INTEREST OR PLEASURE IN DOING THINGS: 0
10. IF YOU CHECKED OFF ANY PROBLEMS, HOW DIFFICULT HAVE THESE PROBLEMS MADE IT FOR YOU TO DO YOUR WORK, TAKE CARE OF THINGS AT HOME, OR GET ALONG WITH OTHER PEOPLE: 0
2. FEELING DOWN, DEPRESSED OR HOPELESS: 0
5. POOR APPETITE OR OVEREATING: 0

## 2023-03-24 NOTE — PATIENT INSTRUCTIONS
drink a day for women. Too much alcohol can cause health problems. Manage other health problems such as diabetes, high blood pressure, and high cholesterol. If you think you may have a problem with alcohol or drug use, talk to your doctor. Medicines    Take your medicines exactly as prescribed. Call your doctor if you think you are having a problem with your medicine. If your doctor recommends aspirin, take the amount directed each day. Make sure you take aspirin and not another kind of pain reliever, such as acetaminophen (Tylenol). When should you call for help? Call 911 if you have symptoms of a heart attack. These may include:    Chest pain or pressure, or a strange feeling in the chest.     Sweating. Shortness of breath. Pain, pressure, or a strange feeling in the back, neck, jaw, or upper belly or in one or both shoulders or arms. Lightheadedness or sudden weakness. A fast or irregular heartbeat. After you call 911, the  may tell you to chew 1 adult-strength or 2 to 4 low-dose aspirin. Wait for an ambulance. Do not try to drive yourself. Watch closely for changes in your health, and be sure to contact your doctor if you have any problems. Where can you learn more? Go to http://www.callahan.com/ and enter F075 to learn more about \"A Healthy Heart: Care Instructions. \"  Current as of: September 7, 2022               Content Version: 13.6  © 7742-5516 [employer], [employer]orporated. Care instructions adapted under license by [facility] If you have questions about a medical condition or this instruction, always ask your healthcare professional. [place] [address] any warranty or liability for your use of this information. Personalized Preventive Plan for Pb Contreras - 3/24/2023  Medicare offers a range of preventive health benefits.  Some of the tests and screenings are paid in full while other may be subject to a deductible, co-insurance,

## 2023-03-25 NOTE — FLOWSHEET NOTE
activities related to strengthening, flexibility, endurance, ROM for improvements in LE, proximal hip, and core control with self care, mobility, lifting, ambulation. [x] (54511) Provided verbal/tactile cueing for activities related to improving balance, coordination, kinesthetic sense, posture, motor skill, proprioception to assist with LE, proximal hip, and core control in self-care, mobility, lifting, ambulation and eccentric single leg control.      NMR and Therapeutic Activities:    [x] (99410 or 27241) Provided verbal/tactile cueing for activities related to improving balance, coordination, kinesthetic sense, posture, motor skill, proprioception and motor activation to allow for proper function of core, proximal hip and LE with self-care and ADLs and functional mobility.   [] (86513) Gait Re-education- Provided training and instruction to the patient for proper LE, core and proximal hip recruitment and positioning and eccentric body weight control with ambulation re-education including up and down stairs     Home Exercise Program:    [x] (53652) Reviewed/Progressed HEP activities related to strengthening, flexibility, endurance, ROM of core, proximal hip and LE for functional self-care, mobility, lifting and ambulation/stair navigation   [] (16140) Reviewed/Progressed HEP activities related to improving balance, coordination, kinesthetic sense, posture, motor skill, proprioception of core, proximal hip and LE for self-care, mobility, lifting, and ambulation/stair navigation      Manual Treatments:  PROM / STM / Oscillations-Mobs:  G-I, II, III, IV (PA's, Inf., Post.)  [x] (11480) Provided manual therapy to mobilize LE, proximal hip and/or LS spine soft tissue/joints for the purpose of modulating pain, promoting relaxation, increasing ROM, reducing/eliminating soft tissue swelling/inflammation/restriction, improving soft tissue extensibility and allowing for proper ROM for normal function with self-care,

## 2023-03-26 NOTE — FLOWSHEET NOTE
[facility] and [address],  Sports Performance and Rehabilitation, [facility] [address]199 [address]  793 [facility],5Th Floor   [place]  Phone: [phone]  Fax: [phone]       Physical Therapy Treatment Note/ Progress Report:           Date:  3/24/2023     Patient Name:  Nicki Alexander    :  1955  MRN: [id number]  Restrictions/Precautions:    Medical/Treatment Diagnosis Information:     N21.780 (ICD-10-CM) - S/P reverse total shoulder arthroplasty, right   M25.511 (ICD-10-CM) - Right shoulder pain, unspecified chronicity        Insurance/Certification information:     Physician Information:     Has the plan of care been signed (Y/N):        []  Yes  [x]  No     Date of Patient follow up with Physician:       Is this a Progress Report:     []  Yes  [x]  No        If Yes:  Date Range for reporting period:  Beginning  Ending    Progress report will be due (10 Rx or 30 days whichever is less):        Recertification will be due (POC Duration  / 90 days whichever is less):          Visit # Insurance Allowable Auth Required   8  []  Yes []  No        Functional Scale:     Date assessed:        Latex Allergy:  [x]NO      []YES  Preferred Language for Healthcare:   [x]English       []other:      Pain level:  nr/10     SUBJECTIVE:  \"I am doing ok\"    OBJECTIVE: Observation:   120/30  Test measurements:      RESTRICTIONS/PRECAUTIONS: 90/20    Exercises/Interventions:       Therapeutic Ex (12191) Sets/sec Reps Notes/CUES   Fav 4  15'    Ball roll   30x     T bands: rows   Green - 20x     Supine er @ 0  20x    Abd iso  10 x 10\"    Supine elevations (with assist)  20x    Prone: rows, ext's   25x    Supine tricep  25x    Standing bicep   30x     Ll/ld bicep  6'                 Manual Intervention (26295)      Prom/stm   25'                                                                                                                                            Therapeutic

## 2023-03-28 ENCOUNTER — HOSPITAL ENCOUNTER (OUTPATIENT)
Dept: PHYSICAL THERAPY | Age: 68
Setting detail: THERAPIES SERIES
Discharge: HOME | End: 2023-03-28
Payer: MEDICARE

## 2023-03-28 PROCEDURE — 97140 MANUAL THERAPY 1/> REGIONS: CPT | Performed by: PHYSICAL THERAPIST

## 2023-03-28 PROCEDURE — 97110 THERAPEUTIC EXERCISES: CPT | Performed by: PHYSICAL THERAPIST

## 2023-03-30 NOTE — FLOWSHEET NOTE
The [facility] and [address],  Sports Performance and Rehabilitation, [facility] [address] 25[address]  793 [facility],5Th Floor   [place]  Phone: [phone]  Fax: [phone]       Physical Therapy Treatment Note/ Progress Report:           Date:  3/28/2023     Patient Name:  Gale Harper    :  1955  MRN: [id number]  Restrictions/Precautions:    Medical/Treatment Diagnosis Information:     M49.233 (ICD-10-CM) - S/P reverse total shoulder arthroplasty, right   M25.511 (ICD-10-CM) - Right shoulder pain, unspecified chronicity        Insurance/Certification information:     Physician Information:     Has the plan of care been signed (Y/N):        []  Yes  [x]  No     Date of Patient follow up with Physician:       Is this a Progress Report:     []  Yes  [x]  No        If Yes:  Date Range for reporting period:  Beginning  Ending    Progress report will be due (10 Rx or 30 days whichever is less):        Recertification will be due (POC Duration  / 90 days whichever is less):          Visit # Insurance Allowable Auth Required   9  []  Yes []  No        Functional Scale:     Date assessed:        Latex Allergy:  [x]NO      []YES  Preferred Language for Healthcare:   [x]English       []other:      Pain level:  nr/10     SUBJECTIVE:  \"I am feeling pretty good. .. occasionally a bit achy\"    OBJECTIVE: Observation:   120/30  Test measurements:      RESTRICTIONS/PRECAUTIONS: 90/20    Exercises/Interventions:       Therapeutic Ex (54097) Sets/sec Reps Notes/CUES   Fav 4  15'    Ball roll   30x     T bands: rows   Green - 20x     Supine er @ 0  20x    Abd iso  10 x 10\"    Supine elevations (with assist)  20x    Prone: rows, ext's   25x    Supine tricep  25x    Standing bicep   30x     Ll/ld bicep  6'                 Manual Intervention (15479)      Prom/stm   25'

## 2023-03-31 ENCOUNTER — APPOINTMENT (OUTPATIENT)
Dept: PHYSICAL THERAPY | Age: 68
End: 2023-03-31
Payer: MEDICARE

## 2023-04-04 ENCOUNTER — HOSPITAL ENCOUNTER (OUTPATIENT)
Dept: PHYSICAL THERAPY | Age: 68
Setting detail: THERAPIES SERIES
Discharge: HOME | End: 2023-04-04

## 2023-04-05 ENCOUNTER — OFFICE VISIT (OUTPATIENT)
Dept: ORTHOPEDIC SURGERY | Age: 68
End: 2023-04-05

## 2023-04-05 VITALS — WEIGHT: 206 LBS | HEIGHT: 69 IN | BODY MASS INDEX: 30.51 KG/M2

## 2023-04-05 DIAGNOSIS — Z96.611 S/P REVERSE TOTAL SHOULDER ARTHROPLASTY, RIGHT: Primary | ICD-10-CM

## 2023-04-05 NOTE — LETTER
April 5, 2023       Iliana Hankins YOB: 1955   [address], Date of Visit:  4/5/2023       To Whom It May Concern: It is my medical opinion that Ketan Bradshawn may return to work on 04/14/23 no restirctions. If you have any questions or concerns, please don't hesitate to call.     Sincerely,          Shama Leon MD
strengthening   [] Moist heat     [] Sports specific program:   [] Massage         [x] Cryotherapy      [] Electrical stimulation     [] Paraffin  [] Whirlpool  [] TENS    [x] Home exercise program (copy to patient). Perform exercises for:   15     minutes    3      times/day  [x] Supervised physical therapy  Frequency: []  1x week  [x] 2x week  [] 3x week  [] Other:   Duration: [] 2 weeks   [] 4 weeks  [x] 6 weeks  [] Other:     Additional Instructions:     Dominick Gómez MD, PhD

## 2023-04-05 NOTE — FLOWSHEET NOTE
Therapeutic Exercise and NMR EXR  [x] (66521) Provided verbal/tactile cueing for activities related to strengthening, flexibility, endurance, ROM for improvements in LE, proximal hip, and core control with self care, mobility, lifting, ambulation. [x] (18584) Provided verbal/tactile cueing for activities related to improving balance, coordination, kinesthetic sense, posture, motor skill, proprioception to assist with LE, proximal hip, and core control in self-care, mobility, lifting, ambulation and eccentric single leg control.      NMR and Therapeutic Activities:    [x] (37473 or 60264) Provided verbal/tactile cueing for activities related to improving balance, coordination, kinesthetic sense, posture, motor skill, proprioception and motor activation to allow for proper function of core, proximal hip and LE with self-care and ADLs and functional mobility.   [] (30852) Gait Re-education- Provided training and instruction to the patient for proper LE, core and proximal hip recruitment and positioning and eccentric body weight control with ambulation re-education including up and down stairs     Home Exercise Program:    [x] (03483) Reviewed/Progressed HEP activities related to strengthening, flexibility, endurance, ROM of core, proximal hip and LE for functional self-care, mobility, lifting and ambulation/stair navigation   [] (64291) Reviewed/Progressed HEP activities related to improving balance, coordination, kinesthetic sense, posture, motor skill, proprioception of core, proximal hip and LE for self-care, mobility, lifting, and ambulation/stair navigation      Manual Treatments:  PROM / STM / Oscillations-Mobs:  G-I, II, III, IV (PA's, Inf., Post.)  [x] (05474) Provided manual therapy to mobilize LE, proximal hip and/or LS spine soft tissue/joints for the purpose of modulating pain, promoting relaxation, increasing ROM, reducing/eliminating

## 2023-04-05 NOTE — PROGRESS NOTES
Purvi Encarnacion is a pleasant, 75 y.o. patient who is 12 weeks s/p a right reverse total shoulder replacement with latissimus dorsi transfer on 1/9/23. She is doing wonderfully. Impression:  Encounter Diagnosis   Name Primary? S/P reverse total shoulder arthroplasty, right Yes       Office Procedures:  Orders Placed This Encounter   Procedures    [address] (Ortho & Sports performance)- [facility]     Referral Priority:   Routine     Referral Type:   Eval and Treat     Referral Reason:   Specialty Services Required     Requested Specialty:   Physical Therapist     Number of Visits Requested:   1       Treatment Plan:  Purvi Encarnacion is exactly on track in her recovery. We recommend this patient continue in physical therapy twice per week. A new physical therapy letter was documented in [facility] today. We will go ahead and release her back to work on 4/14/23 with no restrictions, however she understands her limitations. She will avoid pushing/pulling or lifting with that arm. We will see Madison Mathur back in 6 weeks and/or as needed. All questions were answered to patient's satisfaction and She was encouraged to call with any further questions or concerns. Alma Concha is in agreement with this plan. 4/5/2023  3:54 PM    Rosa Maria Álvarez ATC  [facility]    During this examination, I, Italia Muro, functioned as a scribe for Dr. Sacha Lopez. The history taking and physical examination were performed by Dr. Florentino Wallace. All counseling during the appointment was performed between the patient and Dr. Florentino Wallace. 4/5/23  ______________  I, Dr. Sacha Lopez, personally performed the services described in this documentation as described by Rosa Maria Álvarez ATC in my presence, and it is both accurate and complete. Samer LISA Wallace MD, PhD  4/5/2023

## 2023-04-06 ENCOUNTER — OFFICE VISIT (OUTPATIENT)
Dept: ENT CLINIC | Age: 68
End: 2023-04-06
Payer: MEDICARE

## 2023-04-06 VITALS
WEIGHT: 205 LBS | HEART RATE: 92 BPM | HEIGHT: 70 IN | BODY MASS INDEX: 29.35 KG/M2 | DIASTOLIC BLOOD PRESSURE: 94 MMHG | SYSTOLIC BLOOD PRESSURE: 155 MMHG

## 2023-04-06 DIAGNOSIS — H61.21 IMPACTED CERUMEN OF RIGHT EAR: Primary | ICD-10-CM

## 2023-04-06 PROCEDURE — 69210 REMOVE IMPACTED EAR WAX UNI: CPT | Performed by: OTOLARYNGOLOGY

## 2023-04-06 NOTE — PROGRESS NOTES
Patient here for cerumen removal. Right ear. PE: Cerumen impaction right. Left clear. Cerumen  Pre operative diagnosis: Cerumen impaction on the right  Post operative diagnosis: Same  Procedure: on the right cerumenectomy    After consent an operating microscope was utilized to visualize the external auditory canals bilaterally. Cerumen was removed with curettes and coffey suctions on the the right side. The tympanic membrane is intact. No fluid visualized in the middle ear. No complications. I attest I performed the entire procedure myself. Follow up as needed.

## 2023-04-21 ENCOUNTER — HOSPITAL ENCOUNTER (OUTPATIENT)
Dept: PHYSICAL THERAPY | Age: 68
Setting detail: THERAPIES SERIES
Discharge: HOME | End: 2023-04-21

## 2023-04-26 ENCOUNTER — HOSPITAL ENCOUNTER (OUTPATIENT)
Dept: PHYSICAL THERAPY | Age: 68
Setting detail: THERAPIES SERIES
Discharge: HOME | End: 2023-04-26
Payer: MEDICARE

## 2023-04-26 PROCEDURE — 97140 MANUAL THERAPY 1/> REGIONS: CPT | Performed by: PHYSICAL THERAPIST

## 2023-04-26 PROCEDURE — 97110 THERAPEUTIC EXERCISES: CPT | Performed by: PHYSICAL THERAPIST

## 2023-04-28 ENCOUNTER — HOSPITAL ENCOUNTER (OUTPATIENT)
Dept: PHYSICAL THERAPY | Age: 68
Setting detail: THERAPIES SERIES
End: 2023-04-28
Payer: MEDICARE

## 2023-04-28 RX ORDER — LOSARTAN POTASSIUM 100 MG/1
100 TABLET ORAL DAILY
Qty: 90 TABLET | Refills: 1 | Status: SHIPPED | OUTPATIENT
Start: 2023-04-28

## 2023-04-28 NOTE — FLOWSHEET NOTE
[facility] and [address],  Sports Performance and Rehabilitation, [facility] 1599 [address]  793 [facility],5Th Floor   [place], [address]  Phone: [phone]  Fax: [phone]       Physical Therapy Treatment Note/ Progress Report:           Date:  2023     Patient Name:  Peter Diggs    :  1955  MRN: [id number]  Restrictions/Precautions:    Medical/Treatment Diagnosis Information:     D37.995 (ICD-10-CM) - S/P reverse total shoulder arthroplasty, right   M25.511 (ICD-10-CM) - Right shoulder pain, unspecified chronicity        Insurance/Certification information:     Physician Information:     Has the plan of care been signed (Y/N):        []  Yes  [x]  No     Date of Patient follow up with Physician:       Is this a Progress Report:     []  Yes  [x]  No        If Yes:  Date Range for reporting period:  Beginning  Ending    Progress report will be due (10 Rx or 30 days whichever is less):        Recertification will be due (POC Duration  / 90 days whichever is less):          Visit # Insurance Allowable Auth Required   12  []  Yes []  No        Functional Scale:     Date assessed:        Latex Allergy:  [x]NO      []YES  Preferred Language for Healthcare:   [x]English       []other:      Pain level:  nr/10     SUBJECTIVE:  \"I am getting better. Vasyl Patella \"    OBJECTIVE: Observation:   135 flexion/35 ER  Test measurements:      RESTRICTIONS/PRECAUTIONS: 140/40    Exercises/Interventions:       Therapeutic Ex (60432) Sets/sec Reps Notes/CUES   Fav 4     Ball roll     Supine alphabet/rhyth stab's   30x     2x/5 x 10\"    T bands: rows   Green - 20x     Supine er @ 0  20x    Abd iso  10 x 10\"    Supine elevations   20x    Prone: rows, ext's   25x    Supine tricep  25x    Standing bicep   30x     Ll/ld bicep  6'     Standing elevations   20x    Wall alphabet   1x    Manual Intervention (83055)      Prom/stm   17'

## 2023-05-02 ENCOUNTER — HOSPITAL ENCOUNTER (OUTPATIENT)
Dept: PHYSICAL THERAPY | Age: 68
Setting detail: THERAPIES SERIES
Discharge: HOME | End: 2023-05-02
Payer: MEDICARE

## 2023-05-02 PROCEDURE — 97110 THERAPEUTIC EXERCISES: CPT | Performed by: PHYSICAL THERAPIST

## 2023-05-02 PROCEDURE — 97140 MANUAL THERAPY 1/> REGIONS: CPT | Performed by: PHYSICAL THERAPIST

## 2023-05-11 ENCOUNTER — TELEPHONE (OUTPATIENT)
Dept: INTERNAL MEDICINE CLINIC | Age: 68
End: 2023-05-11

## 2023-05-11 DIAGNOSIS — M79.89 LEG SWELLING: Primary | ICD-10-CM

## 2023-05-12 ENCOUNTER — HOSPITAL ENCOUNTER (OUTPATIENT)
Dept: VASCULAR LAB | Age: 68
Discharge: HOME | End: 2023-05-12

## 2023-05-12 DIAGNOSIS — M79.89 LEG SWELLING: ICD-10-CM

## 2023-05-12 LAB — D DIMER: 1.43 UG/ML FEU (ref 0–0.6)

## 2023-05-15 ENCOUNTER — TELEPHONE (OUTPATIENT)
Dept: INTERNAL MEDICINE CLINIC | Age: 68
End: 2023-05-15

## 2023-05-15 NOTE — TELEPHONE ENCOUNTER
Patient is wondering what Dr Becky Khanna thinks she should do regarding the continued swelling in her knee, calf, and ankle. Patient states she had the doppler completed that Dr Becky Khanna ordered on Friday and it came back negative. Patient states her pain is not getting better and seems to be worsening. She is wondering what the next step is. Please advise.

## 2023-05-16 ENCOUNTER — OFFICE VISIT (OUTPATIENT)
Dept: INTERNAL MEDICINE CLINIC | Age: 68
End: 2023-05-16

## 2023-05-16 VITALS
HEART RATE: 77 BPM | BODY MASS INDEX: 29.27 KG/M2 | WEIGHT: 204 LBS | SYSTOLIC BLOOD PRESSURE: 137 MMHG | DIASTOLIC BLOOD PRESSURE: 82 MMHG | OXYGEN SATURATION: 97 %

## 2023-05-16 DIAGNOSIS — R93.89 ABNORMAL MRI: ICD-10-CM

## 2023-05-16 DIAGNOSIS — M25.561 RIGHT MEDIAL KNEE PAIN: ICD-10-CM

## 2023-05-16 DIAGNOSIS — I80.11: Primary | ICD-10-CM

## 2023-05-16 RX ORDER — PREDNISONE 20 MG/1
20 TABLET ORAL 2 TIMES DAILY
Qty: 10 TABLET | Refills: 0 | Status: SHIPPED | OUTPATIENT
Start: 2023-05-16 | End: 2023-05-21

## 2023-05-16 NOTE — PATIENT INSTRUCTIONS
Right calf (chronic old dvt)  Compression stockings     RICE  Rest/knee sleeve, ice/ibuprofen, compress/knee sleeve, elevate    Try exercises and get xray and if not improving, see Orthopedics

## 2023-05-16 NOTE — PROGRESS NOTES
despite editing, the text may contain inaccuracies due to incorrect word recognition. If further clarification is needed please contact the office at [phone]          An electronic signature was used to authenticate this note.     --Jessica Christina MD

## 2023-05-17 ENCOUNTER — OFFICE VISIT (OUTPATIENT)
Dept: ORTHOPEDIC SURGERY | Age: 68
End: 2023-05-17
Payer: MEDICARE

## 2023-05-17 VITALS — WEIGHT: 204 LBS | HEIGHT: 70 IN | BODY MASS INDEX: 29.2 KG/M2

## 2023-05-17 DIAGNOSIS — Z96.611 S/P REVERSE TOTAL SHOULDER ARTHROPLASTY, RIGHT: Primary | ICD-10-CM

## 2023-05-17 LAB
ALBUMIN SERPL ELPH-MCNC: 3.8 G/DL (ref 3.1–4.9)
ALPHA1 GLOB SERPL ELPH-MCNC: 0.2 G/DL (ref 0.2–0.4)
ALPHA2 GLOB SERPL ELPH-MCNC: 0.7 G/DL (ref 0.4–1.1)
B-GLOBULIN SERPL ELPH-MCNC: 1.1 G/DL (ref 0.9–1.6)
GAMMA GLOB SERPL ELPH-MCNC: 1 G/DL (ref 0.6–1.8)
PROT SERPL-MCNC: 6.9 G/DL (ref 6.4–8.2)
SPE/IFE INTERPRETATION: NORMAL

## 2023-05-17 PROCEDURE — 3017F COLORECTAL CA SCREEN DOC REV: CPT | Performed by: ORTHOPAEDIC SURGERY

## 2023-05-17 PROCEDURE — 1123F ACP DISCUSS/DSCN MKR DOCD: CPT | Performed by: ORTHOPAEDIC SURGERY

## 2023-05-17 PROCEDURE — G8399 PT W/DXA RESULTS DOCUMENT: HCPCS | Performed by: ORTHOPAEDIC SURGERY

## 2023-05-17 PROCEDURE — 1090F PRES/ABSN URINE INCON ASSESS: CPT | Performed by: ORTHOPAEDIC SURGERY

## 2023-05-17 PROCEDURE — G8417 CALC BMI ABV UP PARAM F/U: HCPCS | Performed by: ORTHOPAEDIC SURGERY

## 2023-05-17 PROCEDURE — 99213 OFFICE O/P EST LOW 20 MIN: CPT | Performed by: ORTHOPAEDIC SURGERY

## 2023-05-17 PROCEDURE — G8427 DOCREV CUR MEDS BY ELIG CLIN: HCPCS | Performed by: ORTHOPAEDIC SURGERY

## 2023-05-17 PROCEDURE — 1036F TOBACCO NON-USER: CPT | Performed by: ORTHOPAEDIC SURGERY

## 2023-05-19 ENCOUNTER — HOSPITAL ENCOUNTER (OUTPATIENT)
Dept: PHYSICAL THERAPY | Age: 68
Setting detail: THERAPIES SERIES
Discharge: HOME | End: 2023-05-19
Payer: MEDICARE

## 2023-05-26 ENCOUNTER — HOSPITAL ENCOUNTER (OUTPATIENT)
Dept: PHYSICAL THERAPY | Age: 68
Setting detail: THERAPIES SERIES
Discharge: HOME | End: 2023-05-26
Payer: MEDICARE

## 2023-05-26 PROCEDURE — 97140 MANUAL THERAPY 1/> REGIONS: CPT | Performed by: PHYSICAL THERAPIST

## 2023-05-26 PROCEDURE — 97110 THERAPEUTIC EXERCISES: CPT | Performed by: PHYSICAL THERAPIST

## 2023-06-05 ENCOUNTER — OFFICE VISIT (OUTPATIENT)
Dept: ENT CLINIC | Age: 68
End: 2023-06-05
Payer: MEDICARE

## 2023-06-05 VITALS
BODY MASS INDEX: 29.86 KG/M2 | WEIGHT: 201.6 LBS | DIASTOLIC BLOOD PRESSURE: 86 MMHG | SYSTOLIC BLOOD PRESSURE: 136 MMHG | TEMPERATURE: 96.9 F | HEIGHT: 69 IN | HEART RATE: 67 BPM

## 2023-06-05 DIAGNOSIS — J01.00 ACUTE NON-RECURRENT MAXILLARY SINUSITIS: Primary | ICD-10-CM

## 2023-06-05 PROCEDURE — 1090F PRES/ABSN URINE INCON ASSESS: CPT | Performed by: OTOLARYNGOLOGY

## 2023-06-05 PROCEDURE — G8428 CUR MEDS NOT DOCUMENT: HCPCS | Performed by: OTOLARYNGOLOGY

## 2023-06-05 PROCEDURE — G8399 PT W/DXA RESULTS DOCUMENT: HCPCS | Performed by: OTOLARYNGOLOGY

## 2023-06-05 PROCEDURE — 3075F SYST BP GE 130 - 139MM HG: CPT | Performed by: OTOLARYNGOLOGY

## 2023-06-05 PROCEDURE — G8417 CALC BMI ABV UP PARAM F/U: HCPCS | Performed by: OTOLARYNGOLOGY

## 2023-06-05 PROCEDURE — 1123F ACP DISCUSS/DSCN MKR DOCD: CPT | Performed by: OTOLARYNGOLOGY

## 2023-06-05 PROCEDURE — 3017F COLORECTAL CA SCREEN DOC REV: CPT | Performed by: OTOLARYNGOLOGY

## 2023-06-05 PROCEDURE — 3079F DIAST BP 80-89 MM HG: CPT | Performed by: OTOLARYNGOLOGY

## 2023-06-05 PROCEDURE — 99214 OFFICE O/P EST MOD 30 MIN: CPT | Performed by: OTOLARYNGOLOGY

## 2023-06-05 PROCEDURE — 1036F TOBACCO NON-USER: CPT | Performed by: OTOLARYNGOLOGY

## 2023-06-05 RX ORDER — PREDNISONE 10 MG/1
40 TABLET ORAL DAILY
Qty: 20 TABLET | Refills: 0 | Status: SHIPPED | OUTPATIENT
Start: 2023-06-05 | End: 2023-06-10

## 2023-06-05 RX ORDER — SULFAMETHOXAZOLE AND TRIMETHOPRIM 400; 80 MG/1; MG/1
1 TABLET ORAL 2 TIMES DAILY
Qty: 20 TABLET | Refills: 0 | Status: SHIPPED | OUTPATIENT
Start: 2023-06-05 | End: 2023-06-15

## 2023-06-05 ASSESSMENT — ENCOUNTER SYMPTOMS
COUGH: 1
NAUSEA: 0
SORE THROAT: 0
SINUS PAIN: 0
DIARRHEA: 0
CHOKING: 0
EYE REDNESS: 0
EYE ITCHING: 0
SINUS PRESSURE: 0
TROUBLE SWALLOWING: 0
VOICE CHANGE: 0
RHINORRHEA: 0
EYE PAIN: 0
FACIAL SWELLING: 0
SHORTNESS OF BREATH: 0

## 2023-06-05 NOTE — PROGRESS NOTES
superficial, deep or posterior cervical adenopathy. Neurological:      Mental Status: She is alert and oriented to person, place, and time. Assessment:       Diagnosis Orders   1. Acute non-recurrent maxillary sinusitis  sulfamethoxazole-trimethoprim (BACTRIM) 400-80 MG per tablet    predniSONE (DELTASONE) 10 MG tablet              Plan:      Acute sinusitis. The patient was counseled for acute sinusitis and received a bactrim and  prescription medication(s) for this diagnosis. The mechanism of action for the prescription(s) were explained/reviewed. The appropriate usage was described and technique for topical use explained if appropriate. Questions from the patient were answered and the prescription(s) were e-prescribed to the appropriate pharmacy.           Satya Clarke MD

## 2023-06-28 ENCOUNTER — HOSPITAL ENCOUNTER (OUTPATIENT)
Dept: PHYSICAL THERAPY | Age: 68
Setting detail: THERAPIES SERIES
Discharge: HOME | End: 2023-06-28
Payer: MEDICARE

## 2023-06-28 PROCEDURE — 97140 MANUAL THERAPY 1/> REGIONS: CPT | Performed by: PHYSICAL THERAPIST

## 2023-06-28 PROCEDURE — 97110 THERAPEUTIC EXERCISES: CPT | Performed by: PHYSICAL THERAPIST

## 2023-07-12 ENCOUNTER — HOSPITAL ENCOUNTER (OUTPATIENT)
Dept: PHYSICAL THERAPY | Age: 68
Setting detail: THERAPIES SERIES
Discharge: HOME | End: 2023-07-12
Payer: MEDICARE

## 2023-07-12 PROCEDURE — 97110 THERAPEUTIC EXERCISES: CPT | Performed by: PHYSICAL THERAPIST

## 2023-07-12 PROCEDURE — 97140 MANUAL THERAPY 1/> REGIONS: CPT | Performed by: PHYSICAL THERAPIST

## 2023-07-24 RX ORDER — LOSARTAN POTASSIUM 100 MG/1
TABLET ORAL
Qty: 90 TABLET | Refills: 1 | Status: SHIPPED | OUTPATIENT
Start: 2023-07-24

## 2023-07-26 ENCOUNTER — HOSPITAL ENCOUNTER (OUTPATIENT)
Dept: PHYSICAL THERAPY | Age: 68
Setting detail: THERAPIES SERIES
Discharge: HOME | End: 2023-07-26
Payer: MEDICARE

## 2023-07-26 PROCEDURE — 97140 MANUAL THERAPY 1/> REGIONS: CPT | Performed by: PHYSICAL THERAPIST

## 2023-07-26 PROCEDURE — 97110 THERAPEUTIC EXERCISES: CPT | Performed by: PHYSICAL THERAPIST

## 2023-07-29 NOTE — FLOWSHEET NOTE
[facility] and [address] [place] [address] [address],  Sports Performance and Rehabilitation, [address] [place] [address]  200 [facility], [address] [place] [address] Avenue  Phone: [phone]  Fax: [phone]       Physical Therapy Treatment Note/ Progress Report:           Date:  2023    Name:  Ofelia Pete    :  1955  MRN: [id number]  Restrictions/Precautions:    Medical/Treatment Diagnosis Information:     Q29.250 (ICD-10-CM) - S/P reverse total shoulder arthroplasty, right   M25.511 (ICD-10-CM) - Right shoulder pain, unspecified chronicity        Insurance/Certification information:     Physician Information:     Has the plan of care been signed (Y/N):        []  Yes  [x]  No     Date of Patient follow up with Physician:       Is this a Progress Report:     []  Yes  [x]  No        If Yes:  Date Range for reporting period:  Beginning  Ending    Progress report will be due (10 Rx or 30 days whichever is less):        Recertification will be due (POC Duration  / 90 days whichever is less):          Visit # Insurance Allowable Auth Required   19  []  Yes []  No        Functional Scale:     Date assessed:        Latex Allergy:  [x]NO      []YES  Preferred Language for Healthcare:   [x]English       []other:      Pain level:  nr/10     SUBJECTIVE:  \"I am improving\"    OBJECTIVE: Observation: IR - L4  4/5 safe zone   Test measurements:      RESTRICTIONS/PRECAUTIONS: 140/40    Exercises/Interventions:       Therapeutic Ex (00671) Sets/sec Reps Notes/CUES   Fav 4     Ball roll     Supine alphabet/rhyth stab's  2x/5 x 10\"    T bands: rows   Green - 20x     Supine er @ 0  20x    Abd iso     Supine elevations   30x    Prone: rows, ext's, m trap (short)   25x    Supine tricep  25x    Standing bicep   30x     Ll/ld bicep     Standing elevations   20x    Wall alphabet   Wall walk     Er @ 0 degrees supine     Scaptions     T bands: ir       Active ir     1x  3 x 10\"    20x     20x    20x yellow    5 x 10\"

## 2023-08-01 ENCOUNTER — HOSPITAL ENCOUNTER (OUTPATIENT)
Dept: PHYSICAL THERAPY | Age: 68
Setting detail: THERAPIES SERIES
Discharge: HOME | End: 2023-08-01
Payer: MEDICARE

## 2023-08-01 PROCEDURE — 97140 MANUAL THERAPY 1/> REGIONS: CPT | Performed by: PHYSICAL THERAPIST

## 2023-08-01 PROCEDURE — 97110 THERAPEUTIC EXERCISES: CPT | Performed by: PHYSICAL THERAPIST

## 2023-08-03 NOTE — FLOWSHEET NOTE
The [facility] and 400 [facility] [address],  Sports Performance and Rehabilitation, [address]  [address]  Phone: [phone]  Fax: [phone]       Physical Therapy Treatment Note/ Progress Report:           Date:  2023    Name:  Johnny Lynn    :  1955  MRN: [id number]  Restrictions/Precautions:    Medical/Treatment Diagnosis Information:     L44.194 (ICD-10-CM) - S/P reverse total shoulder arthroplasty, right   M25.511 (ICD-10-CM) - Right shoulder pain, unspecified chronicity        Insurance/Certification information:     Physician Information:     Has the plan of care been signed (Y/N):        []  Yes  [x]  No     Date of Patient follow up with Physician:       Is this a Progress Report:     []  Yes  [x]  No        If Yes:  Date Range for reporting period:  Beginning  Ending    Progress report will be due (10 Rx or 30 days whichever is less):        Recertification will be due (POC Duration  / 90 days whichever is less):          Visit # Insurance Allowable Auth Required   20  []  Yes []  No        Functional Scale:     Date assessed:        Latex Allergy:  [x]NO      []YES  Preferred Language for Healthcare:   [x]English       []other:      Pain level:  nr/10     SUBJECTIVE:  \"I am feeling pretty good. Diamond Hamman \"     OBJECTIVE: Observation: IR - L4  4/5 safe zone   Test measurements:      RESTRICTIONS/PRECAUTIONS: 140/40    Exercises/Interventions:       Therapeutic Ex () Sets/sec Reps Notes/CUES   Fav 4     Ball roll     Supine alphabet/rhyth stab's  2x/5 x 10\"    T bands: rows   Green - 20x     Supine er @ 0  20x    Abd iso     Supine elevations   30x    Prone: rows, ext's, m trap (short)   25x    Supine tricep  25x    Standing bicep   30x     Ll/ld bicep     Standing elevations   20x    Wall alphabet   Wall walk     Er @ 0 degrees supine     Scaptions     T bands: ir       Active ir     1x  3 x 10\"    20x     20x    20x

## 2023-08-07 ENCOUNTER — HOSPITAL ENCOUNTER (OUTPATIENT)
Dept: PHYSICAL THERAPY | Age: 68
Setting detail: THERAPIES SERIES
Discharge: HOME | End: 2023-08-07
Payer: MEDICARE

## 2023-08-07 PROCEDURE — 97110 THERAPEUTIC EXERCISES: CPT | Performed by: PHYSICAL THERAPIST

## 2023-08-07 PROCEDURE — 97140 MANUAL THERAPY 1/> REGIONS: CPT | Performed by: PHYSICAL THERAPIST

## 2023-08-09 DIAGNOSIS — E78.5 HYPERLIPIDEMIA, UNSPECIFIED HYPERLIPIDEMIA TYPE: ICD-10-CM

## 2023-08-09 RX ORDER — ROSUVASTATIN CALCIUM 40 MG/1
TABLET, COATED ORAL
Qty: 90 TABLET | Refills: 1 | Status: SHIPPED | OUTPATIENT
Start: 2023-08-09

## 2023-08-10 RX ORDER — ATENOLOL 50 MG/1
TABLET ORAL
Qty: 90 TABLET | Refills: 1 | Status: SHIPPED | OUTPATIENT
Start: 2023-08-10

## 2023-08-17 ENCOUNTER — OFFICE VISIT (OUTPATIENT)
Dept: CARDIOLOGY CLINIC | Age: 68
End: 2023-08-17
Payer: MEDICARE

## 2023-08-17 VITALS
BODY MASS INDEX: 30.45 KG/M2 | SYSTOLIC BLOOD PRESSURE: 132 MMHG | HEART RATE: 70 BPM | WEIGHT: 206.2 LBS | DIASTOLIC BLOOD PRESSURE: 80 MMHG

## 2023-08-17 DIAGNOSIS — E78.5 HYPERLIPIDEMIA, UNSPECIFIED HYPERLIPIDEMIA TYPE: ICD-10-CM

## 2023-08-17 DIAGNOSIS — I10 ESSENTIAL HYPERTENSION: Primary | ICD-10-CM

## 2023-08-17 PROCEDURE — 1123F ACP DISCUSS/DSCN MKR DOCD: CPT | Performed by: INTERNAL MEDICINE

## 2023-08-17 PROCEDURE — 3075F SYST BP GE 130 - 139MM HG: CPT | Performed by: INTERNAL MEDICINE

## 2023-08-17 PROCEDURE — G8399 PT W/DXA RESULTS DOCUMENT: HCPCS | Performed by: INTERNAL MEDICINE

## 2023-08-17 PROCEDURE — 3017F COLORECTAL CA SCREEN DOC REV: CPT | Performed by: INTERNAL MEDICINE

## 2023-08-17 PROCEDURE — 3079F DIAST BP 80-89 MM HG: CPT | Performed by: INTERNAL MEDICINE

## 2023-08-17 PROCEDURE — 99213 OFFICE O/P EST LOW 20 MIN: CPT | Performed by: INTERNAL MEDICINE

## 2023-08-17 PROCEDURE — G8427 DOCREV CUR MEDS BY ELIG CLIN: HCPCS | Performed by: INTERNAL MEDICINE

## 2023-08-17 PROCEDURE — G8417 CALC BMI ABV UP PARAM F/U: HCPCS | Performed by: INTERNAL MEDICINE

## 2023-08-17 PROCEDURE — 1090F PRES/ABSN URINE INCON ASSESS: CPT | Performed by: INTERNAL MEDICINE

## 2023-08-17 PROCEDURE — 1036F TOBACCO NON-USER: CPT | Performed by: INTERNAL MEDICINE

## 2023-08-17 ASSESSMENT — ENCOUNTER SYMPTOMS
SHORTNESS OF BREATH: 0
CHEST TIGHTNESS: 0

## 2023-08-17 NOTE — PROGRESS NOTES
Subjective:      Patient ID: Edy Domingo is a 70 y.o. female. HPI  Follow up abn gita/HTN/Lipids. Shoulder surgery went well. No complaints. Walks daily. No exertional sob/chest pain. No pnd or orthopnea. No palp/tachy/syncope. Wt stable. Bp good at home.      Past Medical History:   Diagnosis Date    Abnormal EKG     saw cardio, wnl stress test 3/20    Allergic rhinitis     spring trees    Arthritis     Bursitis 3/2022    Left elbow    Depression     Depression     Endometriosis     Essential hypertension     Headache     History of thrombophlebitis     right leg    Hyperlipidemia     DEJA on CPAP     cpap    Osteoarthritis 2022    Plantar fasciitis     Recurrent sinus infections     Recurrent upper respiratory infection (URI)      Past Surgical History:   Procedure Laterality Date    ABDOMEN SURGERY  1/2010    Colon refraction    ANUS SURGERY      anal fissure    COLONOSCOPY  09/2014    recheck 9/19    COLONOSCOPY  04/2019    fu 10 yrs    CYST REMOVAL      right foot    FOOT SURGERY  11/1997    Cyst removal    HIP SURGERY  8/2011    Right hip replacement    JOINT REPLACEMENT  2011    PARTIAL HYSTERECTOMY (CERVIX NOT REMOVED)  2006    still w cervix and ovaries    SEPTOPLASTY      SHOULDER SURGERY Right 01/09/2023    RIGHT REVERSE TOTAL SHOULDER REPLACEMENT WITH LATISSIMUS DORSI TRANSFER performed by Venkat Urbina MD at [address] [place] [address] Rd  09/2007    perforated colon     TONSILLECTOMY AND ADENOIDECTOMY      TOTAL HIP ARTHROPLASTY Right 2011     Social History     Socioeconomic History    Marital status: Single     Spouse name: Not on file    Number of children: 0    Years of education: Not on file    Highest education level: Not on file   Occupational History    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

## 2023-09-26 ENCOUNTER — OFFICE VISIT (OUTPATIENT)
Dept: INTERNAL MEDICINE CLINIC | Age: 68
End: 2023-09-26

## 2023-09-26 VITALS
WEIGHT: 208 LBS | BODY MASS INDEX: 30.72 KG/M2 | SYSTOLIC BLOOD PRESSURE: 136 MMHG | DIASTOLIC BLOOD PRESSURE: 72 MMHG | HEART RATE: 65 BPM | OXYGEN SATURATION: 99 %

## 2023-09-26 DIAGNOSIS — I10 ESSENTIAL HYPERTENSION: ICD-10-CM

## 2023-09-26 DIAGNOSIS — M79.671 FOOT PAIN, BILATERAL: ICD-10-CM

## 2023-09-26 DIAGNOSIS — F32.A DEPRESSION, UNSPECIFIED DEPRESSION TYPE: ICD-10-CM

## 2023-09-26 DIAGNOSIS — E78.5 HYPERLIPIDEMIA, UNSPECIFIED HYPERLIPIDEMIA TYPE: Primary | ICD-10-CM

## 2023-09-26 DIAGNOSIS — M79.672 FOOT PAIN, BILATERAL: ICD-10-CM

## 2023-09-26 NOTE — PATIENT INSTRUCTIONS
Covid and RSV shots  Shingrix shots    Podiatrist ok  Shoe inserts at [employer]  Lidocaine ointment, B vitamins    Carbamide peroxide

## 2023-09-26 NOTE — PROGRESS NOTES
tablet 1    rosuvastatin (CRESTOR) 40 MG tablet TAKE 1 TABLET BY MOUTH EVERY DAY 90 tablet 1    losartan (COZAAR) 100 MG tablet TAKE 1 TABLET BY MOUTH EVERY DAY 90 tablet 1    venlafaxine (EFFEXOR XR) 150 MG extended release capsule TAKE 1 CAPSULE BY MOUTH EVERY DAY 90 capsule 1    amLODIPine (NORVASC) 5 MG tablet TAKE 1 TABLET BY MOUTH EVERY DAY 90 tablet 3    Cholecalciferol 50 MCG (2000 UT) TABS Take 0.5 tablets by mouth daily      calcium citrate (CALCITRATE) 250 MG TABS tablet Take 1 tablet by mouth daily 60 tablet 0    Omega-3 Fatty Acids (FISH OIL) 1000 MG CAPS Take 1 capsule by mouth daily      Cyanocobalamin (VITAMIN B12 PO) Take by mouth      Multiple Vitamins-Minerals (MULTIVITAMIN ADULT PO) Take by mouth      vitamin C (ASCORBIC ACID) 500 MG tablet Take 1 tablet by mouth daily       No current facility-administered medications for this visit. Physical Exam  HENT:      Ears:      Comments: Bilateral cerumen but not impacted  Cardiovascular:      Rate and Rhythm: Normal rate and regular rhythm. Pulmonary:      Effort: Pulmonary effort is normal.      Breath sounds: Normal breath sounds. Musculoskeletal:      Right lower leg: No edema. Left lower leg: No edema. Comments: Feet with callouses and bunions, neg shepherd's neuroma or tenderness at heel   Neurological:      Mental Status: She is oriented to person, place, and time. Gait: Gait normal.   Psychiatric:         Mood and Affect: Mood normal.       135/78          This note was generated completely or in part utilizing Dragon dictation speech recognition software. Occasionally, words are mistranscribed and despite editing, the text may contain inaccuracies due to incorrect word recognition. If further clarification is needed please contact the office at [phone]          An electronic signature was used to authenticate this note.     --Prashanth Xie MD

## 2023-10-12 ENCOUNTER — TELEPHONE (OUTPATIENT)
Dept: INTERNAL MEDICINE CLINIC | Age: 68
End: 2023-10-12

## 2023-10-12 DIAGNOSIS — E78.5 HYPERLIPIDEMIA, UNSPECIFIED HYPERLIPIDEMIA TYPE: ICD-10-CM

## 2023-10-12 DIAGNOSIS — I10 ESSENTIAL HYPERTENSION: ICD-10-CM

## 2023-10-12 LAB
ALBUMIN SERPL-MCNC: 5.1 G/DL (ref 3.4–5)
ALBUMIN/GLOB SERPL: 2.8 {RATIO} (ref 1.1–2.2)
ALP SERPL-CCNC: 97 U/L (ref 40–129)
ALT SERPL-CCNC: 39 U/L (ref 10–40)
ANION GAP SERPL CALCULATED.3IONS-SCNC: 10 MMOL/L (ref 3–16)
AST SERPL-CCNC: 42 U/L (ref 15–37)
BILIRUB SERPL-MCNC: 0.3 MG/DL (ref 0–1)
BUN SERPL-MCNC: 17 MG/DL (ref 7–20)
CALCIUM SERPL-MCNC: 9.3 MG/DL (ref 8.3–10.6)
CHLORIDE SERPL-SCNC: 102 MMOL/L (ref 99–110)
CHOLEST SERPL-MCNC: 186 MG/DL (ref 0–199)
CO2 SERPL-SCNC: 28 MMOL/L (ref 21–32)
CREAT SERPL-MCNC: 0.7 MG/DL (ref 0.6–1.2)
GFR SERPLBLD CREATININE-BSD FMLA CKD-EPI: >60 ML/MIN/{1.73_M2}
GLUCOSE SERPL-MCNC: 143 MG/DL (ref 70–99)
HDLC SERPL-MCNC: 58 MG/DL (ref 40–60)
LDLC SERPL CALC-MCNC: ABNORMAL MG/DL
LDLC SERPL-MCNC: 84 MG/DL
POTASSIUM SERPL-SCNC: 4.3 MMOL/L (ref 3.5–5.1)
PROT SERPL-MCNC: 6.9 G/DL (ref 6.4–8.2)
SODIUM SERPL-SCNC: 140 MMOL/L (ref 136–145)
TRIGL SERPL-MCNC: 310 MG/DL (ref 0–150)
VLDLC SERPL CALC-MCNC: ABNORMAL MG/DL

## 2023-10-18 LAB
BASOPHILS ABSOLUTE: 0.02 /ΜL
BASOPHILS RELATIVE PERCENT: 0.4 %
EOSINOPHILS ABSOLUTE: 0.1 /ΜL
EOSINOPHILS RELATIVE PERCENT: 2.1 %
HCT VFR BLD CALC: 41.2 % (ref 36–46)
HEMOGLOBIN: 13.7 G/DL (ref 12–16)
LYMPHOCYTES ABSOLUTE: 1.46 /ΜL
LYMPHOCYTES RELATIVE PERCENT: 31.1 %
MCH RBC QN AUTO: 30.6 PG
MCHC RBC AUTO-ENTMCNC: 33.3 G/DL
MCV RBC AUTO: 92.2 FL
MONOCYTES ABSOLUTE: 0.49 /ΜL
MONOCYTES RELATIVE PERCENT: 10.4 %
NEUTROPHILS ABSOLUTE: 2.63 /ΜL
NEUTROPHILS RELATIVE PERCENT: 26 %
PLATELET # BLD: 170 K/ΜL
PMV BLD AUTO: ABNORMAL FL
RBC # BLD: 4.47 10^6/ΜL
WBC # BLD: 4.7 10^3/ML

## 2023-10-25 RX ORDER — VENLAFAXINE HYDROCHLORIDE 150 MG/1
150 CAPSULE, EXTENDED RELEASE ORAL DAILY
Qty: 30 CAPSULE | Refills: 0 | Status: SHIPPED | OUTPATIENT
Start: 2023-10-25

## 2023-10-25 NOTE — TELEPHONE ENCOUNTER
Patient is out of town and forgot her medication. Patient will be out in [place] for the next 2 weeks.        venlafaxine (EFFEXOR XR) 150 MG extended release capsule [[id number]]    [address] [place] [address]  Get directions  [phone]  Today's hours  Store & Photo: Open, closes at 10:00 PM  Pharmacy: Open, closes at 7:00 PM  Pharmacy closes for lunch from 12:30 PM to 1:00 PM

## 2023-10-25 NOTE — TELEPHONE ENCOUNTER
Its a [facility] pharmacy in [place]     Last appointment: 9/26/2023  Next appointment: 3/25/2024  Last refill: 6/13/23      [id number] [place], [address]  [phone]

## 2023-11-02 ENCOUNTER — OFFICE VISIT (OUTPATIENT)
Dept: ENT CLINIC | Age: 68
End: 2023-11-02
Payer: MEDICARE

## 2023-11-02 VITALS
WEIGHT: 201 LBS | HEART RATE: 67 BPM | HEIGHT: 69 IN | SYSTOLIC BLOOD PRESSURE: 138 MMHG | DIASTOLIC BLOOD PRESSURE: 87 MMHG | BODY MASS INDEX: 29.77 KG/M2 | TEMPERATURE: 97.5 F

## 2023-11-02 DIAGNOSIS — J01.00 ACUTE NON-RECURRENT MAXILLARY SINUSITIS: Primary | ICD-10-CM

## 2023-11-02 DIAGNOSIS — L73.9 NASAL FOLLICULITIS: ICD-10-CM

## 2023-11-02 PROCEDURE — 1090F PRES/ABSN URINE INCON ASSESS: CPT | Performed by: OTOLARYNGOLOGY

## 2023-11-02 PROCEDURE — 99214 OFFICE O/P EST MOD 30 MIN: CPT | Performed by: OTOLARYNGOLOGY

## 2023-11-02 PROCEDURE — 3079F DIAST BP 80-89 MM HG: CPT | Performed by: OTOLARYNGOLOGY

## 2023-11-02 PROCEDURE — G8428 CUR MEDS NOT DOCUMENT: HCPCS | Performed by: OTOLARYNGOLOGY

## 2023-11-02 PROCEDURE — 3017F COLORECTAL CA SCREEN DOC REV: CPT | Performed by: OTOLARYNGOLOGY

## 2023-11-02 PROCEDURE — G8417 CALC BMI ABV UP PARAM F/U: HCPCS | Performed by: OTOLARYNGOLOGY

## 2023-11-02 PROCEDURE — 3075F SYST BP GE 130 - 139MM HG: CPT | Performed by: OTOLARYNGOLOGY

## 2023-11-02 PROCEDURE — G8484 FLU IMMUNIZE NO ADMIN: HCPCS | Performed by: OTOLARYNGOLOGY

## 2023-11-02 PROCEDURE — 1123F ACP DISCUSS/DSCN MKR DOCD: CPT | Performed by: OTOLARYNGOLOGY

## 2023-11-02 PROCEDURE — G8399 PT W/DXA RESULTS DOCUMENT: HCPCS | Performed by: OTOLARYNGOLOGY

## 2023-11-02 PROCEDURE — 1036F TOBACCO NON-USER: CPT | Performed by: OTOLARYNGOLOGY

## 2023-11-02 RX ORDER — SULFAMETHOXAZOLE AND TRIMETHOPRIM 400; 80 MG/1; MG/1
1 TABLET ORAL 2 TIMES DAILY
Qty: 20 TABLET | Refills: 0 | Status: SHIPPED | OUTPATIENT
Start: 2023-11-02 | End: 2023-11-12

## 2023-11-02 RX ORDER — PREDNISONE 10 MG/1
40 TABLET ORAL DAILY
Qty: 20 TABLET | Refills: 0 | Status: SHIPPED | OUTPATIENT
Start: 2023-11-02 | End: 2023-11-07

## 2023-11-02 ASSESSMENT — ENCOUNTER SYMPTOMS
SORE THROAT: 0
TROUBLE SWALLOWING: 0
RHINORRHEA: 0
EYE ITCHING: 0
EYE REDNESS: 0
COUGH: 0
CHOKING: 0
SHORTNESS OF BREATH: 0
VOICE CHANGE: 0
SINUS PAIN: 0
EYE PAIN: 0
SINUS PRESSURE: 0
DIARRHEA: 0
NAUSEA: 0
FACIAL SWELLING: 0

## 2023-11-02 NOTE — PROGRESS NOTES
Subjective:      Patient ID: Duncan Bamberger is a 70 y.o. female. HPI  Chief Complaint   Patient presents with    sinusitis  History of Present Bayron Hooks is a(n) 70 y.o. female who presents with a long history of chronic sinusitis. Surgery in [facility] Has been well until over the weekend. Moderate to severe nasal congestion, blowing green, full ears and post nasal drip. Concern for infection.      Patient Active Problem List   Diagnosis    Depression    Endometriosis    Essential hypertension    Hyperlipidemia    Obstructive sleep apnea syndrome    Abnormal myocardial perfusion study     Past Surgical History:   Procedure Laterality Date    ABDOMEN SURGERY  1/2010    Colon refraction    ANUS SURGERY      anal fissure    COLONOSCOPY  09/2014    recheck 9/19    COLONOSCOPY  04/2019    fu 10 yrs    CYST REMOVAL      right foot    FOOT SURGERY  11/1997    Cyst removal    HIP SURGERY  8/2011    Right hip replacement    JOINT REPLACEMENT  2011    PARTIAL HYSTERECTOMY (CERVIX NOT REMOVED)  2006    still w cervix and ovaries    SEPTOPLASTY      SHOULDER SURGERY Right 01/09/2023    RIGHT REVERSE TOTAL SHOULDER REPLACEMENT WITH LATISSIMUS DORSI TRANSFER performed by Paul Anderson MD at [facility] [address]  09/2007    perforated colon     TONSILLECTOMY AND ADENOIDECTOMY      TOTAL HIP ARTHROPLASTY Right 2011     Family History   Problem Relation Age of Onset    Lung Cancer Mother     Sleep Apnea Mother     Cancer Mother         Lung    Prostate Cancer Father     Prostate Cancer Brother     Cancer Brother         Prostate    Breast Cancer Maternal Cousin     Dementia Paternal Aunt     Osteoporosis Maternal Grandmother      Social History     Socioeconomic History    Marital status: Single     Spouse name: Not on file    Number of children: 0    Years of education: Not on file    Highest education level: Not on file   Occupational History    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

## 2023-11-06 ENCOUNTER — HOSPITAL ENCOUNTER (OUTPATIENT)
Dept: ULTRASOUND IMAGING | Age: 68
Discharge: HOME | End: 2023-11-06
Payer: MEDICARE

## 2023-11-06 DIAGNOSIS — R74.01 TRANSAMINITIS: ICD-10-CM

## 2023-11-06 PROCEDURE — 76705 ECHO EXAM OF ABDOMEN: CPT

## 2023-11-09 DIAGNOSIS — K76.0 FATTY LIVER: ICD-10-CM

## 2023-11-09 DIAGNOSIS — R16.0 LIVER MASS: Primary | ICD-10-CM

## 2023-11-15 ENCOUNTER — OFFICE VISIT (OUTPATIENT)
Dept: ORTHOPEDIC SURGERY | Age: 68
End: 2023-11-15

## 2023-11-15 VITALS — BODY MASS INDEX: 29.77 KG/M2 | WEIGHT: 201 LBS | HEIGHT: 69 IN

## 2023-11-15 DIAGNOSIS — Z96.611 STATUS POST REVERSE ARTHROPLASTY OF SHOULDER, RIGHT: ICD-10-CM

## 2023-11-15 DIAGNOSIS — M25.511 RIGHT SHOULDER PAIN, UNSPECIFIED CHRONICITY: Primary | ICD-10-CM

## 2023-11-15 NOTE — PROGRESS NOTES
[address]  History and Physical  Shoulder Pain    Date:  11/15/2023    Name:  Ritchie Chatman  Address:   [facility] [address]    :  1955      Age:   70 y.o.    SSN:  [id number]      Medical Record Number:  [id number]    Reason for Visit:    Shoulder Pain (F/U RIGHT SHOULDER)      HPI:   Ritchie Chatman is a 70 y.o. female who presents to our office today for follow up of the right shoulder. Patient presents approximately 10-1/2 months out from right shoulder reverse arthroplasty with latissimus dorsi transfer performed on 2023. Overall,  she reports that she is doing great at this time. No pain associated with right shoulder. She reports good range of motion nearly equivalent to the contralateral side. Functionally, she does everything she would like to do with her right arm. Pain Assessment  Location of Pain: Shoulder  Location Modifiers: Right  Severity of Pain: 0  Limiting Behavior: No  Relieving Factors: Rest  Work-Related Injury: No  Are there other pain locations you wish to document?: No    No notes on file    Review of Systems:  A 14 point review of systems available in the scanned medical record as documented by the patient and reviewed on 11/15/2023. The review is negative with the exception of those things mentioned in the History of Present Illness and Past Medical History. Past Medical History:  Patient's medications, allergies, past medical, surgical, social and family histories were reviewed and updated as appropriate. Allergies:   Allergies   Allergen Reactions    Cephalexin Rash    Cephalosporins Hives and Shortness Of Breath    Penicillins Anaphylaxis and Rash    Atorvastatin      Muscle weakness    Hazelnut (Filbert) Allergy Skin Test     Lisinopril      Itching Sensation in throat; full feeling in throat    Peanut-Containing Drug Products        Physical Exam:  There were no vitals filed for this

## 2023-11-17 DIAGNOSIS — E78.5 HYPERLIPIDEMIA, UNSPECIFIED HYPERLIPIDEMIA TYPE: ICD-10-CM

## 2023-11-17 RX ORDER — FENOFIBRATE 145 MG/1
145 TABLET, COATED ORAL DAILY
Qty: 90 TABLET | Refills: 1 | Status: SHIPPED | OUTPATIENT
Start: 2023-11-17

## 2023-11-17 RX ORDER — VENLAFAXINE HYDROCHLORIDE 150 MG/1
150 CAPSULE, EXTENDED RELEASE ORAL DAILY
Qty: 90 CAPSULE | Refills: 1 | Status: SHIPPED | OUTPATIENT
Start: 2023-11-17

## 2023-11-17 NOTE — TELEPHONE ENCOUNTER
Last appointment: 9/26/2023  Next appointment: 3/25/2024  Last refill: 1013/2023  & 10/25/2023  Needs 90 day rx

## 2023-11-30 ENCOUNTER — HOSPITAL ENCOUNTER (OUTPATIENT)
Dept: MRI IMAGING | Age: 68
Discharge: HOME | End: 2023-11-30
Payer: MEDICARE

## 2023-11-30 DIAGNOSIS — K76.0 FATTY LIVER: ICD-10-CM

## 2023-11-30 DIAGNOSIS — R16.0 LIVER MASS: ICD-10-CM

## 2023-11-30 PROCEDURE — A9581 GADOXETATE DISODIUM INJ: HCPCS | Performed by: INTERNAL MEDICINE

## 2023-11-30 PROCEDURE — 74183 MRI ABD W/O CNTR FLWD CNTR: CPT

## 2023-11-30 PROCEDURE — 6360000004 HC RX CONTRAST MEDICATION: Performed by: INTERNAL MEDICINE

## 2023-11-30 RX ORDER — SODIUM CHLORIDE 9 MG/ML
10 INJECTION INTRAVENOUS 2 TIMES DAILY
Status: DISCONTINUED | OUTPATIENT
Start: 2023-11-30 | End: 2023-12-01 | Stop reason: HOSPADM

## 2023-11-30 RX ADMIN — GADOXETATE DISODIUM 10 ML: 181.43 INJECTION, SOLUTION INTRAVENOUS at 18:42

## 2024-02-14 DIAGNOSIS — E78.5 HYPERLIPIDEMIA, UNSPECIFIED HYPERLIPIDEMIA TYPE: ICD-10-CM

## 2024-02-14 RX ORDER — ROSUVASTATIN CALCIUM 40 MG/1
TABLET, COATED ORAL
Qty: 90 TABLET | Refills: 1 | Status: SHIPPED | OUTPATIENT
Start: 2024-02-14

## 2024-02-14 RX ORDER — ATENOLOL 50 MG/1
TABLET ORAL
Qty: 90 TABLET | Refills: 1 | Status: SHIPPED | OUTPATIENT
Start: 2024-02-14

## 2024-02-14 RX ORDER — AMLODIPINE BESYLATE 5 MG/1
TABLET ORAL
Qty: 90 TABLET | Refills: 1 | Status: SHIPPED | OUTPATIENT
Start: 2024-02-14

## 2024-02-14 NOTE — TELEPHONE ENCOUNTER
Medication:   Requested Prescriptions     Pending Prescriptions Disp Refills    rosuvastatin (CRESTOR) 40 MG tablet [Pharmacy Med Name: ROSUVASTATIN CALCIUM 40 MG TAB] 90 tablet 1     Sig: TAKE 1 TABLET BY MOUTH EVERY DAY    atenolol (TENORMIN) 50 MG tablet [Pharmacy Med Name: ATENOLOL 50 MG TABLET] 90 tablet 1     Sig: TAKE 1 TABLET BY MOUTH EVERY DAY     Last Filled:  #90 of each with 1 refill on 8/10/23    Last appt: 9/26/2023   Next appt: 3/25/2024    Last OARRS:        No data to display

## 2024-03-12 RX ORDER — LOSARTAN POTASSIUM 100 MG/1
TABLET ORAL
Qty: 90 TABLET | Refills: 1 | Status: SHIPPED | OUTPATIENT
Start: 2024-03-12

## 2024-03-12 NOTE — TELEPHONE ENCOUNTER
Medication:   Requested Prescriptions     Pending Prescriptions Disp Refills    losartan (COZAAR) 100 MG tablet [Pharmacy Med Name: LOSARTAN POTASSIUM 100 MG TAB] 90 tablet 1     Sig: TAKE 1 TABLET BY MOUTH EVERY DAY     Last Filled:  # 90 with 1 refill on 7/24/23    Last appt: 9/26/2023   Next appt: 3/25/2024    Last OARRS:        No data to display

## 2024-03-25 ENCOUNTER — OFFICE VISIT (OUTPATIENT)
Dept: INTERNAL MEDICINE CLINIC | Age: 69
End: 2024-03-25

## 2024-03-25 VITALS
BODY MASS INDEX: 30.52 KG/M2 | DIASTOLIC BLOOD PRESSURE: 80 MMHG | HEART RATE: 62 BPM | OXYGEN SATURATION: 99 % | WEIGHT: 206.7 LBS | SYSTOLIC BLOOD PRESSURE: 150 MMHG

## 2024-03-25 DIAGNOSIS — I80.11: ICD-10-CM

## 2024-03-25 DIAGNOSIS — R09.89 THROAT CLEARING: ICD-10-CM

## 2024-03-25 DIAGNOSIS — E66.9 DIABETES MELLITUS TYPE 2 IN OBESE: ICD-10-CM

## 2024-03-25 DIAGNOSIS — E11.69 DIABETES MELLITUS TYPE 2 IN OBESE: ICD-10-CM

## 2024-03-25 DIAGNOSIS — E78.5 HYPERLIPIDEMIA, UNSPECIFIED HYPERLIPIDEMIA TYPE: ICD-10-CM

## 2024-03-25 DIAGNOSIS — Z91.81 AT HIGH RISK FOR FALLS: ICD-10-CM

## 2024-03-25 DIAGNOSIS — R73.9 HYPERGLYCEMIA: ICD-10-CM

## 2024-03-25 DIAGNOSIS — I10 ESSENTIAL HYPERTENSION: Primary | ICD-10-CM

## 2024-03-25 LAB — HBA1C MFR BLD: 6.5 %

## 2024-03-25 RX ORDER — OMEGA-3-ACID ETHYL ESTERS 1 G/1
2 CAPSULE, LIQUID FILLED ORAL 2 TIMES DAILY
Qty: 120 CAPSULE | Refills: 0 | Status: SHIPPED | OUTPATIENT
Start: 2024-03-25 | End: 2024-04-24

## 2024-03-25 RX ORDER — METFORMIN HYDROCHLORIDE 500 MG/1
500 TABLET, EXTENDED RELEASE ORAL
Qty: 30 TABLET | Refills: 2 | Status: SHIPPED | OUTPATIENT
Start: 2024-03-25

## 2024-03-25 RX ORDER — AMLODIPINE BESYLATE 5 MG/1
7.5 TABLET ORAL DAILY
Qty: 135 TABLET | Refills: 0 | Status: SHIPPED | OUTPATIENT
Start: 2024-03-25 | End: 2024-06-23

## 2024-03-25 RX ORDER — GUAIFENESIN 600 MG/1
600 TABLET, EXTENDED RELEASE ORAL 2 TIMES DAILY PRN
Qty: 60 TABLET | Refills: 0 | Status: SHIPPED | OUTPATIENT
Start: 2024-03-25 | End: 2024-04-24

## 2024-03-25 RX ORDER — METFORMIN HYDROCHLORIDE 500 MG/1
500 TABLET, EXTENDED RELEASE ORAL
Qty: 30 TABLET | Refills: 2 | Status: SHIPPED | OUTPATIENT
Start: 2024-03-25 | End: 2024-03-25 | Stop reason: SDUPTHER

## 2024-03-25 SDOH — ECONOMIC STABILITY: FOOD INSECURITY: WITHIN THE PAST 12 MONTHS, THE FOOD YOU BOUGHT JUST DIDN'T LAST AND YOU DIDN'T HAVE MONEY TO GET MORE.: NEVER TRUE

## 2024-03-25 SDOH — ECONOMIC STABILITY: FOOD INSECURITY: WITHIN THE PAST 12 MONTHS, YOU WORRIED THAT YOUR FOOD WOULD RUN OUT BEFORE YOU GOT MONEY TO BUY MORE.: NEVER TRUE

## 2024-03-25 SDOH — ECONOMIC STABILITY: INCOME INSECURITY: HOW HARD IS IT FOR YOU TO PAY FOR THE VERY BASICS LIKE FOOD, HOUSING, MEDICAL CARE, AND HEATING?: NOT HARD AT ALL

## 2024-03-25 ASSESSMENT — PATIENT HEALTH QUESTIONNAIRE - PHQ9
SUM OF ALL RESPONSES TO PHQ QUESTIONS 1-9: 0
SUM OF ALL RESPONSES TO PHQ9 QUESTIONS 1 & 2: 0
SUM OF ALL RESPONSES TO PHQ QUESTIONS 1-9: 0
SUM OF ALL RESPONSES TO PHQ QUESTIONS 1-9: 0
1. LITTLE INTEREST OR PLEASURE IN DOING THINGS: NOT AT ALL
SUM OF ALL RESPONSES TO PHQ QUESTIONS 1-9: 0
1. LITTLE INTEREST OR PLEASURE IN DOING THINGS: NOT AT ALL
2. FEELING DOWN, DEPRESSED OR HOPELESS: NOT AT ALL
SUM OF ALL RESPONSES TO PHQ QUESTIONS 1-9: 0
SUM OF ALL RESPONSES TO PHQ QUESTIONS 1-9: 0
SUM OF ALL RESPONSES TO PHQ9 QUESTIONS 1 & 2: 0
6. FEELING BAD ABOUT YOURSELF - OR THAT YOU ARE A FAILURE OR HAVE LET YOURSELF OR YOUR FAMILY DOWN: NOT AT ALL
5. POOR APPETITE OR OVEREATING: NOT AT ALL
4. FEELING TIRED OR HAVING LITTLE ENERGY: NOT AT ALL
10. IF YOU CHECKED OFF ANY PROBLEMS, HOW DIFFICULT HAVE THESE PROBLEMS MADE IT FOR YOU TO DO YOUR WORK, TAKE CARE OF THINGS AT HOME, OR GET ALONG WITH OTHER PEOPLE: NOT DIFFICULT AT ALL
3. TROUBLE FALLING OR STAYING ASLEEP: NOT AT ALL
9. THOUGHTS THAT YOU WOULD BE BETTER OFF DEAD, OR OF HURTING YOURSELF: NOT AT ALL
7. TROUBLE CONCENTRATING ON THINGS, SUCH AS READING THE NEWSPAPER OR WATCHING TELEVISION: NOT AT ALL
8. MOVING OR SPEAKING SO SLOWLY THAT OTHER PEOPLE COULD HAVE NOTICED. OR THE OPPOSITE, BEING SO FIGETY OR RESTLESS THAT YOU HAVE BEEN MOVING AROUND A LOT MORE THAN USUAL: NOT AT ALL
2. FEELING DOWN, DEPRESSED OR HOPELESS: NOT AT ALL

## 2024-03-25 NOTE — PROGRESS NOTES
blowing her nose when working around plants.  She does not want to take an oral antihistamine.  We discussed Zaditor and Astepro instead.    She had a fall at the [employer] game due to somebody falling on top of her.  She hit her right cheek on a rail and had a black eye.  Since that time there is a divot in her right cheek.  She denies tenderness.  She has no trouble swallowing or smiling.  She was evaluated at the time and was negative on their concussion protocol.    Patient did see [facility] regarding her abnormal MRI.  She never heard back about the labs    She complains of earwax and wants to know if she needs to see Dr. Dewitt again about that    Review of Systems    Past Medical History:   Diagnosis Date    Abnormal EKG     saw cardio, wnl stress test 3/20    Allergic rhinitis     spring trees    Arthritis     Bursitis 3/2022    Left elbow    Depression     Depression     Diabetes mellitus type 2 in obese (HCC)     Endometriosis     Essential hypertension     Headache     History of thrombophlebitis     right leg    Hyperlipidemia     DEJA on CPAP     cpap    Osteoarthritis 2022    Plantar fasciitis     Recurrent sinus infections     Recurrent upper respiratory infection (URI)        Current Outpatient Medications   Medication Sig Dispense Refill    guaiFENesin (MUCINEX) 600 MG extended release tablet Take 1 tablet by mouth 2 times daily as needed for Congestion (thick mucous in throat from cpap) 60 tablet 0    omega-3 acid ethyl esters (LOVAZA) 1 g capsule Take 2 capsules by mouth 2 times daily 120 capsule 0    amLODIPine (NORVASC) 5 MG tablet Take 1.5 tablets by mouth daily 135 tablet 0    metFORMIN (GLUCOPHAGE-XR) 500 MG extended release tablet Take 1 tablet by mouth daily (with breakfast) 30 tablet 2    losartan (COZAAR) 100 MG tablet TAKE 1 TABLET BY MOUTH EVERY DAY 90 tablet 1    rosuvastatin (CRESTOR) 40 MG tablet TAKE 1 TABLET BY MOUTH EVERY DAY 90 tablet 1    atenolol (TENORMIN) 50 MG tablet TAKE 1 TABLET BY

## 2024-03-25 NOTE — PATIENT INSTRUCTIONS
Zaditor eye drops and Astepro nasal spray for allergies    Mucinex for thick mucous    Lovaza fish oil    Increase to amlodipine 7.5 mg daily  Compression stockings if feet swell  Call Dr Huerta about appt - recheck bp etc  Call Dr Dewitt about right cheek

## 2024-04-08 ENCOUNTER — TELEPHONE (OUTPATIENT)
Dept: INTERNAL MEDICINE CLINIC | Age: 69
End: 2024-04-08

## 2024-04-08 ENCOUNTER — E-VISIT (OUTPATIENT)
Dept: INTERNAL MEDICINE CLINIC | Age: 69
End: 2024-04-08
Payer: MEDICARE

## 2024-04-08 DIAGNOSIS — J01.81 OTHER ACUTE RECURRENT SINUSITIS: Primary | ICD-10-CM

## 2024-04-08 PROCEDURE — 99421 OL DIG E/M SVC 5-10 MIN: CPT | Performed by: INTERNAL MEDICINE

## 2024-04-08 RX ORDER — SULFAMETHOXAZOLE AND TRIMETHOPRIM 800; 160 MG/1; MG/1
1 TABLET ORAL 2 TIMES DAILY
Qty: 20 TABLET | Refills: 0 | Status: SHIPPED | OUTPATIENT
Start: 2024-04-08 | End: 2024-04-18

## 2024-04-08 ASSESSMENT — LIFESTYLE VARIABLES
SMOKING_YEARS: 14
PACKS_PER_DAY: 1
SMOKING_STATUS: NO, I'M A FORMER SMOKER

## 2024-04-08 NOTE — TELEPHONE ENCOUNTER
Patient is calling because as of Friday she has a severe  sinus infection.  Very bad congestion, slight cough producing yellow blood tinged phlegm.  No fever that she is aware of/temp normally runs 97- meir.    Has not tested for flu or COVID-states she knows it is a sinus infection/gets every year.   Not responding to any OTC items.  She is going to attempt an e-visit  and will  call  office to let us know if it kicks her out.

## 2024-04-10 DIAGNOSIS — E11.9 CONTROLLED TYPE 2 DIABETES MELLITUS WITHOUT COMPLICATION, WITHOUT LONG-TERM CURRENT USE OF INSULIN (HCC): Primary | ICD-10-CM

## 2024-04-12 ENCOUNTER — TELEPHONE (OUTPATIENT)
Dept: INTERNAL MEDICINE CLINIC | Age: 69
End: 2024-04-12

## 2024-04-12 NOTE — TELEPHONE ENCOUNTER
[facility] pharmacy is calling in for  in regards to medication request for :    omega-3 acid ethyl esters (LOVAZA) 1 g capsule     Per pharmacist the medication has soy and patient is allergic to nuts and is wondering if patient would be able to tolerate the medication or if a new prescription could be called in.     Please call [facility] pharmacy at :    [facility]/pharmacy #[id number] - [place], [place] - [address] - P [phone] - F [phone] [address] [place] [address]  Phone: [phone]  Fax: [phone]

## 2024-04-15 ASSESSMENT — SLEEP AND FATIGUE QUESTIONNAIRES
HOW LIKELY ARE YOU TO NOD OFF OR FALL ASLEEP WHEN YOU ARE A PASSENGER IN A CAR FOR AN HOUR WITHOUT A BREAK: WOULD NEVER DOZE
HOW LIKELY ARE YOU TO NOD OFF OR FALL ASLEEP WHILE SITTING INACTIVE IN A PUBLIC PLACE: WOULD NEVER DOZE
HOW LIKELY ARE YOU TO NOD OFF OR FALL ASLEEP WHILE LYING DOWN TO REST IN THE AFTERNOON WHEN CIRCUMSTANCES PERMIT: MODERATE CHANCE OF DOZING
HOW LIKELY ARE YOU TO NOD OFF OR FALL ASLEEP WHILE SITTING QUIETLY AFTER LUNCH WITHOUT ALCOHOL: WOULD NEVER DOZE
HOW LIKELY ARE YOU TO NOD OFF OR FALL ASLEEP WHILE SITTING AND READING: SLIGHT CHANCE OF DOZING
HOW LIKELY ARE YOU TO NOD OFF OR FALL ASLEEP WHEN YOU ARE A PASSENGER IN A CAR FOR AN HOUR WITHOUT A BREAK: WOULD NEVER DOZE
HOW LIKELY ARE YOU TO NOD OFF OR FALL ASLEEP WHILE WATCHING TV: MODERATE CHANCE OF DOZING
HOW LIKELY ARE YOU TO NOD OFF OR FALL ASLEEP WHILE SITTING QUIETLY AFTER LUNCH WITHOUT ALCOHOL: WOULD NEVER DOZE
HOW LIKELY ARE YOU TO NOD OFF OR FALL ASLEEP WHILE SITTING INACTIVE IN A PUBLIC PLACE: WOULD NEVER DOZE
HOW LIKELY ARE YOU TO NOD OFF OR FALL ASLEEP WHILE LYING DOWN TO REST IN THE AFTERNOON WHEN CIRCUMSTANCES PERMIT: MODERATE CHANCE OF DOZING
HOW LIKELY ARE YOU TO NOD OFF OR FALL ASLEEP IN A CAR, WHILE STOPPED FOR A FEW MINUTES IN TRAFFIC: WOULD NEVER DOZE
HOW LIKELY ARE YOU TO NOD OFF OR FALL ASLEEP WHILE SITTING AND TALKING TO SOMEONE: WOULD NEVER DOZE
HOW LIKELY ARE YOU TO NOD OFF OR FALL ASLEEP IN A CAR, WHILE STOPPED FOR A FEW MINUTES IN TRAFFIC: WOULD NEVER DOZE
ESS TOTAL SCORE: 5
HOW LIKELY ARE YOU TO NOD OFF OR FALL ASLEEP WHILE WATCHING TV: MODERATE CHANCE OF DOZING
HOW LIKELY ARE YOU TO NOD OFF OR FALL ASLEEP WHILE SITTING AND TALKING TO SOMEONE: WOULD NEVER DOZE
HOW LIKELY ARE YOU TO NOD OFF OR FALL ASLEEP WHILE SITTING AND READING: SLIGHT CHANCE OF DOZING

## 2024-04-16 DIAGNOSIS — E11.69 TYPE 2 DIABETES MELLITUS WITH OBESITY (HCC): ICD-10-CM

## 2024-04-16 DIAGNOSIS — E66.9 TYPE 2 DIABETES MELLITUS WITH OBESITY (HCC): ICD-10-CM

## 2024-04-16 RX ORDER — METFORMIN HYDROCHLORIDE 500 MG/1
500 TABLET, EXTENDED RELEASE ORAL
Qty: 90 TABLET | Refills: 1 | Status: SHIPPED | OUTPATIENT
Start: 2024-04-16

## 2024-04-16 NOTE — TELEPHONE ENCOUNTER
Tell pharmacy and patient she can try the Lovaza, if it's okay with the patient, since it's highly unlikely that her \"nut\" allergy will cause an allergic reaction to the limited soy components of Lovaza.  Keep Benadryl handy and let me know if develops scratchy swollen feeling throat or rash.

## 2024-04-16 NOTE — TELEPHONE ENCOUNTER
Please check with pt to confirm \"soy\" allergy since it's not on our allergy list and let me know.

## 2024-04-17 ENCOUNTER — OFFICE VISIT (OUTPATIENT)
Dept: PULMONOLOGY | Age: 69
End: 2024-04-17
Payer: MEDICARE

## 2024-04-17 VITALS
SYSTOLIC BLOOD PRESSURE: 122 MMHG | HEART RATE: 67 BPM | OXYGEN SATURATION: 97 % | DIASTOLIC BLOOD PRESSURE: 77 MMHG | WEIGHT: 203 LBS | BODY MASS INDEX: 29.98 KG/M2

## 2024-04-17 DIAGNOSIS — I10 ESSENTIAL HYPERTENSION: Chronic | ICD-10-CM

## 2024-04-17 DIAGNOSIS — G47.33 OBSTRUCTIVE SLEEP APNEA SYNDROME: Primary | Chronic | ICD-10-CM

## 2024-04-17 PROCEDURE — G8399 PT W/DXA RESULTS DOCUMENT: HCPCS | Performed by: NURSE PRACTITIONER

## 2024-04-17 PROCEDURE — 1036F TOBACCO NON-USER: CPT | Performed by: NURSE PRACTITIONER

## 2024-04-17 PROCEDURE — G8427 DOCREV CUR MEDS BY ELIG CLIN: HCPCS | Performed by: NURSE PRACTITIONER

## 2024-04-17 PROCEDURE — 1090F PRES/ABSN URINE INCON ASSESS: CPT | Performed by: NURSE PRACTITIONER

## 2024-04-17 PROCEDURE — 1123F ACP DISCUSS/DSCN MKR DOCD: CPT | Performed by: NURSE PRACTITIONER

## 2024-04-17 PROCEDURE — 3074F SYST BP LT 130 MM HG: CPT | Performed by: NURSE PRACTITIONER

## 2024-04-17 PROCEDURE — 3017F COLORECTAL CA SCREEN DOC REV: CPT | Performed by: NURSE PRACTITIONER

## 2024-04-17 PROCEDURE — 99214 OFFICE O/P EST MOD 30 MIN: CPT | Performed by: NURSE PRACTITIONER

## 2024-04-17 PROCEDURE — G2211 COMPLEX E/M VISIT ADD ON: HCPCS | Performed by: NURSE PRACTITIONER

## 2024-04-17 PROCEDURE — G8417 CALC BMI ABV UP PARAM F/U: HCPCS | Performed by: NURSE PRACTITIONER

## 2024-04-17 PROCEDURE — 3078F DIAST BP <80 MM HG: CPT | Performed by: NURSE PRACTITIONER

## 2024-04-17 NOTE — PROGRESS NOTES
[phone]    Sonam Valenzuela  1955  [address] [place] [id number] [phone] (home)   [phone] (mobile)      Insurance Info (confirm with patient if correct):  Payer/Plan Subscr  Sex Relation Sub. Ins. ID Effective Group Num

## 2024-04-17 NOTE — ASSESSMENT & PLAN NOTE
Chronic-Stable: Reviewed and analyzed results of physiologic download from patient's machine and reviewed with patient.  Supplies and parts as needed for her machine.  These are medically necessary.  Limit caffeine use after 3pm. Based on the analyzed data will continue with current settings. Stable on her machine at current settings, getting benefit from the use, and having minimal side effects. Spoke with Ryan from [facility] and he will contact patient to discuss supplies.  Discussed seeing her back sooner, she declines at this time would like to schedule follow-up for 1 year.  Will pull machine data report in 1 month.  Encouraged her to contact the office with any questions or concerns.

## 2024-04-17 NOTE — PROGRESS NOTES
Abhinav Bowers CNP  [facility]  [address]  [address]  [place] [address]  P- [phone]  F- [phone]     [facility] PHYSICIANS [place] [facility] SLEEP [facility]  [address]  [address]  [facility] [address]  Dept: [phone]  Dept Fax: [phone]  Loc: [phone]      Assessment/Plan:      1. Obstructive sleep apnea syndrome  Assessment & Plan:  Chronic-Stable: Reviewed and analyzed results of physiologic download from patient's machine and reviewed with patient.  Supplies and parts as needed for her machine.  These are medically necessary.  Limit caffeine use after 3pm. Based on the analyzed data will continue with current settings. Stable on her machine at current settings, getting benefit from the use, and having minimal side effects. Spoke with Ryan from [facility] and he will contact patient to discuss supplies.  Discussed seeing her back sooner, she declines at this time would like to schedule follow-up for 1 year.  Will pull machine data report in 1 month.  Encouraged her to contact the office with any questions or concerns.    2. Essential hypertension  Assessment & Plan:  Chronic- Stable.  Discussed the importance of treating obstructive sleep apnea as part of the management of this disorder.  Cont any meds per PCP and other physicians.        Reviewed, analyzed, and documented physiologic data from patient's PAP machine.    This information was analyzed to assess complexity and medical decision making in regards to further testing and management.    The primary encounter diagnosis was Obstructive sleep apnea syndrome. A diagnosis of Essential hypertension was also pertinent to this visit.  The chronic medical conditions listed are directly related to the primary diagnosis listed above.  The management of the primary diagnosis affects the secondary diagnosis and vice versa.          Subjective   Patient ID: Sonam [id number]

## 2024-04-18 LAB
BASOPHILS ABSOLUTE: 0.03 /ΜL
BASOPHILS RELATIVE PERCENT: 0.7 %
EOSINOPHILS ABSOLUTE: 0.11 /ΜL
EOSINOPHILS RELATIVE PERCENT: 2.4 %
HCT VFR BLD CALC: 38 % (ref 36–46)
HEMOGLOBIN: 12.8 G/DL (ref 12–16)
LYMPHOCYTES ABSOLUTE: 1.62 /ΜL
LYMPHOCYTES RELATIVE PERCENT: 35.8 %
MCH RBC QN AUTO: 30.5 PG
MCHC RBC AUTO-ENTMCNC: 33.7 G/DL
MCV RBC AUTO: 90.5 FL
MONOCYTES ABSOLUTE: 0.48 /ΜL
MONOCYTES RELATIVE PERCENT: 10.6 %
NEUTROPHILS ABSOLUTE: 2.28 /ΜL
NEUTROPHILS RELATIVE PERCENT: 50.5 %
PLATELET # BLD: 206 K/ΜL
PMV BLD AUTO: NORMAL FL
RBC # BLD: 4.2 10^6/ΜL
WBC # BLD: 4.5 10^3/ML

## 2024-04-19 LAB
ALBUMIN SERPL-MCNC: 4.3 G/DL
ALP BLD-CCNC: 86 U/L
ALT SERPL-CCNC: 29 U/L
ANION GAP SERPL CALCULATED.3IONS-SCNC: 10.1 MMOL/L
AST SERPL-CCNC: 33 U/L
BILIRUB SERPL-MCNC: 0.6 MG/DL (ref 0.1–1.4)
BUN BLDV-MCNC: 22 MG/DL
CALCIUM SERPL-MCNC: 10.2 MG/DL
CHLORIDE BLD-SCNC: 107 MMOL/L
CO2: 22.9 MMOL/L
CREAT SERPL-MCNC: 0.66 MG/DL
EGFR: >60
GLUCOSE BLD-MCNC: 122 MG/DL
POTASSIUM SERPL-SCNC: 4.5 MMOL/L
SODIUM BLD-SCNC: 140 MMOL/L
TOTAL PROTEIN: 7

## 2024-04-24 ENCOUNTER — TELEPHONE (OUTPATIENT)
Dept: PULMONOLOGY | Age: 69
End: 2024-04-24

## 2024-04-24 NOTE — TELEPHONE ENCOUNTER
Patient called to say [facility] is back ordered on her supplies and asked if we could send it elsewhere.  I suggested [facility] and sent the order asking them to expedite.  WIll also call [facility] to make them aware.

## 2024-05-15 ENCOUNTER — TELEPHONE (OUTPATIENT)
Dept: PULMONOLOGY | Age: 69
End: 2024-05-15

## 2024-05-16 NOTE — TELEPHONE ENCOUNTER
Attempted to review machine data however no access to her machine modem. [employer] was to link her [employer] Machine modem. Please call [employer] to link us to her machine modem.

## 2024-05-17 NOTE — TELEPHONE ENCOUNTER
Called and left a message for [employer] ([employer]) to connect us to patient's modem and to call us when it has been done.

## 2024-05-28 DIAGNOSIS — E78.5 HYPERLIPIDEMIA, UNSPECIFIED HYPERLIPIDEMIA TYPE: ICD-10-CM

## 2024-05-29 RX ORDER — OMEGA-3-ACID ETHYL ESTERS 1 G/1
2 CAPSULE, LIQUID FILLED ORAL 2 TIMES DAILY
Qty: 120 CAPSULE | Refills: 0 | Status: SHIPPED | OUTPATIENT
Start: 2024-05-29

## 2024-05-29 NOTE — TELEPHONE ENCOUNTER
Last appointment: 4/8/2024  Next appointment: 6/24/2024  Last refill: 03/25/2024 04/17/2024 Reported patient not taking. Called patient and left a voicemail for them to call back and see if they are still taking it or not on 05/29/2024 at 10:43am

## 2024-06-26 RX ORDER — VENLAFAXINE HYDROCHLORIDE 150 MG/1
CAPSULE, EXTENDED RELEASE ORAL DAILY
Qty: 90 CAPSULE | Refills: 1 | Status: SHIPPED | OUTPATIENT
Start: 2024-06-26

## 2024-06-26 NOTE — TELEPHONE ENCOUNTER
Medication:   Requested Prescriptions     Pending Prescriptions Disp Refills    venlafaxine (EFFEXOR XR) 150 MG extended release capsule [Pharmacy Med Name: VENLAFAXINE HCL  MG CAP] 90 capsule 1     Sig: TAKE 1 CAPSULE BY MOUTH EVERY DAY        Last Filled:  11/17/23    Patient Phone Number: [phone] (home)     Last appt: 4/8/2024   Return in about 3 months (around 6/25/2024).  Reveal Data message sent for patient to schedule her 3 month follow up.   Next appt: Visit date not found    Last OARRS:        No data to display

## 2024-07-02 ENCOUNTER — OFFICE VISIT (OUTPATIENT)
Dept: INTERNAL MEDICINE CLINIC | Age: 69
End: 2024-07-02

## 2024-07-02 ENCOUNTER — TELEPHONE (OUTPATIENT)
Dept: INTERNAL MEDICINE CLINIC | Age: 69
End: 2024-07-02

## 2024-07-02 VITALS
BODY MASS INDEX: 30.48 KG/M2 | DIASTOLIC BLOOD PRESSURE: 70 MMHG | SYSTOLIC BLOOD PRESSURE: 125 MMHG | WEIGHT: 206.4 LBS | HEART RATE: 63 BPM | OXYGEN SATURATION: 97 %

## 2024-07-02 DIAGNOSIS — E66.9 TYPE 2 DIABETES MELLITUS WITH OBESITY (HCC): Primary | ICD-10-CM

## 2024-07-02 DIAGNOSIS — E11.69 TYPE 2 DIABETES MELLITUS WITH OBESITY (HCC): ICD-10-CM

## 2024-07-02 DIAGNOSIS — Z12.31 ENCOUNTER FOR SCREENING MAMMOGRAM FOR MALIGNANT NEOPLASM OF BREAST: ICD-10-CM

## 2024-07-02 DIAGNOSIS — I10 ESSENTIAL HYPERTENSION: ICD-10-CM

## 2024-07-02 DIAGNOSIS — E11.69 TYPE 2 DIABETES MELLITUS WITH OBESITY (HCC): Primary | ICD-10-CM

## 2024-07-02 DIAGNOSIS — E78.5 HYPERLIPIDEMIA, UNSPECIFIED HYPERLIPIDEMIA TYPE: ICD-10-CM

## 2024-07-02 DIAGNOSIS — E66.9 TYPE 2 DIABETES MELLITUS WITH OBESITY (HCC): ICD-10-CM

## 2024-07-02 DIAGNOSIS — F32.A DEPRESSION, UNSPECIFIED DEPRESSION TYPE: ICD-10-CM

## 2024-07-02 RX ORDER — LANCETS 30 GAUGE
1 EACH MISCELLANEOUS DAILY
Qty: 100 EACH | Refills: 5 | Status: SHIPPED | OUTPATIENT
Start: 2024-07-02

## 2024-07-02 RX ORDER — BLOOD-GLUCOSE METER
1 KIT MISCELLANEOUS DAILY
Qty: 1 KIT | Refills: 0 | Status: SHIPPED | OUTPATIENT
Start: 2024-07-02

## 2024-07-02 RX ORDER — GLUCOSAMINE HCL/CHONDROITIN SU 500-400 MG
CAPSULE ORAL
Qty: 100 STRIP | Refills: 0 | Status: SHIPPED | OUTPATIENT
Start: 2024-07-02

## 2024-07-02 RX ORDER — OMEGA-3-ACID ETHYL ESTERS 1 G/1
2 CAPSULE, LIQUID FILLED ORAL 2 TIMES DAILY
Qty: 120 CAPSULE | Refills: 2 | Status: SHIPPED | OUTPATIENT
Start: 2024-07-02 | End: 2024-09-30

## 2024-07-02 NOTE — PROGRESS NOTES
Sonam Valenzuela (:  1955) is a 68 y.o. female, here for evaluation of the following chief complaint(s):    3 Month Follow-Up      ASSESSMENT/PLAN:  1. Type 2 diabetes mellitus with obesity (HCC)  Check labs on metformin.  -     Microalbumin / Creatinine Urine Ratio  -     Diabetic Foot Exam  -     Lipid Panel; Future  -     Comprehensive Metabolic Panel; Future  -     Hemoglobin A1C; Future  2. Encounter for screening mammogram for malignant neoplasm of breast  -     [facility] MELANIE DIGITAL SCREEN BILATERAL; Future  3. Essential hypertension  Well-controlled on amlodipine, atenolol, losartan  -     Uric Acid; Future  4. Hyperlipidemia, unspecified hyperlipidemia type  Stable on Lovaza and statin.  Check lipids.    5. Depression, unspecified depression type  Well-controlled on Effexor which we will continue.     Abnormal MRI  -    prior referral to hematology-Dr Lopez after abnormal MRI and per patient no clinically significant findings.  Return in about 6 months (around 2025).    SUBJECTIVE/OBJECTIVE:  HPI  Patient is here to follow-up.  She states her podiatrist started her on something for neuropathy which has been effective--methyl folate.  With her feet hurting less, she hopes to increase her cardio.    She has tried to change her eating by cutting out carbs.  She states metformin initially made her constipated but she increased her fiber and this has helped.    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    Review of Systems    Past Medical History:   Diagnosis Date    Abnormal EKG     saw cardio, wnl stress test 3/20    Allergic rhinitis     spring trees    Arthritis     Bursitis 3/2022    Left elbow    Depression     Depression     Diabetes mellitus type 2 in obese     Endometriosis     Essential hypertension     Headache     History of thrombophlebitis     right leg    Hyperlipidemia     DEJA on CPAP     cpap    Osteoarthritis     Plantar fasciitis     Recurrent sinus infections     Recurrent upper

## 2024-07-02 NOTE — TELEPHONE ENCOUNTER
----- Message from Carmelita Redmond MD sent at 7/2/2024  9:51 AM EDT -----  [place] at [facility] get [facility]  
Called [facility] [place] for last eye exam, per office last eye exam with 's was 2020.   Faxed request for mutual patient to F# [phone].  
Detail Level: Detailed

## 2024-07-03 LAB
ALBUMIN SERPL-MCNC: 4.8 G/DL (ref 3.4–5)
ALBUMIN/GLOB SERPL: 2 {RATIO} (ref 1.1–2.2)
ALP SERPL-CCNC: 79 U/L (ref 40–129)
ALT SERPL-CCNC: 28 U/L (ref 10–40)
ANION GAP SERPL CALCULATED.3IONS-SCNC: 11 MMOL/L (ref 3–16)
AST SERPL-CCNC: 28 U/L (ref 15–37)
BILIRUB SERPL-MCNC: 0.4 MG/DL (ref 0–1)
BUN SERPL-MCNC: 21 MG/DL (ref 7–20)
CALCIUM SERPL-MCNC: 9.7 MG/DL (ref 8.3–10.6)
CHLORIDE SERPL-SCNC: 104 MMOL/L (ref 99–110)
CHOLEST SERPL-MCNC: 190 MG/DL (ref 0–199)
CO2 SERPL-SCNC: 25 MMOL/L (ref 21–32)
CREAT SERPL-MCNC: 0.6 MG/DL (ref 0.6–1.2)
EST. AVERAGE GLUCOSE BLD GHB EST-MCNC: 119.8 MG/DL
GFR SERPLBLD CREATININE-BSD FMLA CKD-EPI: >90 ML/MIN/{1.73_M2}
GLUCOSE SERPL-MCNC: 142 MG/DL (ref 70–99)
HBA1C MFR BLD: 5.8 %
HDLC SERPL-MCNC: 69 MG/DL (ref 40–60)
LDLC SERPL CALC-MCNC: 85 MG/DL
POTASSIUM SERPL-SCNC: 4.7 MMOL/L (ref 3.5–5.1)
PROT SERPL-MCNC: 7.2 G/DL (ref 6.4–8.2)
SODIUM SERPL-SCNC: 140 MMOL/L (ref 136–145)
TRIGL SERPL-MCNC: 181 MG/DL (ref 0–150)
URATE SERPL-MCNC: 4.8 MG/DL (ref 2.6–6)
VLDLC SERPL CALC-MCNC: 36 MG/DL

## 2024-07-11 ENCOUNTER — HOSPITAL ENCOUNTER (OUTPATIENT)
Dept: MAMMOGRAPHY | Age: 69
Discharge: HOME | End: 2024-07-11
Payer: MEDICARE

## 2024-07-11 DIAGNOSIS — Z12.31 ENCOUNTER FOR SCREENING MAMMOGRAM FOR MALIGNANT NEOPLASM OF BREAST: ICD-10-CM

## 2024-07-11 PROCEDURE — 77063 BREAST TOMOSYNTHESIS BI: CPT

## 2024-08-06 DIAGNOSIS — I10 ESSENTIAL HYPERTENSION: ICD-10-CM

## 2024-08-06 RX ORDER — AMLODIPINE BESYLATE 5 MG/1
TABLET ORAL
Qty: 135 TABLET | Refills: 0 | Status: SHIPPED | OUTPATIENT
Start: 2024-08-06

## 2024-08-20 DIAGNOSIS — E78.5 HYPERLIPIDEMIA, UNSPECIFIED HYPERLIPIDEMIA TYPE: ICD-10-CM

## 2024-08-20 DIAGNOSIS — I10 ESSENTIAL HYPERTENSION: Primary | ICD-10-CM

## 2024-08-20 RX ORDER — ROSUVASTATIN CALCIUM 40 MG/1
TABLET, COATED ORAL
Qty: 90 TABLET | Refills: 1 | Status: SHIPPED | OUTPATIENT
Start: 2024-08-20

## 2024-08-20 RX ORDER — ATENOLOL 50 MG/1
TABLET ORAL
Qty: 90 TABLET | Refills: 1 | Status: SHIPPED | OUTPATIENT
Start: 2024-08-20

## 2024-09-07 ENCOUNTER — HOSPITAL ENCOUNTER (EMERGENCY)
Age: 69
Discharge: HOME | End: 2024-09-07
Attending: STUDENT IN AN ORGANIZED HEALTH CARE EDUCATION/TRAINING PROGRAM
Payer: MEDICARE

## 2024-09-07 ENCOUNTER — APPOINTMENT (OUTPATIENT)
Dept: GENERAL RADIOLOGY | Age: 69
End: 2024-09-07
Payer: MEDICARE

## 2024-09-07 ENCOUNTER — APPOINTMENT (OUTPATIENT)
Dept: CT IMAGING | Age: 69
End: 2024-09-07
Payer: MEDICARE

## 2024-09-07 VITALS
SYSTOLIC BLOOD PRESSURE: 172 MMHG | DIASTOLIC BLOOD PRESSURE: 96 MMHG | RESPIRATION RATE: 16 BRPM | OXYGEN SATURATION: 100 % | BODY MASS INDEX: 30.26 KG/M2 | HEIGHT: 69 IN | WEIGHT: 204.3 LBS | HEART RATE: 65 BPM | TEMPERATURE: 98.4 F

## 2024-09-07 DIAGNOSIS — S09.90XA MINOR HEAD INJURY, INITIAL ENCOUNTER: ICD-10-CM

## 2024-09-07 DIAGNOSIS — M25.511 ACUTE PAIN OF RIGHT SHOULDER: ICD-10-CM
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
## 2024-09-07 PROCEDURE — 73060 X-RAY EXAM OF HUMERUS: CPT

## 2024-09-07 PROCEDURE — 99284 EMERGENCY DEPT VISIT MOD MDM: CPT

## 2024-09-07 PROCEDURE — 70450 CT HEAD/BRAIN W/O DYE: CPT

## 2024-09-07 PROCEDURE — 71045 X-RAY EXAM CHEST 1 VIEW: CPT

## 2024-09-07 PROCEDURE — 73030 X-RAY EXAM OF SHOULDER: CPT

## 2024-09-07 ASSESSMENT — ENCOUNTER SYMPTOMS
VOMITING: 0
DIARRHEA: 0
NAUSEA: 0
PHOTOPHOBIA: 0
WHEEZING: 0
RESPIRATORY NEGATIVE: 1
EYES NEGATIVE: 1
EYE REDNESS: 0
GASTROINTESTINAL NEGATIVE: 1
SHORTNESS OF BREATH: 0
FACIAL SWELLING: 0
EYE PAIN: 0
BACK PAIN: 0
ABDOMINAL PAIN: 0
SORE THROAT: 0

## 2024-09-07 ASSESSMENT — PAIN DESCRIPTION - LOCATION: LOCATION: SHOULDER

## 2024-09-07 ASSESSMENT — PAIN DESCRIPTION - ORIENTATION: ORIENTATION: RIGHT

## 2024-09-07 ASSESSMENT — PAIN SCALES - GENERAL: PAINLEVEL_OUTOF10: 9

## 2024-09-07 ASSESSMENT — PAIN - FUNCTIONAL ASSESSMENT: PAIN_FUNCTIONAL_ASSESSMENT: 0-10

## 2024-09-07 ASSESSMENT — PAIN DESCRIPTION - DESCRIPTORS: DESCRIPTORS: THROBBING

## 2024-09-07 NOTE — ED PROVIDER NOTES
ED Attending Attestation Note     Date of evaluation: 9/7/2024    This patient was seen by the advance practice provider.  I have seen and examined the patient, agree with the workup, evaluation, management and diagnosis. The care plan has been discussed.   My assessment reveals a well appearing female presenting after a fall. Patient tripped over her cat and sustained a mechanical fall. She has mild R periorbital ecchymosis. CT head negative. R shoulder with decreased active ROM, mostly with abduction. Suspect rotator cuff injury. Will treat with conservative measures and refer to sports medicine.     Jalil Carney MD  09/07/24 2054

## 2024-09-07 NOTE — DISCHARGE INSTRUCTIONS
Rest, ice packs 15 minutes 2-3 times daily.  Wear sling for comfort.  Try to do range of motion exercises with your right shoulder and take out of sling 2-3 times daily if tolerable.  Take Tylenol and ibuprofen or Aleve for mild to moderate pain and take oxycodone for moderate to severe pain.    If increased pain, swelling, numbness, weakness or worsening symptoms return the emergency department.  If headaches, dizziness, vision changes, vomiting or worse return the emergency department.

## 2024-09-09 ENCOUNTER — OFFICE VISIT (OUTPATIENT)
Dept: ORTHOPEDIC SURGERY | Age: 69
End: 2024-09-09
Payer: MEDICARE

## 2024-09-09 VITALS — BODY MASS INDEX: 30.21 KG/M2 | WEIGHT: 204 LBS | HEIGHT: 69 IN

## 2024-09-09 DIAGNOSIS — Z96.611 S/P REVERSE TOTAL SHOULDER ARTHROPLASTY, RIGHT: Primary | ICD-10-CM

## 2024-09-09 DIAGNOSIS — M25.511 ACUTE PAIN OF RIGHT SHOULDER: ICD-10-CM
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
## 2024-09-09 PROCEDURE — G8417 CALC BMI ABV UP PARAM F/U: HCPCS | Performed by: PHYSICIAN ASSISTANT

## 2024-09-09 PROCEDURE — 3017F COLORECTAL CA SCREEN DOC REV: CPT | Performed by: PHYSICIAN ASSISTANT

## 2024-09-09 PROCEDURE — 1036F TOBACCO NON-USER: CPT | Performed by: PHYSICIAN ASSISTANT

## 2024-09-09 PROCEDURE — 1090F PRES/ABSN URINE INCON ASSESS: CPT | Performed by: PHYSICIAN ASSISTANT

## 2024-09-09 PROCEDURE — 99214 OFFICE O/P EST MOD 30 MIN: CPT | Performed by: PHYSICIAN ASSISTANT

## 2024-09-09 PROCEDURE — G8427 DOCREV CUR MEDS BY ELIG CLIN: HCPCS | Performed by: PHYSICIAN ASSISTANT

## 2024-09-09 PROCEDURE — 1123F ACP DISCUSS/DSCN MKR DOCD: CPT | Performed by: PHYSICIAN ASSISTANT

## 2024-09-09 PROCEDURE — G8399 PT W/DXA RESULTS DOCUMENT: HCPCS | Performed by: PHYSICIAN ASSISTANT

## 2024-09-11 DIAGNOSIS — I10 ESSENTIAL HYPERTENSION: Primary | ICD-10-CM

## 2024-09-11 DIAGNOSIS — I10 ESSENTIAL HYPERTENSION: ICD-10-CM

## 2024-09-11 RX ORDER — LOSARTAN POTASSIUM 100 MG/1
TABLET ORAL
Qty: 90 TABLET | Refills: 1 | Status: SHIPPED | OUTPATIENT
Start: 2024-09-11

## 2024-09-11 RX ORDER — METFORMIN HCL 500 MG
500 TABLET, EXTENDED RELEASE 24 HR ORAL
Qty: 90 TABLET | Refills: 1 | Status: SHIPPED | OUTPATIENT
Start: 2024-09-11

## 2024-09-11 RX ORDER — AMLODIPINE BESYLATE 5 MG/1
5 TABLET ORAL DAILY
Qty: 90 TABLET | Refills: 3 | Status: SHIPPED | OUTPATIENT
Start: 2024-09-11

## 2024-09-12 ENCOUNTER — OFFICE VISIT (OUTPATIENT)
Dept: CARDIOLOGY CLINIC | Age: 69
End: 2024-09-12

## 2024-09-12 VITALS
BODY MASS INDEX: 29.71 KG/M2 | DIASTOLIC BLOOD PRESSURE: 80 MMHG | HEART RATE: 61 BPM | OXYGEN SATURATION: 95 % | SYSTOLIC BLOOD PRESSURE: 130 MMHG | WEIGHT: 201.2 LBS

## 2024-09-12 DIAGNOSIS — I10 ESSENTIAL HYPERTENSION: ICD-10-CM

## 2024-09-12 DIAGNOSIS — E78.5 HYPERLIPIDEMIA, UNSPECIFIED HYPERLIPIDEMIA TYPE: Primary | ICD-10-CM

## 2024-09-24 ENCOUNTER — OFFICE VISIT (OUTPATIENT)
Dept: ORTHOPEDIC SURGERY | Age: 69
End: 2024-09-24
Payer: MEDICARE

## 2024-09-24 VITALS — BODY MASS INDEX: 29.77 KG/M2 | HEIGHT: 69 IN | WEIGHT: 201 LBS

## 2024-09-24 DIAGNOSIS — M84.312A: Primary | ICD-10-CM

## 2024-09-24 PROCEDURE — G8417 CALC BMI ABV UP PARAM F/U: HCPCS | Performed by: ORTHOPAEDIC SURGERY

## 2024-09-24 PROCEDURE — 99213 OFFICE O/P EST LOW 20 MIN: CPT | Performed by: ORTHOPAEDIC SURGERY

## 2024-09-24 PROCEDURE — G8399 PT W/DXA RESULTS DOCUMENT: HCPCS | Performed by: ORTHOPAEDIC SURGERY

## 2024-09-24 PROCEDURE — 3017F COLORECTAL CA SCREEN DOC REV: CPT | Performed by: ORTHOPAEDIC SURGERY

## 2024-09-24 PROCEDURE — G8427 DOCREV CUR MEDS BY ELIG CLIN: HCPCS | Performed by: ORTHOPAEDIC SURGERY

## 2024-09-24 PROCEDURE — 1036F TOBACCO NON-USER: CPT | Performed by: ORTHOPAEDIC SURGERY

## 2024-09-24 PROCEDURE — 1090F PRES/ABSN URINE INCON ASSESS: CPT | Performed by: ORTHOPAEDIC SURGERY

## 2024-09-24 PROCEDURE — 1123F ACP DISCUSS/DSCN MKR DOCD: CPT | Performed by: ORTHOPAEDIC SURGERY

## 2024-09-30 ENCOUNTER — HOSPITAL ENCOUNTER (OUTPATIENT)
Dept: PHYSICAL THERAPY | Age: 69
Setting detail: THERAPIES SERIES
Discharge: HOME | End: 2024-09-30
Payer: MEDICARE

## 2024-09-30 PROCEDURE — 97110 THERAPEUTIC EXERCISES: CPT | Performed by: PHYSICAL THERAPIST

## 2024-09-30 PROCEDURE — 97140 MANUAL THERAPY 1/> REGIONS: CPT | Performed by: PHYSICAL THERAPIST

## 2024-09-30 PROCEDURE — 97161 PT EVAL LOW COMPLEX 20 MIN: CPT | Performed by: PHYSICAL THERAPIST

## 2024-10-01 NOTE — PLAN OF CARE
[facility]- Outpatient Rehabilitation and Therapy [address], [address], [place] [address] office: [phone] fax: [phone]     Physical Therapy Initial Evaluation Certification      Dear Dr Armas,     We had the pleasure of evaluating the following patient for physical therapy services at [facility].  A summary of our findings can be found in the initial assessment below.  This includes our plan of care.  If you have any questions or concerns regarding these findings, please do not hesitate to contact me at the office phone number listed above.  Thank you for the referral.     Physician Signature:_______________________________Date:__________________  By signing above (or electronic signature), therapist’s plan is approved by physician       Physical Therapy: TREATMENT/PROGRESS NOTE   Patient: Sonam Valenzuela (69 y.o. female)   Examination Date: 2024   :  1955 MRN: [id number]   Visit #: 1   Insurance Allowable Auth Needed    []Yes    []No    Insurance: Payor: MEDICARE / Plan: MEDICARE PART A AND B / Product Type: *No Product type* /   Insurance ID: [id number] - (Medicare)  Secondary Insurance (if applicable): MEDICAL M[employer]UAL   Treatment Diagnosis: right shoulder pain  - m25.511  No diagnosis found.   Medical Diagnosis:  Right arm pain [M79.601]   Referring Physician: Unknown, Provider  PCP: Carmelita Redmond MD     Plan of care signed (Y/N): N    Date of Patient follow up with Physician:      Plan of Care Report: EVAL today  POC update due: (10 visits /OR AUTH LIMITS, whichever is less)  10/31/2024                                             Medical History:  Comorbidities:  Osteoarthritis  Relevant Medical History:                                          Precautions/ Contra-indications:           Latex allergy:  NO  Pacemaker:    NO  Contraindications for Manipulation: None  Date of Surgery: in   Other:    Red

## 2024-10-07 ENCOUNTER — HOSPITAL ENCOUNTER (OUTPATIENT)
Dept: PHYSICAL THERAPY | Age: 69
Setting detail: THERAPIES SERIES
Discharge: HOME | End: 2024-10-07
Payer: MEDICARE

## 2024-10-07 PROCEDURE — 97140 MANUAL THERAPY 1/> REGIONS: CPT | Performed by: SPECIALIST/TECHNOLOGIST

## 2024-10-07 PROCEDURE — 97110 THERAPEUTIC EXERCISES: CPT | Performed by: SPECIALIST/TECHNOLOGIST

## 2024-10-07 NOTE — FLOWSHEET NOTE
[facility]- Outpatient Rehabilitation and Therapy [address], [place] [address] office: [phone] fax: [phone]           Physical Therapy: TREATMENT/PROGRESS NOTE   Patient: Sonam Valenzuela (69 y.o. female)   Examination Date: 10/07/2024   :  1955 MRN: [id number]   Visit #: 2   Insurance Allowable Auth Needed    []Yes    []No    Insurance: Payor: MEDICARE / Plan: MEDICARE PART A AND B / Product Type: *No Product type* /   Insurance ID: [id number] - (Medicare)  Secondary Insurance (if applicable): MEDICAL MUTUAL   Treatment Diagnosis: right shoulder pain  - m25.511  No diagnosis found.   Medical Diagnosis:  Right arm pain [M79.601]   Referring Physician: Dominick Armas MD  PCP: Carmelita Redmond MD     Plan of care signed (Y/N): N    Date of Patient follow up with Physician:      Plan of Care Report: NO  POC update due: (10 visits /OR AUTH LIMITS, whichever is less)  2024                                             Medical History:  Comorbidities:  Osteoarthritis  Relevant Medical History:                                          Precautions/ Contra-indications:           Latex allergy:  NO  Pacemaker:    NO  Contraindications for Manipulation: None  Date of Surgery: in   Other:    Red Flags:  None    Suicide Screening:   The patient did not verbalize a primary behavioral concern, suicidal ideation, suicidal intent, or demonstrate suicidal behaviors.    Preferred Language for Healthcare:   [x] English       [] other:    SUBJECTIVE EXAMINATION     Patient stated complaint: Pt. Reports her overall is feeling good.  Pt. Reports she is starting to use her right arm more throughout the day.         reports that she fell and landed on her hip, back, and shoulder blade a few weeks ago. Xray and CT scan were negative.  Took a dose pack and is feeling better.         Test used Initial score  10/1/24 10/07/2024   Pain Summary VAS  2/10   Functional

## 2024-10-14 ENCOUNTER — APPOINTMENT (OUTPATIENT)
Dept: PHYSICAL THERAPY | Age: 69
End: 2024-10-14
Payer: MEDICARE

## 2024-10-21 ENCOUNTER — OFFICE VISIT (OUTPATIENT)
Dept: ORTHOPEDIC SURGERY | Age: 69
End: 2024-10-21
Payer: MEDICARE

## 2024-10-21 ENCOUNTER — HOSPITAL ENCOUNTER (OUTPATIENT)
Dept: PHYSICAL THERAPY | Age: 69
Setting detail: THERAPIES SERIES
Discharge: HOME | End: 2024-10-21
Payer: MEDICARE

## 2024-10-21 VITALS — HEIGHT: 69 IN | BODY MASS INDEX: 29.77 KG/M2 | WEIGHT: 201 LBS

## 2024-10-21 DIAGNOSIS — Z96.611 S/P REVERSE TOTAL SHOULDER ARTHROPLASTY, RIGHT: ICD-10-CM

## 2024-10-21 DIAGNOSIS — M84.312A: Primary | ICD-10-CM

## 2024-10-21 PROCEDURE — G8399 PT W/DXA RESULTS DOCUMENT: HCPCS | Performed by: PHYSICIAN ASSISTANT

## 2024-10-21 PROCEDURE — 97140 MANUAL THERAPY 1/> REGIONS: CPT | Performed by: PHYSICAL THERAPIST

## 2024-10-21 PROCEDURE — G8427 DOCREV CUR MEDS BY ELIG CLIN: HCPCS | Performed by: PHYSICIAN ASSISTANT

## 2024-10-21 PROCEDURE — G8484 FLU IMMUNIZE NO ADMIN: HCPCS | Performed by: PHYSICIAN ASSISTANT

## 2024-10-21 PROCEDURE — 97110 THERAPEUTIC EXERCISES: CPT | Performed by: PHYSICAL THERAPIST

## 2024-10-21 PROCEDURE — 1123F ACP DISCUSS/DSCN MKR DOCD: CPT | Performed by: PHYSICIAN ASSISTANT

## 2024-10-21 PROCEDURE — 1090F PRES/ABSN URINE INCON ASSESS: CPT | Performed by: PHYSICIAN ASSISTANT

## 2024-10-21 PROCEDURE — G8417 CALC BMI ABV UP PARAM F/U: HCPCS | Performed by: PHYSICIAN ASSISTANT

## 2024-10-21 PROCEDURE — 1036F TOBACCO NON-USER: CPT | Performed by: PHYSICIAN ASSISTANT

## 2024-10-21 PROCEDURE — 99213 OFFICE O/P EST LOW 20 MIN: CPT | Performed by: PHYSICIAN ASSISTANT

## 2024-10-21 PROCEDURE — 3017F COLORECTAL CA SCREEN DOC REV: CPT | Performed by: PHYSICIAN ASSISTANT

## 2024-10-21 NOTE — PROGRESS NOTES
[facility]  History and Physical  Shoulder Pain    Date:  10/21/2024    Name:  Sonam Valenzuela  Address:  [address] [place] [id number]    :  1955      Age:   69 y.o.    SSN:  [id number]      Medical Record Number:  [id number]    Reason for Visit:    Shoulder Pain (RIGHT SHOULDER)      HPI:   Sonam Valenzuela is a 69 y.o. female who presents to our office today for follow up of the left shoulder pain.  Patient has a pertinent history of undergoing a right reverse total shoulder arthroplasty with latissimus dorsi transfer performed on 2023.  She sustained a blow to the right shoulder after a mechanical fall on .  A CT did rule out any scapular fractures.  She has been going to physical therapy to regain her full strength and mobility.  Overall patient reports she is doing quite well and has no pain or discomfort at this time.  She reports she has 1 more visit scheduled with Jermaine at the [place] office.  She denies any new injury since her last visit with us.      Pain Assessment  Location of Pain: Shoulder  Location Modifiers: Right  Severity of Pain: 1  Quality of Pain: Aching  Frequency of Pain: Intermittent  Aggravating Factors: Other (Comment), Exercise, Straightening, Stretching  Limiting Behavior: Some  Relieving Factors: Rest, Other (Comment), Exercise, Ice  Work-Related Injury: No  Are there other pain locations you wish to document?: No    No notes on file    Review of Systems:  A 14 point review of systems available in the scanned medical record as documented by the patient and reviewed on 10/21/2024.  The review is negative with the exception of those things mentioned in the History of Present Illness and Past Medical History.      Past Medical History:  Patient's medications, allergies, past medical, surgical, social and family histories were reviewed and updated as appropriate.    Allergies:  Allergies   Allergen Reactions

## 2024-10-22 NOTE — FLOWSHEET NOTE
or surgery.  (22249) MANUAL THERAPY -  Manual therapy techniques, 1 or more regions, each 15 minutes (Mobilization/manipulation, manual lymphatic drainage, manual traction) for the purpose of modulating pain, promoting relaxation,  increasing ROM, reducing/eliminating soft tissue swelling/inflammation/restriction, improving soft tissue extensibility and allowing for proper ROM for normal function with self care, mobility, lifting and ambulation    GOALS     Patient stated goal: \"to get back to being pain free\"  [x] Progressing: [] Met: [] Not Met: [] Adjusted    Therapist goals for Patient:   Short Term Goals: To be achieved in: 2 weeks  1. Independent in HEP and progression per patient tolerance, in order to prevent re-injury.   [x] Progressing: [] Met: [] Not Met: [] Adjusted  2. Patient will have a decrease in pain to <5/10 to facilitate improvement in movement, function, and ADLs as indicated by Functional Deficits.  [x] Progressing: [] Met: [] Not Met: [] Adjusted    Long Term Goals: To be achieved in: 12 weeks  1. Disability index score of 12% or less for the Upper Extremity functional Scale to assist with reaching prior level of function with activities such as overhead adls.  [x] Progressing: [] Met: [] Not Met: [] Adjusted  2. Patient will demonstrate increased AROM of shoulder to 160 f, 150 ab, 60 er @ 90 without pain to allow for proper joint functioning to enable patient to drive safely.   [x] Progressing: [] Met: [] Not Met: [] Adjusted  3. Patient will demonstrate increased Strength of right shoulder to 4/5 to allow for proper functional mobility to enable patient to return to .   [x] Progressing: [] Met: [] Not Met: [] Adjusted  4. Patient will return to gardening without increased symptoms or restriction.   [x] Progressing: [] Met: [] Not Met: [] Adjusted  5.  Patient will sleep through the evening without awakening secondary to pain.    [x] Progressing: [] Met: [] Not Met: [] Adjusted     Overall

## 2024-10-28 ENCOUNTER — HOSPITAL ENCOUNTER (OUTPATIENT)
Dept: PHYSICAL THERAPY | Age: 69
Setting detail: THERAPIES SERIES
Discharge: HOME | End: 2024-10-28
Payer: MEDICARE

## 2024-10-28 PROCEDURE — 97110 THERAPEUTIC EXERCISES: CPT | Performed by: SPECIALIST/TECHNOLOGIST

## 2024-10-28 PROCEDURE — 97140 MANUAL THERAPY 1/> REGIONS: CPT | Performed by: SPECIALIST/TECHNOLOGIST

## 2024-10-28 NOTE — FLOWSHEET NOTE
[facility]- Outpatient Rehabilitation and Therapy [address], [place] [address] office: [phone] fax: [phone]           Physical Therapy: TREATMENT/PROGRESS NOTE   Patient: Sonam Valenzuela (69 y.o. female)   Examination Date: 10/28/2024   :  1955 MRN: [id number]   Visit #: 4   Insurance Allowable Auth Needed    []Yes    []No    Insurance: Payor: MEDICARE / Plan: MEDICARE PART A AND B / Product Type: *No Product type* /   Insurance ID: [id number] - (Medicare)  Secondary Insurance (if applicable): MEDICAL MUTUAL   Treatment Diagnosis: right shoulder pain  - m25.511  No diagnosis found.   Medical Diagnosis:  Right arm pain [M79.601]   Referring Physician: Unknown, Provider  PCP: Carmelita Redmond MD     Plan of care signed (Y/N): N    Date of Patient follow up with Physician:      Plan of Care Report: NO  POC update due: (10 visits /OR AUTH LIMITS, whichever is less)  2024                                             Medical History:  Comorbidities:  Osteoarthritis  Relevant Medical History:                                          Precautions/ Contra-indications:           Latex allergy:  NO  Pacemaker:    NO  Contraindications for Manipulation: None  Date of Surgery: in   Other:    Red Flags:  None    Suicide Screening:   The patient did not verbalize a primary behavioral concern, suicidal ideation, suicidal intent, or demonstrate suicidal behaviors.    Preferred Language for Healthcare:   [x] English       [] other:    SUBJECTIVE EXAMINATION     Patient stated complaint: Pt. Reports her overall is feeling good.  Pt. Reports she is starting to use her right arm more throughout the day.         reports that she fell and landed on her hip, back, and shoulder blade a few weeks ago. Xray and CT scan were negative.  Took a dose pack and is feeling better.         Test used Initial score  10/1/24 10/28/2024   Pain Summary VAS  2/10   Functional

## 2024-10-29 ENCOUNTER — TELEPHONE (OUTPATIENT)
Dept: INTERNAL MEDICINE CLINIC | Age: 69
End: 2024-10-29

## 2024-10-29 NOTE — TELEPHONE ENCOUNTER
Last AWV:   3/24/23    Due:    NOW   Last appointment: 7/2/2024  Next appointment: 1/2/2025    Communication:     Rebecca 10/29/24  Letter 10/30/24

## 2024-11-04 ENCOUNTER — HOSPITAL ENCOUNTER (OUTPATIENT)
Dept: PHYSICAL THERAPY | Age: 69
Setting detail: THERAPIES SERIES
Discharge: HOME | End: 2024-11-04
Payer: MEDICARE

## 2024-11-04 PROCEDURE — 97110 THERAPEUTIC EXERCISES: CPT | Performed by: SPECIALIST/TECHNOLOGIST

## 2024-11-04 PROCEDURE — 97140 MANUAL THERAPY 1/> REGIONS: CPT | Performed by: SPECIALIST/TECHNOLOGIST

## 2024-11-04 NOTE — FLOWSHEET NOTE
secondary to pain.    [x] Progressing: [] Met: [] Not Met: [] Adjusted     Overall Progression Towards Functional goals/ Treatment Progress Update:  [x] Patient is progressing as expected towards functional goals listed.    [] Progression is slowed due to complexities/Impairments listed.  [] Progression has been slowed due to co-morbidities.  [] Plan just implemented, too soon (<30days) to assess goals progression   [] Goals require adjustment due to lack of progress  [] Patient is not progressing as expected and requires additional follow up with physician  [] Other:     TREATMENT PLAN     Frequency/Duration: 2x/week for 8-10 weeks for the following treatment interventions:    Interventions:  Therapeutic Exercise (13159) including: strength training, ROM, and functional mobility  Therapeutic Activities (62212) including: functional mobility training and education.  Neuromuscular Re-education (90039) activation and proprioception, including postural re-education.    Manual Therapy (95054) as indicated to include: Passive Range of Motion, Soft Tissue Mobilization, Trigger Point Release, and Myofascial Release  Modalities as needed that may include: Cryotherapy and Electrical Stimulation    Plan: POC initiated as per evaluation    Electronically Signed by Denilson Lee, PTA  [id number], Jermaine Triplett, PT, MPT   Date: 11/04/2024     Note: Portions of this note have been templated and/or copied from initial evaluation, reassessments and prior notes for documentation efficiency.    Note: If patient does not return for scheduled/recommended follow up visits, this note will serve as a discharge from care along with the most recent update on progress.    Ortho Evaluation

## 2024-11-11 ENCOUNTER — HOSPITAL ENCOUNTER (OUTPATIENT)
Dept: PHYSICAL THERAPY | Age: 69
Setting detail: THERAPIES SERIES
Discharge: HOME | End: 2024-11-11
Payer: MEDICARE

## 2024-11-11 PROCEDURE — 97140 MANUAL THERAPY 1/> REGIONS: CPT | Performed by: SPECIALIST/TECHNOLOGIST

## 2024-11-11 PROCEDURE — 97110 THERAPEUTIC EXERCISES: CPT | Performed by: SPECIALIST/TECHNOLOGIST

## 2024-11-11 NOTE — FLOWSHEET NOTE
motion, maintain or improve muscular strength or increase flexibility, following either an injury or surgery.  (45760) MANUAL THERAPY -  Manual therapy techniques, 1 or more regions, each 15 minutes (Mobilization/manipulation, manual lymphatic drainage, manual traction) for the purpose of modulating pain, promoting relaxation,  increasing ROM, reducing/eliminating soft tissue swelling/inflammation/restriction, improving soft tissue extensibility and allowing for proper ROM for normal function with self care, mobility, lifting and ambulation    GOALS     Patient stated goal: \"to get back to being pain free\"  [x] Progressing: [] Met: [] Not Met: [] Adjusted    Therapist goals for Patient:   Short Term Goals: To be achieved in: 2 weeks  1. Independent in HEP and progression per patient tolerance, in order to prevent re-injury.   [x] Progressing: [] Met: [] Not Met: [] Adjusted  2. Patient will have a decrease in pain to <5/10 to facilitate improvement in movement, function, and ADLs as indicated by Functional Deficits.  [x] Progressing: [] Met: [] Not Met: [] Adjusted    Long Term Goals: To be achieved in: 12 weeks  1. Disability index score of 12% or less for the Upper Extremity functional Scale to assist with reaching prior level of function with activities such as overhead adls.  [x] Progressing: [] Met: [] Not Met: [] Adjusted  2. Patient will demonstrate increased AROM of shoulder to 160 f, 150 ab, 60 er @ 90 without pain to allow for proper joint functioning to enable patient to drive safely.   [x] Progressing: [] Met: [] Not Met: [] Adjusted  3. Patient will demonstrate increased Strength of right shoulder to 4/5 to allow for proper functional mobility to enable patient to return to .   [x] Progressing: [] Met: [] Not Met: [] Adjusted  4. Patient will return to gardening without increased symptoms or restriction.   [x] Progressing: [] Met: [] Not Met: [] Adjusted  5.  Patient will sleep through the evening

## 2024-11-18 DIAGNOSIS — F32.A DEPRESSION, UNSPECIFIED DEPRESSION TYPE: Primary | Chronic | ICD-10-CM

## 2024-11-18 RX ORDER — VENLAFAXINE HYDROCHLORIDE 150 MG/1
CAPSULE, EXTENDED RELEASE ORAL DAILY
Qty: 90 CAPSULE | Refills: 1 | Status: SHIPPED | OUTPATIENT
Start: 2024-11-18

## 2024-11-18 NOTE — TELEPHONE ENCOUNTER
Last appointment: 7/2/2024  Next appointment: 1/2/2025  Last refill: 6/26/24  Requested Prescriptions     Pending Prescriptions Disp Refills    venlafaxine (EFFEXOR XR) 150 MG extended release capsule [Pharmacy Med Name: VENLAFAXINE HCL  MG CAP] 90 capsule 1     Sig: TAKE 1 CAPSULE BY MOUTH EVERY DAY

## 2024-12-17 NOTE — PROGRESS NOTES
Medicare Annual Wellness Visit    Sonam Valenzuela is here for Medicare AWV    Assessment & Plan   Medicare annual wellness visit, subsequent  Type 2 diabetes mellitus with obesity (HCC)  -     Albumin/Creatinine Ratio, Urine  -     Hemoglobin A1C; Future- controlled off metformin  -     POCT glycosylated hemoglobin (Hb A1C)  -     Diabetic Foot Exam    Essential hypertension  -     Not controlled.  Continue losartan, atenolol and increase to amLODIPine (NORVASC) 5 MG tablet; Take 1.5 tablets by mouth daily, Disp-135 tablet, R-0Normal    Chalazion, unspecified laterality  -     erythromycin (ROMYCIN) 5 MG/GM ophthalmic ointment; Apply 4 times daily to left upper lid, Disp-1 g, R-0, Normal  -     sulfamethoxazole-trimethoprim (BACTRIM DS;SEPTRA DS) 800-160 MG per tablet; Take 1 tablet by mouth 2 times daily for 7 days, Disp-14 tablet, R-0Print  Hyperlipidemia, unspecified hyperlipidemia type  Stable on Lovaza and statin.   Depression, unspecified depression type  Well-controlled on Effexor and methyl folate which we will continue.     Recommendations for Preventive Services Due: see orders and patient instructions/AVS.  Recommended screening schedule for the next 5-10 years is provided to the patient in written form: see Patient Instructions/AVS.     Return in about 3 months (around 3/18/2025).     Subjective     Patient complains of left upper lid swelling.      Patient's complete Health Risk Assessment and screening values have been reviewed and are found in Flowsheets. The following problems were reviewed today and where indicated follow up appointments were made and/or referrals ordered.    Positive Risk Factor Screenings with Interventions:    Fall Risk:  Do you feel unsteady or are you worried about falling? : no  2 or more falls in past year?: (!) yes  Fall with injury in past year?: (!) yes     Interventions:    Reviewed medications, home hazards, visual acuity, and co-morbidities that can increase risk for

## 2024-12-18 ENCOUNTER — OFFICE VISIT (OUTPATIENT)
Dept: INTERNAL MEDICINE CLINIC | Age: 69
End: 2024-12-18

## 2024-12-18 VITALS
BODY MASS INDEX: 29.62 KG/M2 | DIASTOLIC BLOOD PRESSURE: 86 MMHG | HEART RATE: 66 BPM | OXYGEN SATURATION: 96 % | HEIGHT: 69 IN | WEIGHT: 200 LBS | SYSTOLIC BLOOD PRESSURE: 156 MMHG

## 2024-12-18 DIAGNOSIS — Z00.00 MEDICARE ANNUAL WELLNESS VISIT, SUBSEQUENT: Primary | ICD-10-CM

## 2024-12-18 DIAGNOSIS — H00.19 CHALAZION, UNSPECIFIED LATERALITY: ICD-10-CM

## 2024-12-18 DIAGNOSIS — E66.9 TYPE 2 DIABETES MELLITUS WITH OBESITY (HCC): ICD-10-CM

## 2024-12-18 DIAGNOSIS — E11.69 TYPE 2 DIABETES MELLITUS WITH OBESITY (HCC): ICD-10-CM

## 2024-12-18 DIAGNOSIS — I10 ESSENTIAL HYPERTENSION: ICD-10-CM

## 2024-12-18 LAB — HBA1C MFR BLD: 5.6 %

## 2024-12-18 RX ORDER — SULFAMETHOXAZOLE AND TRIMETHOPRIM 800; 160 MG/1; MG/1
1 TABLET ORAL 2 TIMES DAILY
Qty: 14 TABLET | Refills: 0 | Status: SHIPPED | OUTPATIENT
Start: 2024-12-18 | End: 2024-12-25

## 2024-12-18 RX ORDER — ERYTHROMYCIN 5 MG/G
OINTMENT OPHTHALMIC
Qty: 1 G | Refills: 0 | Status: SHIPPED | OUTPATIENT
Start: 2024-12-18 | End: 2024-12-28

## 2024-12-18 RX ORDER — AMLODIPINE BESYLATE 5 MG/1
7.5 TABLET ORAL DAILY
Qty: 135 TABLET | Refills: 0 | Status: SHIPPED | OUTPATIENT
Start: 2024-12-18 | End: 2025-03-18

## 2024-12-18 SDOH — HEALTH STABILITY: PHYSICAL HEALTH: ON AVERAGE, HOW MANY MINUTES DO YOU ENGAGE IN EXERCISE AT THIS LEVEL?: 110 MIN

## 2024-12-18 SDOH — HEALTH STABILITY: PHYSICAL HEALTH: ON AVERAGE, HOW MANY DAYS PER WEEK DO YOU ENGAGE IN MODERATE TO STRENUOUS EXERCISE (LIKE A BRISK WALK)?: 6 DAYS

## 2024-12-18 ASSESSMENT — PATIENT HEALTH QUESTIONNAIRE - PHQ9
9. THOUGHTS THAT YOU WOULD BE BETTER OFF DEAD, OR OF HURTING YOURSELF: NOT AT ALL
SUM OF ALL RESPONSES TO PHQ QUESTIONS 1-9: 1
7. TROUBLE CONCENTRATING ON THINGS, SUCH AS READING THE NEWSPAPER OR WATCHING TELEVISION: NOT AT ALL
1. LITTLE INTEREST OR PLEASURE IN DOING THINGS: SEVERAL DAYS
SUM OF ALL RESPONSES TO PHQ QUESTIONS 1-9: 1
3. TROUBLE FALLING OR STAYING ASLEEP: NOT AT ALL
5. POOR APPETITE OR OVEREATING: NOT AT ALL
10. IF YOU CHECKED OFF ANY PROBLEMS, HOW DIFFICULT HAVE THESE PROBLEMS MADE IT FOR YOU TO DO YOUR WORK, TAKE CARE OF THINGS AT HOME, OR GET ALONG WITH OTHER PEOPLE: NOT DIFFICULT AT ALL
SUM OF ALL RESPONSES TO PHQ9 QUESTIONS 1 & 2: 1
4. FEELING TIRED OR HAVING LITTLE ENERGY: NOT AT ALL
SUM OF ALL RESPONSES TO PHQ QUESTIONS 1-9: 1
8. MOVING OR SPEAKING SO SLOWLY THAT OTHER PEOPLE COULD HAVE NOTICED. OR THE OPPOSITE, BEING SO FIGETY OR RESTLESS THAT YOU HAVE BEEN MOVING AROUND A LOT MORE THAN USUAL: NOT AT ALL
6. FEELING BAD ABOUT YOURSELF - OR THAT YOU ARE A FAILURE OR HAVE LET YOURSELF OR YOUR FAMILY DOWN: NOT AT ALL
2. FEELING DOWN, DEPRESSED OR HOPELESS: NOT AT ALL
SUM OF ALL RESPONSES TO PHQ QUESTIONS 1-9: 1

## 2024-12-18 ASSESSMENT — LIFESTYLE VARIABLES
HOW MANY STANDARD DRINKS CONTAINING ALCOHOL DO YOU HAVE ON A TYPICAL DAY: 1
HOW MANY STANDARD DRINKS CONTAINING ALCOHOL DO YOU HAVE ON A TYPICAL DAY: 1 OR 2
HOW OFTEN DO YOU HAVE A DRINK CONTAINING ALCOHOL: 2-3 TIMES A WEEK
HOW OFTEN DO YOU HAVE A DRINK CONTAINING ALCOHOL: 4
HOW OFTEN DO YOU HAVE SIX OR MORE DRINKS ON ONE OCCASION: 1

## 2024-12-18 NOTE — PATIENT INSTRUCTIONS
Warm compresses and topical erythromycin ointment  See eye doctor if not improving and if can't see eye doctor then take bactrim until you can see them.    Covid and shingrix at pharmacy  --  A1c and flu             Preventing Falls: Care Instructions  Injuries and health problems such as trouble walking or poor eyesight can increase your risk of falling. So can some medicines. But there are things you can do to help prevent falls. You can exercise to get stronger. You can also arrange your home to make it safer.    Talk to your doctor about the medicines you take. Ask if any of them increase the risk of falls and whether they can be changed or stopped.   Try to exercise regularly. It can help improve your strength and balance. This can help lower your risk of falling.         Practice fall safety and prevention.   Wear low-heeled shoes that fit well and give your feet good support. Talk to your doctor if you have foot problems that make this hard.  Carry a cellphone or wear a medical alert device that you can use to call for help.  Use stepladders instead of chairs to reach high objects. Don't climb if you're at risk for falls. Ask for help, if needed.  Wear the correct eyeglasses, if you need them.        Make your home safer.   Remove rugs, cords, clutter, and furniture from walkways.  Keep your house well lit. Use night-lights in hallways and bathrooms.  Install and use sturdy handrails on stairways.  Wear nonskid footwear, even inside. Don't walk barefoot or in socks without shoes.        Be safe outside.   Use handrails, curb cuts, and ramps whenever possible.  Keep your hands free by using a shoulder bag or backpack.  Try to walk in well-lit areas. Watch out for uneven ground, changes in pavement, and debris.  Be careful in the winter. Walk on the grass or gravel when sidewalks are slippery. Use de-icer on steps and walkways. Add non-slip devices to shoes.    Put grab bars and nonskid mats in your shower or

## 2024-12-19 LAB
CREAT UR-MCNC: 41.2 MG/DL (ref 28–259)
MICROALBUMIN UR DL<=1MG/L-MCNC: <1.2 MG/DL
MICROALBUMIN/CREAT UR: NORMAL MG/G (ref 0–30)

## 2024-12-23 ENCOUNTER — TELEPHONE (OUTPATIENT)
Dept: INTERNAL MEDICINE CLINIC | Age: 69
End: 2024-12-23

## 2024-12-23 NOTE — TELEPHONE ENCOUNTER
----- Message from Dr. Carmelita Redmond MD sent at 12/21/2024  9:18 AM EST -----  Call pt to cancel the 1/2/25 visit    FYI  Pt notified and appt is canceled.

## 2025-02-01 DIAGNOSIS — I10 ESSENTIAL HYPERTENSION: ICD-10-CM

## 2025-02-03 RX ORDER — ATENOLOL 50 MG/1
TABLET ORAL
Qty: 90 TABLET | Refills: 1 | Status: SHIPPED | OUTPATIENT
Start: 2025-02-03

## 2025-02-15 DIAGNOSIS — E78.5 HYPERLIPIDEMIA, UNSPECIFIED HYPERLIPIDEMIA TYPE: ICD-10-CM

## 2025-02-16 DIAGNOSIS — I10 ESSENTIAL HYPERTENSION: ICD-10-CM

## 2025-02-17 RX ORDER — ROSUVASTATIN CALCIUM 40 MG/1
TABLET, COATED ORAL
Qty: 90 TABLET | Refills: 1 | Status: SHIPPED | OUTPATIENT
Start: 2025-02-17

## 2025-02-17 RX ORDER — LOSARTAN POTASSIUM 100 MG/1
TABLET ORAL
Qty: 90 TABLET | Refills: 1 | Status: SHIPPED | OUTPATIENT
Start: 2025-02-17

## 2025-03-13 DIAGNOSIS — I10 ESSENTIAL HYPERTENSION: ICD-10-CM

## 2025-03-14 RX ORDER — AMLODIPINE BESYLATE 5 MG/1
TABLET ORAL
Qty: 135 TABLET | Refills: 1 | Status: SHIPPED | OUTPATIENT
Start: 2025-03-14

## 2025-03-19 ENCOUNTER — OFFICE VISIT (OUTPATIENT)
Dept: INTERNAL MEDICINE CLINIC | Age: 70
End: 2025-03-19
Payer: MEDICARE

## 2025-03-19 VITALS
HEART RATE: 63 BPM | OXYGEN SATURATION: 99 % | DIASTOLIC BLOOD PRESSURE: 84 MMHG | WEIGHT: 202 LBS | SYSTOLIC BLOOD PRESSURE: 138 MMHG | TEMPERATURE: 95.4 F | BODY MASS INDEX: 29.83 KG/M2

## 2025-03-19 DIAGNOSIS — E11.69 TYPE 2 DIABETES MELLITUS WITH OBESITY (HCC): ICD-10-CM

## 2025-03-19 DIAGNOSIS — E66.9 TYPE 2 DIABETES MELLITUS WITH OBESITY (HCC): Primary | ICD-10-CM

## 2025-03-19 DIAGNOSIS — I10 ESSENTIAL HYPERTENSION: ICD-10-CM

## 2025-03-19 DIAGNOSIS — E66.9 TYPE 2 DIABETES MELLITUS WITH OBESITY (HCC): ICD-10-CM

## 2025-03-19 DIAGNOSIS — E78.5 HYPERLIPIDEMIA, UNSPECIFIED HYPERLIPIDEMIA TYPE: ICD-10-CM

## 2025-03-19 DIAGNOSIS — F32.A DEPRESSION, UNSPECIFIED DEPRESSION TYPE: ICD-10-CM

## 2025-03-19 DIAGNOSIS — E11.69 TYPE 2 DIABETES MELLITUS WITH OBESITY (HCC): Primary | ICD-10-CM

## 2025-03-19 DIAGNOSIS — Z80.9 FAMILY HISTORY OF CANCER: ICD-10-CM

## 2025-03-19 PROCEDURE — G8427 DOCREV CUR MEDS BY ELIG CLIN: HCPCS | Performed by: INTERNAL MEDICINE

## 2025-03-19 PROCEDURE — 3046F HEMOGLOBIN A1C LEVEL >9.0%: CPT | Performed by: INTERNAL MEDICINE

## 2025-03-19 PROCEDURE — 3079F DIAST BP 80-89 MM HG: CPT | Performed by: INTERNAL MEDICINE

## 2025-03-19 PROCEDURE — G2211 COMPLEX E/M VISIT ADD ON: HCPCS | Performed by: INTERNAL MEDICINE

## 2025-03-19 PROCEDURE — 2022F DILAT RTA XM EVC RTNOPTHY: CPT | Performed by: INTERNAL MEDICINE

## 2025-03-19 PROCEDURE — 1160F RVW MEDS BY RX/DR IN RCRD: CPT | Performed by: INTERNAL MEDICINE

## 2025-03-19 PROCEDURE — G8399 PT W/DXA RESULTS DOCUMENT: HCPCS | Performed by: INTERNAL MEDICINE

## 2025-03-19 PROCEDURE — 1036F TOBACCO NON-USER: CPT | Performed by: INTERNAL MEDICINE

## 2025-03-19 PROCEDURE — 99214 OFFICE O/P EST MOD 30 MIN: CPT | Performed by: INTERNAL MEDICINE

## 2025-03-19 PROCEDURE — 3075F SYST BP GE 130 - 139MM HG: CPT | Performed by: INTERNAL MEDICINE

## 2025-03-19 PROCEDURE — G8417 CALC BMI ABV UP PARAM F/U: HCPCS | Performed by: INTERNAL MEDICINE

## 2025-03-19 PROCEDURE — 1090F PRES/ABSN URINE INCON ASSESS: CPT | Performed by: INTERNAL MEDICINE

## 2025-03-19 PROCEDURE — 1159F MED LIST DOCD IN RCRD: CPT | Performed by: INTERNAL MEDICINE

## 2025-03-19 PROCEDURE — 3017F COLORECTAL CA SCREEN DOC REV: CPT | Performed by: INTERNAL MEDICINE

## 2025-03-19 PROCEDURE — 1123F ACP DISCUSS/DSCN MKR DOCD: CPT | Performed by: INTERNAL MEDICINE

## 2025-03-19 RX ORDER — AMLODIPINE BESYLATE 10 MG/1
10 TABLET ORAL DAILY
Qty: 90 TABLET | Refills: 1 | Status: SHIPPED | OUTPATIENT
Start: 2025-03-19 | End: 2025-09-15

## 2025-03-19 SDOH — ECONOMIC STABILITY: FOOD INSECURITY: WITHIN THE PAST 12 MONTHS, THE FOOD YOU BOUGHT JUST DIDN'T LAST AND YOU DIDN'T HAVE MONEY TO GET MORE.: NEVER TRUE

## 2025-03-19 SDOH — ECONOMIC STABILITY: FOOD INSECURITY: WITHIN THE PAST 12 MONTHS, YOU WORRIED THAT YOUR FOOD WOULD RUN OUT BEFORE YOU GOT MONEY TO BUY MORE.: NEVER TRUE

## 2025-03-19 ASSESSMENT — PATIENT HEALTH QUESTIONNAIRE - PHQ9
9. THOUGHTS THAT YOU WOULD BE BETTER OFF DEAD, OR OF HURTING YOURSELF: NOT AT ALL
2. FEELING DOWN, DEPRESSED OR HOPELESS: NOT AT ALL
SUM OF ALL RESPONSES TO PHQ QUESTIONS 1-9: 0
4. FEELING TIRED OR HAVING LITTLE ENERGY: NOT AT ALL
SUM OF ALL RESPONSES TO PHQ QUESTIONS 1-9: 0
6. FEELING BAD ABOUT YOURSELF - OR THAT YOU ARE A FAILURE OR HAVE LET YOURSELF OR YOUR FAMILY DOWN: NOT AT ALL
7. TROUBLE CONCENTRATING ON THINGS, SUCH AS READING THE NEWSPAPER OR WATCHING TELEVISION: NOT AT ALL
1. LITTLE INTEREST OR PLEASURE IN DOING THINGS: NOT AT ALL
5. POOR APPETITE OR OVEREATING: NOT AT ALL
SUM OF ALL RESPONSES TO PHQ QUESTIONS 1-9: 0
8. MOVING OR SPEAKING SO SLOWLY THAT OTHER PEOPLE COULD HAVE NOTICED. OR THE OPPOSITE, BEING SO FIGETY OR RESTLESS THAT YOU HAVE BEEN MOVING AROUND A LOT MORE THAN USUAL: NOT AT ALL
3. TROUBLE FALLING OR STAYING ASLEEP: NOT AT ALL
10. IF YOU CHECKED OFF ANY PROBLEMS, HOW DIFFICULT HAVE THESE PROBLEMS MADE IT FOR YOU TO DO YOUR WORK, TAKE CARE OF THINGS AT HOME, OR GET ALONG WITH OTHER PEOPLE: NOT DIFFICULT AT ALL
SUM OF ALL RESPONSES TO PHQ QUESTIONS 1-9: 0

## 2025-03-19 ASSESSMENT — ENCOUNTER SYMPTOMS: SHORTNESS OF BREATH: 0

## 2025-03-19 NOTE — PROGRESS NOTES
Normal rate and regular rhythm.      Heart sounds: No murmur heard.  Pulmonary:      Effort: No respiratory distress.      Breath sounds: Normal breath sounds. No wheezing or rales.   Abdominal:      General: Bowel sounds are normal. There is no distension.      Palpations: Abdomen is soft.      Tenderness: There is no abdominal tenderness.   Musculoskeletal:         General: Normal range of motion.   Lymphadenopathy:      Cervical: No cervical adenopathy.   Skin:     General: Skin is warm and dry.   Neurological:      Mental Status: She is alert and oriented to person, place, and time.      Cranial Nerves: No cranial nerve deficit.      Sensory: No sensory deficit.      Coordination: Coordination normal.   Psychiatric:         Behavior: Behavior normal.               This note was generated completely or in part utilizing Dragon dictation speech recognition software.  Occasionally, words are mistranscribed and despite editing, the text may contain inaccuracies due to incorrect word recognition.  If further clarification is needed please contact the office at [phone]          An electronic signature was used to authenticate this note.    --CORI BAHENA MD     .

## 2025-03-20 ENCOUNTER — TELEPHONE (OUTPATIENT)
Dept: INTERNAL MEDICINE CLINIC | Age: 70
End: 2025-03-20

## 2025-03-20 LAB
ALBUMIN SERPL-MCNC: 4.8 G/DL (ref 3.4–5)
ALBUMIN/GLOB SERPL: 2.2 {RATIO} (ref 1.1–2.2)
ALP SERPL-CCNC: 96 U/L (ref 40–129)
ALT SERPL-CCNC: 23 U/L (ref 10–40)
ANION GAP SERPL CALCULATED.3IONS-SCNC: 12 MMOL/L (ref 3–16)
AST SERPL-CCNC: 29 U/L (ref 15–37)
BILIRUB SERPL-MCNC: 0.5 MG/DL (ref 0–1)
BUN SERPL-MCNC: 17 MG/DL (ref 7–20)
CALCIUM SERPL-MCNC: 10.1 MG/DL (ref 8.3–10.6)
CHLORIDE SERPL-SCNC: 101 MMOL/L (ref 99–110)
CHOLEST SERPL-MCNC: 200 MG/DL (ref 0–199)
CO2 SERPL-SCNC: 25 MMOL/L (ref 21–32)
CREAT SERPL-MCNC: 0.7 MG/DL (ref 0.6–1.2)
GFR SERPLBLD CREATININE-BSD FMLA CKD-EPI: >90 ML/MIN/{1.73_M2}
GLUCOSE SERPL-MCNC: 111 MG/DL (ref 70–99)
HDLC SERPL-MCNC: 65 MG/DL (ref 40–60)
LDLC SERPL CALC-MCNC: 86 MG/DL
POTASSIUM SERPL-SCNC: 4.5 MMOL/L (ref 3.5–5.1)
PROT SERPL-MCNC: 7 G/DL (ref 6.4–8.2)
SODIUM SERPL-SCNC: 138 MMOL/L (ref 136–145)
TRIGL SERPL-MCNC: 246 MG/DL (ref 0–150)
TSH SERPL DL<=0.005 MIU/L-ACNC: 2.67 UIU/ML (ref 0.27–4.2)
VLDLC SERPL CALC-MCNC: 49 MG/DL

## 2025-03-20 NOTE — TELEPHONE ENCOUNTER
----- Message from Dr. Carmelita Redmond MD sent at 3/19/2025 10:28 PM EDT -----  My last day is June 13 visit please move up her appointment by a week

## 2025-03-22 ENCOUNTER — RESULTS FOLLOW-UP (OUTPATIENT)
Dept: INTERNAL MEDICINE CLINIC | Age: 70
End: 2025-03-22

## 2025-03-23 ENCOUNTER — HOSPITAL ENCOUNTER (EMERGENCY)
Age: 70
Discharge: HOME | End: 2025-03-23
Attending: STUDENT IN AN ORGANIZED HEALTH CARE EDUCATION/TRAINING PROGRAM
Payer: MEDICARE

## 2025-03-23 ENCOUNTER — TELEPHONE (OUTPATIENT)
Dept: INTERNAL MEDICINE CLINIC | Age: 70
End: 2025-03-23

## 2025-03-23 VITALS
OXYGEN SATURATION: 97 % | HEART RATE: 84 BPM | TEMPERATURE: 98.4 F | BODY MASS INDEX: 30.07 KG/M2 | DIASTOLIC BLOOD PRESSURE: 94 MMHG | WEIGHT: 203 LBS | SYSTOLIC BLOOD PRESSURE: 152 MMHG | HEIGHT: 69 IN | RESPIRATION RATE: 17 BRPM

## 2025-03-23 DIAGNOSIS — M79.89 CALF SWELLING: Primary | ICD-10-CM

## 2025-03-23 DIAGNOSIS — M79.662 PAIN OF LEFT CALF: Primary | ICD-10-CM

## 2025-03-23 PROCEDURE — 6370000000 HC RX 637 (ALT 250 FOR IP): Performed by: PHYSICIAN ASSISTANT

## 2025-03-23 PROCEDURE — 99283 EMERGENCY DEPT VISIT LOW MDM: CPT

## 2025-03-23 RX ADMIN — APIXABAN 10 MG: 5 TABLET, FILM COATED ORAL at 12:29

## 2025-03-23 ASSESSMENT — PAIN DESCRIPTION - DESCRIPTORS: DESCRIPTORS: ACHING

## 2025-03-23 ASSESSMENT — PAIN SCALES - GENERAL: PAINLEVEL_OUTOF10: 5

## 2025-03-23 ASSESSMENT — PAIN DESCRIPTION - LOCATION: LOCATION: LEG

## 2025-03-23 ASSESSMENT — PAIN - FUNCTIONAL ASSESSMENT: PAIN_FUNCTIONAL_ASSESSMENT: 0-10

## 2025-03-23 ASSESSMENT — PAIN DESCRIPTION - ORIENTATION: ORIENTATION: LEFT

## 2025-03-23 NOTE — ED PROVIDER NOTES
THE [facility]  EMERGENCY DEPARTMENT ENCOUNTER          PHYSICIAN ASSISTANT NOTE       Date of evaluation: 3/23/2025    Chief Complaint     Leg Pain (Pt coming from home for L leg pain behind knee that started last night. Has hx of dvt in right leg and states pain feels similar. Denies blood thinner use or falls. )      History of Present Illness     Sonam Valenzuela is a 69 y.o. 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  Patient denies any injury or trauma.  She denies associated fevers or chills, chest pain or shortness of breath, nausea or vomiting.  She denies anticoagulant use, hormone use, recent travel or surgeries.  Denies prior history of GI bleed or contraindications to anticoagulation.    ASSESSMENT / PLAN  (MEDICAL DECISION MAKING)     INITIAL VITALS: BP: (!) 154/100, Temp: 98.4 °F (36.9 °C), Pulse: 88, Respirations: 16, SpO2: 97 %    Sonam Valenzuela is a 69 y.o. 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  She denies any chest pain or shortness of breath.  On exam, she is mildly hypertensive with otherwise normal vital signs.  Heart rate and rhythm regular.  Lungs clear to auscultation.  Patient has left calf tenderness with swelling distal to the knee. There is increased warmth. 2+ DP, PT pulses. No deformity or bony tenderness. No erythema or rash.    High clinical suspicion for recurrent DVT.  Unfortunately we do not have ultrasound on the weekend to confirm. Patient has no contraindications to anticoagulation. Will provide patient Eliquis starter pack and have patient return to [facility] tomorrow for

## 2025-03-23 NOTE — DISCHARGE INSTRUCTIONS
Start Eliquis twice daily while awaiting your ultrasound.  Return tomorrow to [facility] vascular lab for ultrasound of the left leg.  Return to the ED with any new or worsening symptoms, including any chest pain, shortness of breath, elevated heart rate.

## 2025-03-23 NOTE — ED NOTES
Patient discharged to home via family.  Written discharge instructions reviewed with understanding.  Copy of AVS and signed prescription sent home with patient. Patient able to walk from ED without assistance.       Kiki Zapata RN  03/23/25 0229

## 2025-03-23 NOTE — ED PROVIDER NOTES
ED Attending Attestation Note     Date of evaluation: 3/23/2025    I have discussed the case with the resident/NATALIE. I have personally performed a history, physical exam, and my own medical decision making. I have reviewed the note and agree with the findings and plan.     INITIAL VITALS: BP: (!) 154/100, Temp: 98.4 °F (36.9 °C), Pulse: 88, Respirations: 16, SpO2: 97 %   Physical Exam  Vitals reviewed.   HENT:      Head: Normocephalic.   Pulmonary:      Effort: Pulmonary effort is normal.   Abdominal:      Palpations: Abdomen is soft.   Skin:     Comments: 1-2+ left lower extremity pitting edema without erythema   Neurological:      Mental Status: She is alert.         MDM:  My assessment reveals a well-appearing 69-year-old female with past medical history of provoked DVT previously on warfarin, not currently anticoagulated, presenting with left lower extremity swelling.  The swelling started last night and she came to be evaluated today as she is worried that she has another DVT.  She does not have any shortness of breath or chest pain and is otherwise hemodynamically stable.  On examination, she does have left lower extremity greater than right 1-2+ pitting edema.  No evidence of cellulitis.  Unfortunately, as it is the weekend, we do not have vascular ultrasound available today.  She will be placed on an Eliquis starter pack given the high suspicion for DVT and given a referral for an outpatient duplex venous ultrasound tomorrow.         Rodney Erwin MD  03/23/25 [id number]

## 2025-03-24 ENCOUNTER — HOSPITAL ENCOUNTER (OUTPATIENT)
Dept: VASCULAR LAB | Age: 70
Discharge: HOME | End: 2025-03-26
Payer: MEDICARE

## 2025-03-24 DIAGNOSIS — M79.662 PAIN OF LEFT CALF: ICD-10-CM

## 2025-03-24 PROCEDURE — 93971 EXTREMITY STUDY: CPT | Performed by: SURGERY

## 2025-03-24 PROCEDURE — 93971 EXTREMITY STUDY: CPT

## 2025-03-24 NOTE — TELEPHONE ENCOUNTER
Call patient to follow-up her recent ER visit.  She will need Dopplers done, preferably Monday.  See order.

## 2025-03-26 ENCOUNTER — TELEPHONE (OUTPATIENT)
Dept: INTERNAL MEDICINE CLINIC | Age: 70
End: 2025-03-26

## 2025-03-26 ENCOUNTER — OFFICE VISIT (OUTPATIENT)
Dept: INTERNAL MEDICINE CLINIC | Age: 70
End: 2025-03-26
Payer: MEDICARE

## 2025-03-26 VITALS
SYSTOLIC BLOOD PRESSURE: 116 MMHG | WEIGHT: 198 LBS | BODY MASS INDEX: 29.24 KG/M2 | HEART RATE: 62 BPM | DIASTOLIC BLOOD PRESSURE: 78 MMHG | OXYGEN SATURATION: 96 %

## 2025-03-26 DIAGNOSIS — M79.89 LEG SWELLING: Primary | ICD-10-CM

## 2025-03-26 DIAGNOSIS — M79.605 PAIN AND SWELLING OF LEFT LOWER EXTREMITY: Primary | ICD-10-CM

## 2025-03-26 DIAGNOSIS — M79.89 PAIN AND SWELLING OF LEFT LOWER EXTREMITY: Primary | ICD-10-CM

## 2025-03-26 DIAGNOSIS — M79.89 LEG SWELLING: ICD-10-CM

## 2025-03-26 LAB
BASOPHILS # BLD: 0 K/UL (ref 0–0.2)
BASOPHILS NFR BLD: 0.5 %
D-DIMER QUANTITATIVE: 1.48 UG/ML FEU (ref 0–0.6)
DEPRECATED RDW RBC AUTO: 14.1 % (ref 12.4–15.4)
EOSINOPHIL # BLD: 0.1 K/UL (ref 0–0.6)
EOSINOPHIL NFR BLD: 1.8 %
HCT VFR BLD AUTO: 39 % (ref 36–48)
HGB BLD-MCNC: 13.1 G/DL (ref 12–16)
LYMPHOCYTES # BLD: 1.3 K/UL (ref 1–5.1)
LYMPHOCYTES NFR BLD: 23.6 %
MCH RBC QN AUTO: 30.4 PG (ref 26–34)
MCHC RBC AUTO-ENTMCNC: 33.7 G/DL (ref 31–36)
MCV RBC AUTO: 90.2 FL (ref 80–100)
MONOCYTES # BLD: 0.5 K/UL (ref 0–1.3)
MONOCYTES NFR BLD: 8.9 %
NEUTROPHILS # BLD: 3.7 K/UL (ref 1.7–7.7)
NEUTROPHILS NFR BLD: 65.2 %
PLATELET # BLD AUTO: 211 K/UL (ref 135–450)
PMV BLD AUTO: 7.4 FL (ref 5–10.5)
RBC # BLD AUTO: 4.32 M/UL (ref 4–5.2)
WBC # BLD AUTO: 5.7 K/UL (ref 4–11)

## 2025-03-26 PROCEDURE — G2211 COMPLEX E/M VISIT ADD ON: HCPCS | Performed by: INTERNAL MEDICINE

## 2025-03-26 PROCEDURE — 1123F ACP DISCUSS/DSCN MKR DOCD: CPT | Performed by: INTERNAL MEDICINE

## 2025-03-26 PROCEDURE — G8427 DOCREV CUR MEDS BY ELIG CLIN: HCPCS | Performed by: INTERNAL MEDICINE

## 2025-03-26 PROCEDURE — 1159F MED LIST DOCD IN RCRD: CPT | Performed by: INTERNAL MEDICINE

## 2025-03-26 PROCEDURE — 3074F SYST BP LT 130 MM HG: CPT | Performed by: INTERNAL MEDICINE

## 2025-03-26 PROCEDURE — 1160F RVW MEDS BY RX/DR IN RCRD: CPT | Performed by: INTERNAL MEDICINE

## 2025-03-26 PROCEDURE — 3078F DIAST BP <80 MM HG: CPT | Performed by: INTERNAL MEDICINE

## 2025-03-26 PROCEDURE — 99214 OFFICE O/P EST MOD 30 MIN: CPT | Performed by: INTERNAL MEDICINE

## 2025-03-26 PROCEDURE — G8399 PT W/DXA RESULTS DOCUMENT: HCPCS | Performed by: INTERNAL MEDICINE

## 2025-03-26 PROCEDURE — 1090F PRES/ABSN URINE INCON ASSESS: CPT | Performed by: INTERNAL MEDICINE

## 2025-03-26 PROCEDURE — 1036F TOBACCO NON-USER: CPT | Performed by: INTERNAL MEDICINE

## 2025-03-26 PROCEDURE — G8417 CALC BMI ABV UP PARAM F/U: HCPCS | Performed by: INTERNAL MEDICINE

## 2025-03-26 PROCEDURE — 3017F COLORECTAL CA SCREEN DOC REV: CPT | Performed by: INTERNAL MEDICINE

## 2025-03-26 NOTE — PROGRESS NOTES
(ASCORBIC ACID) 500 MG tablet Take 1 tablet by mouth daily       No current facility-administered medications for this visit.       Physical Exam  Vitals and nursing note reviewed.   Constitutional:       General: She is not in acute distress.     Appearance: She is well-developed.   HENT:      Head: Normocephalic and atraumatic.      Right Ear: External ear normal.      Left Ear: External ear normal.      Nose: Nose normal.   Eyes:      General: No scleral icterus.     Pupils: Pupils are equal, round, and reactive to light.   Neck:      Thyroid: No thyromegaly.   Cardiovascular:      Rate and Rhythm: Normal rate and regular rhythm.      Heart sounds: No murmur heard.  Pulmonary:      Effort: No respiratory distress.      Breath sounds: Normal breath sounds. No wheezing or rales.   Abdominal:      General: Bowel sounds are normal. There is no distension.      Palpations: Abdomen is soft.      Tenderness: There is no abdominal tenderness.   Musculoskeletal:         General: No swelling (behind left knee). Normal range of motion.      Right lower leg: No edema.      Left lower leg: No edema (see photo).      Comments: Pos miki's   Lymphadenopathy:      Cervical: No cervical adenopathy.      Lower Body: No right inguinal adenopathy. No left inguinal adenopathy.   Skin:     General: Skin is warm and dry.   Neurological:      Mental Status: She is alert and oriented to person, place, and time.      Cranial Nerves: No cranial nerve deficit.      Sensory: No sensory deficit.      Coordination: Coordination normal.      Gait: Gait abnormal.   Psychiatric:         Behavior: Behavior normal.           On this date 3/26/2025 I have spent 30 minutes reviewing previous notes, test results and face to face with the patient discussing the diagnosis and importance of compliance with the treatment plan as well as documenting on the day of the visit.    This note was generated completely or in part utilizing Dragon dictation speech

## 2025-03-26 NOTE — TELEPHONE ENCOUNTER
Since no response from [facility], please let's try again to get this done Thursday AM    (Pt is trying to call [facility] this garrett to see if they can schedule her)

## 2025-03-27 NOTE — TELEPHONE ENCOUNTER
The patient called into the office for Leander in regards to wanting to let her know that she missed her appt for her MRI today @ [facility]. The patient got lost trying to locate the place, but powers does have an appt with [facility] in [facility] to get the MRI done tomorrow @ 6:30 pm. If Dr. Redmond would like for her to go to another [facility] location to let her know. Please advise.

## 2025-03-27 NOTE — TELEPHONE ENCOUNTER
I called and spoke to patient and she had a vm to be scheduled for today at 3pm. She is going to call them back and confirm it and then call our office back to let us know.

## 2025-03-28 ENCOUNTER — OFFICE VISIT (OUTPATIENT)
Dept: VASCULAR SURGERY | Age: 70
End: 2025-03-28
Payer: MEDICARE

## 2025-03-28 DIAGNOSIS — R23.3 SPONTANEOUS HEMATOMA OF LOWER LEG: Primary | ICD-10-CM

## 2025-03-28 PROCEDURE — G8399 PT W/DXA RESULTS DOCUMENT: HCPCS | Performed by: SURGERY

## 2025-03-28 PROCEDURE — 3017F COLORECTAL CA SCREEN DOC REV: CPT | Performed by: SURGERY

## 2025-03-28 PROCEDURE — 1090F PRES/ABSN URINE INCON ASSESS: CPT | Performed by: SURGERY

## 2025-03-28 PROCEDURE — G8417 CALC BMI ABV UP PARAM F/U: HCPCS | Performed by: SURGERY

## 2025-03-28 PROCEDURE — G8427 DOCREV CUR MEDS BY ELIG CLIN: HCPCS | Performed by: SURGERY

## 2025-03-28 PROCEDURE — 1036F TOBACCO NON-USER: CPT | Performed by: SURGERY

## 2025-03-28 PROCEDURE — 99204 OFFICE O/P NEW MOD 45 MIN: CPT | Performed by: SURGERY

## 2025-03-28 PROCEDURE — 1123F ACP DISCUSS/DSCN MKR DOCD: CPT | Performed by: SURGERY

## 2025-03-28 PROCEDURE — 1159F MED LIST DOCD IN RCRD: CPT | Performed by: SURGERY

## 2025-04-03 ENCOUNTER — TELEPHONE (OUTPATIENT)
Dept: INTERNAL MEDICINE CLINIC | Age: 70
End: 2025-04-03

## 2025-04-03 NOTE — TELEPHONE ENCOUNTER
----- Message from JOHNNA DA SILVA MA sent at 4/3/2025  4:27 PM EDT -----    ----- Message -----  From: Carmelita Redmond MD  Sent: 3/19/2025  10:21 AM EDT  To: [facility]; #    Get notes from pod dr delgado

## 2025-04-13 ASSESSMENT — SLEEP AND FATIGUE QUESTIONNAIRES
HOW LIKELY ARE YOU TO NOD OFF OR FALL ASLEEP WHILE LYING DOWN TO REST IN THE AFTERNOON WHEN CIRCUMSTANCES PERMIT: MODERATE CHANCE OF DOZING
HOW LIKELY ARE YOU TO NOD OFF OR FALL ASLEEP WHILE WATCHING TV: SLIGHT CHANCE OF DOZING
HOW LIKELY ARE YOU TO NOD OFF OR FALL ASLEEP WHILE SITTING INACTIVE IN A PUBLIC PLACE: WOULD NEVER DOZE
HOW LIKELY ARE YOU TO NOD OFF OR FALL ASLEEP WHILE SITTING INACTIVE IN A PUBLIC PLACE: WOULD NEVER DOZE
HOW LIKELY ARE YOU TO NOD OFF OR FALL ASLEEP WHILE SITTING AND READING: SLIGHT CHANCE OF DOZING
HOW LIKELY ARE YOU TO NOD OFF OR FALL ASLEEP WHILE SITTING AND TALKING TO SOMEONE: WOULD NEVER DOZE
HOW LIKELY ARE YOU TO NOD OFF OR FALL ASLEEP WHILE WATCHING TV: SLIGHT CHANCE OF DOZING
HOW LIKELY ARE YOU TO NOD OFF OR FALL ASLEEP WHEN YOU ARE A PASSENGER IN A CAR FOR AN HOUR WITHOUT A BREAK: SLIGHT CHANCE OF DOZING
HOW LIKELY ARE YOU TO NOD OFF OR FALL ASLEEP WHILE SITTING QUIETLY AFTER LUNCH WITHOUT ALCOHOL: SLIGHT CHANCE OF DOZING
HOW LIKELY ARE YOU TO NOD OFF OR FALL ASLEEP WHILE SITTING QUIETLY AFTER LUNCH WITHOUT ALCOHOL: SLIGHT CHANCE OF DOZING
HOW LIKELY ARE YOU TO NOD OFF OR FALL ASLEEP WHILE SITTING AND READING: SLIGHT CHANCE OF DOZING
HOW LIKELY ARE YOU TO NOD OFF OR FALL ASLEEP IN A CAR, WHILE STOPPED FOR A FEW MINUTES IN TRAFFIC: WOULD NEVER DOZE
ESS TOTAL SCORE: 6
HOW LIKELY ARE YOU TO NOD OFF OR FALL ASLEEP WHEN YOU ARE A PASSENGER IN A CAR FOR AN HOUR WITHOUT A BREAK: SLIGHT CHANCE OF DOZING
HOW LIKELY ARE YOU TO NOD OFF OR FALL ASLEEP IN A CAR, WHILE STOPPED FOR A FEW MINUTES IN TRAFFIC: WOULD NEVER DOZE
HOW LIKELY ARE YOU TO NOD OFF OR FALL ASLEEP WHILE SITTING AND TALKING TO SOMEONE: WOULD NEVER DOZE
HOW LIKELY ARE YOU TO NOD OFF OR FALL ASLEEP WHILE LYING DOWN TO REST IN THE AFTERNOON WHEN CIRCUMSTANCES PERMIT: MODERATE CHANCE OF DOZING

## 2025-04-16 ENCOUNTER — OFFICE VISIT (OUTPATIENT)
Dept: PULMONOLOGY | Age: 70
End: 2025-04-16
Payer: MEDICARE

## 2025-04-16 VITALS
OXYGEN SATURATION: 97 % | HEIGHT: 69 IN | HEART RATE: 74 BPM | DIASTOLIC BLOOD PRESSURE: 80 MMHG | BODY MASS INDEX: 31.13 KG/M2 | SYSTOLIC BLOOD PRESSURE: 120 MMHG | WEIGHT: 210.2 LBS

## 2025-04-16 DIAGNOSIS — E11.69 TYPE 2 DIABETES MELLITUS WITH OBESITY (HCC): ICD-10-CM

## 2025-04-16 DIAGNOSIS — G47.33 OBSTRUCTIVE SLEEP APNEA SYNDROME: Primary | Chronic | ICD-10-CM

## 2025-04-16 DIAGNOSIS — I10 ESSENTIAL HYPERTENSION: Chronic | ICD-10-CM

## 2025-04-16 DIAGNOSIS — E66.811 OBESITY (BMI 30.0-34.9): ICD-10-CM

## 2025-04-16 DIAGNOSIS — E66.9 TYPE 2 DIABETES MELLITUS WITH OBESITY (HCC): ICD-10-CM

## 2025-04-16 PROCEDURE — 3046F HEMOGLOBIN A1C LEVEL >9.0%: CPT | Performed by: NURSE PRACTITIONER

## 2025-04-16 PROCEDURE — G8399 PT W/DXA RESULTS DOCUMENT: HCPCS | Performed by: NURSE PRACTITIONER

## 2025-04-16 PROCEDURE — 99214 OFFICE O/P EST MOD 30 MIN: CPT | Performed by: NURSE PRACTITIONER

## 2025-04-16 PROCEDURE — 1159F MED LIST DOCD IN RCRD: CPT | Performed by: NURSE PRACTITIONER

## 2025-04-16 PROCEDURE — 1123F ACP DISCUSS/DSCN MKR DOCD: CPT | Performed by: NURSE PRACTITIONER

## 2025-04-16 PROCEDURE — 1090F PRES/ABSN URINE INCON ASSESS: CPT | Performed by: NURSE PRACTITIONER

## 2025-04-16 PROCEDURE — 3079F DIAST BP 80-89 MM HG: CPT | Performed by: NURSE PRACTITIONER

## 2025-04-16 PROCEDURE — 3074F SYST BP LT 130 MM HG: CPT | Performed by: NURSE PRACTITIONER

## 2025-04-16 PROCEDURE — 1036F TOBACCO NON-USER: CPT | Performed by: NURSE PRACTITIONER

## 2025-04-16 PROCEDURE — G8417 CALC BMI ABV UP PARAM F/U: HCPCS | Performed by: NURSE PRACTITIONER

## 2025-04-16 PROCEDURE — 1160F RVW MEDS BY RX/DR IN RCRD: CPT | Performed by: NURSE PRACTITIONER

## 2025-04-16 PROCEDURE — 3017F COLORECTAL CA SCREEN DOC REV: CPT | Performed by: NURSE PRACTITIONER

## 2025-04-16 PROCEDURE — G2211 COMPLEX E/M VISIT ADD ON: HCPCS | Performed by: NURSE PRACTITIONER

## 2025-04-16 PROCEDURE — G8427 DOCREV CUR MEDS BY ELIG CLIN: HCPCS | Performed by: NURSE PRACTITIONER

## 2025-04-16 PROCEDURE — 2022F DILAT RTA XM EVC RTNOPTHY: CPT | Performed by: NURSE PRACTITIONER

## 2025-04-16 NOTE — PROGRESS NOTES
[facility]  [address]  [address]  [place] [address]  P- [phone]  F- [phone]   [facility] PHYSICIANS [place] [facility] SLEEP MEDICINE  [address]  [address]  [facility] [address]  Dept: [phone]  Dept Fax: [phone]  Loc: [phone]      Assessment/Plan:      1. Obstructive sleep apnea syndrome  Assessment & Plan:  Chronic-Stable: Reviewed and analyzed results of physiologic download from patient's machine and reviewed with patient.  Supplies and parts as needed for her machine.  These are medically necessary.  Limit caffeine use after 3pm. Based on the analyzed data will continue with current settings. Stable on her machine at current settings, getting benefit from the use, and having minimal side effects.  Will place order for a new machine.  Her machine is over 5 years old, is medically necessary, and needs replaced.  She is getting benefit from machine use.  Will see her back in 2-3 months.  Encouraged her to contact the office with any questions or concerns    2. Essential hypertension  Assessment & Plan:  Chronic- Stable.  Discussed the importance of treating obstructive sleep apnea as part of the management of this disorder.  Cont any meds per PCP and other physicians.    3. Type 2 diabetes mellitus with obesity (HCC)  Assessment & Plan:   Chronic- Stable.  Discussed the importance of treating obstructive sleep apnea as part of the management of this disorder.  Cont any meds per PCP and other physicians.    4. Obesity (BMI 30.0-34.9)  Assessment & Plan:  Chronic-not stable:  Discussed importance of treating obstructive sleep apnea and getting sufficient sleep to assist with weight control.  Discussed weight gain and/or weight loss may require adjustments to machine settings. Encouraged her to work on weight loss through diet and exercise.         Reviewed, analyzed, and documented physiologic data

## 2025-04-16 NOTE — PROGRESS NOTES
Diagnosis: [x] DEJA (G47.33) [] CSA (G47.31) [] Apnea (G47.30)   Length of Need: [x] 18 Months [] 99 Months [] Other:   Machine (JONA!): [] Respironics Dream Station      Auto [x] ResMed AirSense     Auto with modem for remote monitoring [] Other:     [x]  CPAP () [] Bilevel ()   Mode: [x] Auto [] Spontaneous    Mode: [] Auto [] Spontaneous          APAP - Settings  Pressure Min: 10 cmH2O  Pressure Max: 20 cmH2O               Comfort Settings: Ramp Pressure: 5 cmH2O  Ramp time: 15 min  Flex/EPR - 3 full time                                  For ResMed Bileve (TiMax-4 sec   TiMin- 0.2 sec)     Humidifier: [x] Heated ()        [x] Water chamber replacement ()/ 1 per 6 months        Mask:   [x] Nasal () /1 per 3 months [] Full Face () /1 per 3 months   [x] Patient choice -Size and fit mask [] Patient Choice - Size and fit mask   [] Dispense: [] Dispense:   [x] Headgear () / 1 per 3 months [] Headgear () / 1 per 3 months   [x] Replacement Nasal Cushion ()/2 per month [] Interface Replacement ()/1 per month   [] Replacement Nasal Pillows ()/2 per month         Tubing: [x] Heated ()/1 per 3 months    [] Standard ()/1 per 3 months [] Other:           Filters: [x] Non-disposable ()/1 per 6 months     [x] Ultra-Fine, Disposable ()/2 per month        Miscellaneous: [] Chin Strap ()/ 1 per 6 months [] O2 bleed-in:        LPM   [] Oxymetry on CPAP/Bilevel []  Other:         Start Order Date: 04/16/25    MEDICAL JUSTIFICATION:  I, the undersigned, certify that the above prescribed supplies are medically necessary for this patient’s wellbeing.  In my opinion, the supplies are both reasonable and necessary in reference to accepted standards of medicalpractice in treatment of this patient’s condition.    SUZY STRATTON NP    NPI: [id number]       Order Signed Date: 04/16/25  [facility],

## 2025-04-16 NOTE — ASSESSMENT & PLAN NOTE
Chronic-Stable: Reviewed and analyzed results of physiologic download from patient's machine and reviewed with patient.  Supplies and parts as needed for her machine.  These are medically necessary.  Limit caffeine use after 3pm. Based on the analyzed data will continue with current settings. Stable on her machine at current settings, getting benefit from the use, and having minimal side effects.  Will place order for a new machine.  Her machine is over 5 years old, is medically necessary, and needs replaced.  She is getting benefit from machine use.  Will see her back in 2-3 months.  Encouraged her to contact the office with any questions or concerns

## 2025-04-16 NOTE — PROGRESS NOTES
Diagnosis: [x] DEJA (G47.33) [] CSA (G47.31) [] Apnea (G47.30)   Length of Need: [x] 15 Months [] 99 Months [] Other:   Machine (JONA!): [] Respironics Dream Station      Auto [] ResMed AirSense     Auto [] Other:     []  CPAP () [] Bilevel ()   Mode: [] Auto [] Spontaneous    Mode: [] Auto [] Spontaneous            Comfort Settings:      Humidifier: [] Heated ()        [x] Water chamber replacement ()/ 1 per 6 months        Mask:   [x] Nasal () /1 per 3 months [] Full Face () /1 per 3 months   [x] Patient choice -Size and fit mask [] Patient Choice - Size and fit mask   [] Dispense: [] Dispense:   [x] Headgear () / 1 per 3 months [] Headgear () / 1 per 3 months   [x] Replacement Nasal Cushion ()/2 per month [] Interface Replacement ()/1 per month   [] Replacement Nasal Pillows ()/2 per month         Tubing: [x] Heated ()/1 per 3 months    [] Standard ()/1 per 3 months [] Other:           Filters: [x] Non-disposable ()/1 per 6 months     [x] Ultra-Fine, Disposable ()/2 per month        Miscellaneous: [x] Chin Strap ()/ 1 per 6 months [] O2 bleed-in:        LPM   [] Oxymetry on CPAP/Bilevel []  Other:         Start Order Date: 04/16/25    MEDICAL JUSTIFICATION:  I, the undersigned, certify that the above prescribed supplies are medically necessary for this patient’s wellbeing.  In my opinion, the supplies are both reasonable and necessary in reference to accepted standards of medicalpractice in treatment of this patient’s condition.    SUZY STRATTON NP    NPI: [id number]       Order Signed Date: 04/16/25  [facility]  Pulmonary, Sleep, and Critical Care    Pulmonary, Sleep, and Critical Care  [facility] [address]                          [address] [place], [place] [address], [place] [address]                                    [place] [address]  Phone: [phone]    Fax:

## 2025-04-21 ENCOUNTER — TELEPHONE (OUTPATIENT)
Dept: INTERNAL MEDICINE CLINIC | Age: 70
End: 2025-04-21

## 2025-04-21 DIAGNOSIS — M79.89 CALF SWELLING: Primary | ICD-10-CM

## 2025-04-21 DIAGNOSIS — M79.89 LEG SWELLING: ICD-10-CM

## 2025-04-21 DIAGNOSIS — I10 ESSENTIAL HYPERTENSION: ICD-10-CM

## 2025-04-21 RX ORDER — ATENOLOL 50 MG/1
75 TABLET ORAL DAILY
Qty: 135 TABLET | Refills: 1 | Status: SHIPPED | OUTPATIENT
Start: 2025-04-21 | End: 2025-10-18

## 2025-04-21 NOTE — TELEPHONE ENCOUNTER
Patient states she is still having left leg swelling. Patient was seen in office on 3/26/2025. States she has been to the ER, gone to vascular dr and has had doppler. States the leg is \"hard as a rock\". Patient has been elevating but has not been using compression stocking as it was too painful. No weeping or SOB. Please advise

## 2025-04-21 NOTE — TELEPHONE ENCOUNTER
Patient gave verbal understanding. She is calling to schedule MRI right now.     Wants  opinion on whether pt needs to see  or not and will discuss at appt.

## 2025-04-21 NOTE — TELEPHONE ENCOUNTER
Last appointment: 3/26/2025  Next appointment: 6/2/2025  Last refill: 02/03/2025    She takes 1.5 tablets daily

## 2025-04-21 NOTE — TELEPHONE ENCOUNTER
Please asked patient to get the MRI ordered from March 26.  Would also advise her to reach out again to Dr. Henning.  If symptoms are severe, I would advise ER.  Otherwise I can see her again in the office on Wednesday.

## 2025-04-23 ENCOUNTER — TELEPHONE (OUTPATIENT)
Dept: INTERNAL MEDICINE CLINIC | Age: 70
End: 2025-04-23

## 2025-04-23 ENCOUNTER — OFFICE VISIT (OUTPATIENT)
Dept: INTERNAL MEDICINE CLINIC | Age: 70
End: 2025-04-23
Payer: MEDICARE

## 2025-04-23 VITALS
HEART RATE: 58 BPM | WEIGHT: 213 LBS | SYSTOLIC BLOOD PRESSURE: 124 MMHG | BODY MASS INDEX: 31.45 KG/M2 | OXYGEN SATURATION: 98 % | DIASTOLIC BLOOD PRESSURE: 74 MMHG

## 2025-04-23 DIAGNOSIS — M79.89 CALF SWELLING: Primary | ICD-10-CM

## 2025-04-23 PROCEDURE — 3017F COLORECTAL CA SCREEN DOC REV: CPT | Performed by: INTERNAL MEDICINE

## 2025-04-23 PROCEDURE — 1090F PRES/ABSN URINE INCON ASSESS: CPT | Performed by: INTERNAL MEDICINE

## 2025-04-23 PROCEDURE — 3078F DIAST BP <80 MM HG: CPT | Performed by: INTERNAL MEDICINE

## 2025-04-23 PROCEDURE — 3074F SYST BP LT 130 MM HG: CPT | Performed by: INTERNAL MEDICINE

## 2025-04-23 PROCEDURE — G8417 CALC BMI ABV UP PARAM F/U: HCPCS | Performed by: INTERNAL MEDICINE

## 2025-04-23 PROCEDURE — 99213 OFFICE O/P EST LOW 20 MIN: CPT | Performed by: INTERNAL MEDICINE

## 2025-04-23 PROCEDURE — G8399 PT W/DXA RESULTS DOCUMENT: HCPCS | Performed by: INTERNAL MEDICINE

## 2025-04-23 PROCEDURE — G8427 DOCREV CUR MEDS BY ELIG CLIN: HCPCS | Performed by: INTERNAL MEDICINE

## 2025-04-23 PROCEDURE — 1123F ACP DISCUSS/DSCN MKR DOCD: CPT | Performed by: INTERNAL MEDICINE

## 2025-04-23 PROCEDURE — 1036F TOBACCO NON-USER: CPT | Performed by: INTERNAL MEDICINE

## 2025-04-23 NOTE — PROGRESS NOTES
Sonam Valenzuela (:  1955) is a 69 y.o. female, here for evaluation of the following chief complaint(s):    Leg Swelling (/)      ASSESSMENT/PLAN:  1. Calf swelling  -     Valeri Ledbetter MD, Orthopaedics and Sports Medicine (Hip; Knee; Shoulder), [facility]  Evaluate for possible compartment syndrome versus slowly resolving hematoma.  Depending on timing with orthopedics appointment, patient should get previously ordered MRI of lower leg.    Return if symptoms worsen or fail to improve.    SUBJECTIVE/OBJECTIVE:  HPI  Patient with persistent left leg swelling.  The left calf continues to be tense and painful.  The discoloration has improved.  She was evaluated by vascular surgery and symptoms were felt secondary to spontaneous hematoma.  She had Doppler ultrasound.  It hurts when she wears a compression stocking.    Review of Systems  Denies left leg numbness or weakness    Past Medical History:   Diagnosis Date    Abnormal EKG     saw cardio, wnl stress test 3/20    Allergic rhinitis     spring trees    Arthritis     Bursitis 3/2022    Left elbow    Depression     Depression     Diabetes mellitus type 2 in obese     Endometriosis     Essential hypertension     Headache     History of thrombophlebitis     right leg    Hyperlipidemia     DEJA on CPAP     cpap    Osteoarthritis     Plantar fasciitis     Recurrent sinus infections     Recurrent upper respiratory infection (URI)        Current Outpatient Medications   Medication Sig Dispense Refill    atenolol (TENORMIN) 50 MG tablet Take 1.5 tablets by mouth daily 135 tablet 1    amLODIPine (NORVASC) 10 MG tablet Take 1 tablet by mouth daily 90 tablet 1    rosuvastatin (CRESTOR) 40 MG tablet TAKE 1 TABLET BY MOUTH EVERY DAY 90 tablet 1    losartan (COZAAR) 100 MG tablet TAKE 1 TABLET BY MOUTH EVERY DAY 90 tablet 1    venlafaxine (EFFEXOR XR) 150 MG extended release capsule TAKE 1 CAPSULE BY MOUTH EVERY DAY 90 capsule 1

## 2025-04-23 NOTE — TELEPHONE ENCOUNTER
PT WILL CALL BACK TO SCHEDULE ORTHO APPT. SHE WAS ADVISED THAT A TICKET WILL BE PLACED AND SOMEONE WILL REACH OUT TOMORROW AS WELL.

## 2025-04-24 ENCOUNTER — OFFICE VISIT (OUTPATIENT)
Dept: ORTHOPEDIC SURGERY | Age: 70
End: 2025-04-24
Payer: MEDICARE

## 2025-04-24 VITALS — HEIGHT: 69 IN | BODY MASS INDEX: 31.55 KG/M2 | WEIGHT: 213 LBS

## 2025-04-24 DIAGNOSIS — M79.89 SWELLING OF CALF: Primary | ICD-10-CM

## 2025-04-24 DIAGNOSIS — M79.662 PAIN OF LEFT LOWER LEG: ICD-10-CM

## 2025-04-24 PROCEDURE — G8427 DOCREV CUR MEDS BY ELIG CLIN: HCPCS | Performed by: ORTHOPAEDIC SURGERY

## 2025-04-24 PROCEDURE — 1159F MED LIST DOCD IN RCRD: CPT | Performed by: ORTHOPAEDIC SURGERY

## 2025-04-24 PROCEDURE — G8417 CALC BMI ABV UP PARAM F/U: HCPCS | Performed by: ORTHOPAEDIC SURGERY

## 2025-04-24 PROCEDURE — 3017F COLORECTAL CA SCREEN DOC REV: CPT | Performed by: ORTHOPAEDIC SURGERY

## 2025-04-24 PROCEDURE — 99203 OFFICE O/P NEW LOW 30 MIN: CPT | Performed by: ORTHOPAEDIC SURGERY

## 2025-04-24 PROCEDURE — 1125F AMNT PAIN NOTED PAIN PRSNT: CPT | Performed by: ORTHOPAEDIC SURGERY

## 2025-04-24 PROCEDURE — G8399 PT W/DXA RESULTS DOCUMENT: HCPCS | Performed by: ORTHOPAEDIC SURGERY

## 2025-04-24 PROCEDURE — 1090F PRES/ABSN URINE INCON ASSESS: CPT | Performed by: ORTHOPAEDIC SURGERY

## 2025-04-24 PROCEDURE — 1123F ACP DISCUSS/DSCN MKR DOCD: CPT | Performed by: ORTHOPAEDIC SURGERY

## 2025-04-24 PROCEDURE — 1036F TOBACCO NON-USER: CPT | Performed by: ORTHOPAEDIC SURGERY

## 2025-04-24 NOTE — PROGRESS NOTES
[facility]  Office Visit    Chief Complaint    Leg Pain (Left leg)      History of Present Illness:  Sonam Valenzuela is a 69 y.o. female who presents for 4 weeks of left calf swelling. Atraumatic, appeared one dimitry in AM. Went to ED, subsequent dopplers  2, outpatient vascular consult with Dr Henning. MRI left leg pending. Denies foot numbness.     Patient describes their pain as 5/10, dull, achy, sharp, worse with  walking/bending sitting. The patient denies any specific injury.  The patient denies any clicking, popping, or locking of the knee or ankle joint. The patient denies any numbness, paresthesias, or weakness.      Pain Assessment  Location of Pain: Leg  Location Modifiers: Left  Severity of Pain: 5  Quality of Pain: Throbbing  Duration of Pain: Persistent  Frequency of Pain: Constant  Aggravating Factors: Walking, Bending, Other (Comment) (SITTING)  Limiting Behavior: Yes  Relieving Factors: Rest  Result of Injury: Yes  Work-Related Injury: No  Are there other pain locations you wish to document?: No    Past Medical History:   Diagnosis Date    Abnormal EKG     saw cardio, wnl stress test 3/20    Allergic rhinitis     spring trees    Arthritis     Bursitis 3/2022    Left elbow    Depression     Depression     Diabetes mellitus type 2 in obese     Endometriosis     Essential hypertension     Headache     History of thrombophlebitis     right leg    Hyperlipidemia     DEJA on CPAP     cpap    Osteoarthritis 2022    Plantar fasciitis     Recurrent sinus infections     Recurrent upper respiratory infection (URI)         Past Surgical History:   Procedure Laterality Date    ABDOMEN SURGERY  1/2010    Colon refraction    ANUS SURGERY      anal fissure    COLONOSCOPY  09/2014    recheck 9/19    COLONOSCOPY  04/2019    fu 10 yrs    CYST REMOVAL      right foot    FOOT SURGERY  11/1997    Cyst removal    HIP SURGERY  8/2011    Right hip replacement    JOINT REPLACEMENT  2011

## 2025-05-05 ENCOUNTER — HOSPITAL ENCOUNTER (OUTPATIENT)
Dept: MRI IMAGING | Age: 70
Discharge: HOME | End: 2025-05-05
Payer: MEDICARE

## 2025-05-05 DIAGNOSIS — M79.89 LEG SWELLING: ICD-10-CM

## 2025-05-05 DIAGNOSIS — M79.89 CALF SWELLING: ICD-10-CM

## 2025-05-05 PROCEDURE — 6360000004 HC RX CONTRAST MEDICATION: Performed by: INTERNAL MEDICINE

## 2025-05-05 PROCEDURE — A9576 INJ PROHANCE MULTIPACK: HCPCS | Performed by: INTERNAL MEDICINE

## 2025-05-05 PROCEDURE — 73720 MRI LWR EXTREMITY W/O&W/DYE: CPT

## 2025-05-05 RX ADMIN — GADOTERIDOL 19 ML: 279.3 INJECTION, SOLUTION INTRAVENOUS at 18:27

## 2025-05-06 ENCOUNTER — RESULTS FOLLOW-UP (OUTPATIENT)
Dept: INTERNAL MEDICINE CLINIC | Age: 70
End: 2025-05-06

## 2025-05-06 DIAGNOSIS — M79.89 LEG SWELLING: Primary | ICD-10-CM

## 2025-05-06 RX ORDER — DOXYCYCLINE HYCLATE 100 MG
100 TABLET ORAL 2 TIMES DAILY
Qty: 20 TABLET | Refills: 0 | Status: SHIPPED | OUTPATIENT
Start: 2025-05-06 | End: 2025-05-16

## 2025-05-23 DIAGNOSIS — F32.A DEPRESSION, UNSPECIFIED DEPRESSION TYPE: Chronic | ICD-10-CM

## 2025-05-23 RX ORDER — VENLAFAXINE HYDROCHLORIDE 150 MG/1
CAPSULE, EXTENDED RELEASE ORAL DAILY
Qty: 90 CAPSULE | Refills: 1 | Status: SHIPPED | OUTPATIENT
Start: 2025-05-23

## 2025-06-02 ENCOUNTER — OFFICE VISIT (OUTPATIENT)
Dept: INTERNAL MEDICINE CLINIC | Age: 70
End: 2025-06-02
Payer: MEDICARE

## 2025-06-02 ENCOUNTER — RESULTS FOLLOW-UP (OUTPATIENT)
Dept: INTERNAL MEDICINE CLINIC | Age: 70
End: 2025-06-02

## 2025-06-02 VITALS
OXYGEN SATURATION: 99 % | BODY MASS INDEX: 29.18 KG/M2 | DIASTOLIC BLOOD PRESSURE: 86 MMHG | SYSTOLIC BLOOD PRESSURE: 130 MMHG | WEIGHT: 197.6 LBS | HEART RATE: 66 BPM

## 2025-06-02 DIAGNOSIS — E66.9 TYPE 2 DIABETES MELLITUS WITH OBESITY (HCC): Primary | ICD-10-CM

## 2025-06-02 DIAGNOSIS — I10 ESSENTIAL HYPERTENSION: ICD-10-CM

## 2025-06-02 DIAGNOSIS — M79.89 LEG SWELLING: ICD-10-CM

## 2025-06-02 DIAGNOSIS — E11.69 TYPE 2 DIABETES MELLITUS WITH OBESITY (HCC): Primary | ICD-10-CM

## 2025-06-02 DIAGNOSIS — E78.5 HYPERLIPIDEMIA, UNSPECIFIED HYPERLIPIDEMIA TYPE: ICD-10-CM

## 2025-06-02 DIAGNOSIS — Z12.31 ENCOUNTER FOR SCREENING MAMMOGRAM FOR MALIGNANT NEOPLASM OF BREAST: ICD-10-CM

## 2025-06-02 LAB — HBA1C MFR BLD: 5.4 %

## 2025-06-02 PROCEDURE — 3017F COLORECTAL CA SCREEN DOC REV: CPT | Performed by: INTERNAL MEDICINE

## 2025-06-02 PROCEDURE — G8399 PT W/DXA RESULTS DOCUMENT: HCPCS | Performed by: INTERNAL MEDICINE

## 2025-06-02 PROCEDURE — 1090F PRES/ABSN URINE INCON ASSESS: CPT | Performed by: INTERNAL MEDICINE

## 2025-06-02 PROCEDURE — G8417 CALC BMI ABV UP PARAM F/U: HCPCS | Performed by: INTERNAL MEDICINE

## 2025-06-02 PROCEDURE — 1123F ACP DISCUSS/DSCN MKR DOCD: CPT | Performed by: INTERNAL MEDICINE

## 2025-06-02 PROCEDURE — G8427 DOCREV CUR MEDS BY ELIG CLIN: HCPCS | Performed by: INTERNAL MEDICINE

## 2025-06-02 PROCEDURE — 1159F MED LIST DOCD IN RCRD: CPT | Performed by: INTERNAL MEDICINE

## 2025-06-02 PROCEDURE — 2022F DILAT RTA XM EVC RTNOPTHY: CPT | Performed by: INTERNAL MEDICINE

## 2025-06-02 PROCEDURE — 1160F RVW MEDS BY RX/DR IN RCRD: CPT | Performed by: INTERNAL MEDICINE

## 2025-06-02 PROCEDURE — G2211 COMPLEX E/M VISIT ADD ON: HCPCS | Performed by: INTERNAL MEDICINE

## 2025-06-02 PROCEDURE — 83036 HEMOGLOBIN GLYCOSYLATED A1C: CPT | Performed by: INTERNAL MEDICINE

## 2025-06-02 PROCEDURE — 1036F TOBACCO NON-USER: CPT | Performed by: INTERNAL MEDICINE

## 2025-06-02 PROCEDURE — 3075F SYST BP GE 130 - 139MM HG: CPT | Performed by: INTERNAL MEDICINE

## 2025-06-02 PROCEDURE — 3044F HG A1C LEVEL LT 7.0%: CPT | Performed by: INTERNAL MEDICINE

## 2025-06-02 PROCEDURE — 3079F DIAST BP 80-89 MM HG: CPT | Performed by: INTERNAL MEDICINE

## 2025-06-02 PROCEDURE — 99214 OFFICE O/P EST MOD 30 MIN: CPT | Performed by: INTERNAL MEDICINE

## 2025-06-02 RX ORDER — ROSUVASTATIN CALCIUM 40 MG/1
TABLET, COATED ORAL
Qty: 90 TABLET | Refills: 1 | Status: SHIPPED | OUTPATIENT
Start: 2025-06-02

## 2025-06-02 RX ORDER — LOSARTAN POTASSIUM 100 MG/1
100 TABLET ORAL DAILY
Qty: 90 TABLET | Refills: 1 | Status: SHIPPED | OUTPATIENT
Start: 2025-06-02

## 2025-06-02 RX ORDER — AMLODIPINE BESYLATE 10 MG/1
10 TABLET ORAL DAILY
Qty: 90 TABLET | Refills: 1 | Status: SHIPPED | OUTPATIENT
Start: 2025-06-02 | End: 2025-11-29

## 2025-06-02 RX ORDER — ATENOLOL 50 MG/1
75 TABLET ORAL DAILY
Qty: 135 TABLET | Refills: 1 | Status: SHIPPED | OUTPATIENT
Start: 2025-06-02 | End: 2025-11-29

## 2025-06-02 NOTE — PROGRESS NOTES
Sonam Valenzuela (:  1955) is a 69 y.o. female, here for evaluation of the following chief complaint(s):    3 Month Follow-Up      ASSESSMENT/PLAN:  1. Type 2 diabetes mellitus with obesity (HCC)  Diet control  -     POCT glycosylated hemoglobin (Hb A1C)  2. Encounter for screening mammogram for malignant neoplasm of breast  -     TIAGO MELANIE DIGITAL SCREEN BILATERAL; Future  3. Essential hypertension  Well-controlled on current medications:  -     losartan (COZAAR) 100 MG tablet; Take 1 tablet by mouth daily, Disp-90 tablet, R-1Normal  -     amLODIPine (NORVASC) 10 MG tablet; Take 1 tablet by mouth daily, Disp-90 tablet, R-1DX Code Needed  .Normal  -     atenolol (TENORMIN) 50 MG tablet; Take 1.5 tablets by mouth daily, Disp-135 tablet, R-1DX Code Needed  .Normal  4. Hyperlipidemia, unspecified hyperlipidemia type  -   Stable on rosuvastatin (CRESTOR) 40 MG tablet; TAKE 1 TABLET BY MOUTH EVERY DAY, Disp-90 tablet, R-1Normal  5. Leg swelling  Patient will need to follow-up with the orthopedist regarding the Baker's cyst and meniscal tear and with Dr. Lopez and/or Dr. Wray re: aspiration of hematoma  Depression, unspecified depression type  Well-controlled on Effexor and methyl folate which we will continue      Return in about 3 months (around 2025) for delroy.    SUBJECTIVE/OBJECTIVE:  HPI  Patient is here for routine follow-up.  Her left calf swelling is slightly better but it persist.  She was not really sure what the next steps were.  Based on chart review, it appears she saw the orthopedist prior to having her leg MRI.  It is unclear if the orthopedist has seen the results:    1. There is an oblong intramuscular hematoma complicated by some thin septations located interposed between the medial head of the gastrocnemius muscle and its posterior peripheral fascia.   2. Large complex Baker's cyst.  3. Subcutaneous edema is identified circumferentially about the knee and calf. This can be seen with

## 2025-06-02 NOTE — PATIENT INSTRUCTIONS
See Marisa again and see Jessica joaquin left leg swelling    Shingrix  at pharmacy    Mammogram 7/25  Colon 4/29    A1c

## 2025-06-03 ENCOUNTER — TELEPHONE (OUTPATIENT)
Dept: ORTHOPEDIC SURGERY | Age: 70
End: 2025-06-03

## 2025-06-03 NOTE — TELEPHONE ENCOUNTER
----- Message from RENETTA Plata sent at 6/2/2025 11:29 AM EDT -----  Dr. Pineda has messaged Dr. Cunningham about this patient multiple times.   I guess we need to just get her into to see him? Can you call to scheduled when you have time?    Thanks  D  ----- Message -----  From: Carmelita Redmond MD  Sent: 6/2/2025  11:22 AM EDT  To: Valeri Cunningham MD    Please see pt's Mri dated 5/6/25 and consider seeing pt again re: baker's cyst and meniscal tear

## 2025-06-11 ENCOUNTER — PATIENT MESSAGE (OUTPATIENT)
Dept: INTERNAL MEDICINE CLINIC | Age: 70
End: 2025-06-11

## 2025-06-23 ENCOUNTER — OFFICE VISIT (OUTPATIENT)
Dept: ORTHOPEDIC SURGERY | Age: 70
End: 2025-06-23

## 2025-06-23 VITALS — HEIGHT: 69 IN | WEIGHT: 197 LBS | BODY MASS INDEX: 29.18 KG/M2

## 2025-06-23 DIAGNOSIS — E66.9 TYPE 2 DIABETES MELLITUS WITH OBESITY (HCC): ICD-10-CM

## 2025-06-23 DIAGNOSIS — M71.22 SYNOVIAL CYST OF POPLITEAL SPACE (BAKER), LEFT KNEE: Primary | ICD-10-CM

## 2025-06-23 DIAGNOSIS — M79.81 NONTRAUMATIC INTRAMUSCULAR HEMATOMA: ICD-10-CM

## 2025-06-23 DIAGNOSIS — S80.12XS LEG HEMATOMA, LEFT, SEQUELA: ICD-10-CM

## 2025-06-23 DIAGNOSIS — E11.69 TYPE 2 DIABETES MELLITUS WITH OBESITY (HCC): ICD-10-CM

## 2025-06-23 PROCEDURE — MISC250 COMPRESSION STOCKING: Performed by: INTERNAL MEDICINE

## 2025-06-23 RX ORDER — LIDOCAINE HYDROCHLORIDE 10 MG/ML
5 INJECTION, SOLUTION INFILTRATION; PERINEURAL ONCE
Status: COMPLETED | OUTPATIENT
Start: 2025-06-23 | End: 2025-06-23

## 2025-06-23 RX ORDER — BETAMETHASONE SODIUM PHOSPHATE AND BETAMETHASONE ACETATE 3; 3 MG/ML; MG/ML
6 INJECTION, SUSPENSION INTRA-ARTICULAR; INTRALESIONAL; INTRAMUSCULAR; SOFT TISSUE ONCE
Status: COMPLETED | OUTPATIENT
Start: 2025-06-23 | End: 2025-06-23

## 2025-06-23 RX ORDER — BUPIVACAINE HYDROCHLORIDE 2.5 MG/ML
3 INJECTION, SOLUTION INFILTRATION; PERINEURAL ONCE
Status: COMPLETED | OUTPATIENT
Start: 2025-06-23 | End: 2025-06-23

## 2025-06-23 RX ADMIN — BUPIVACAINE HYDROCHLORIDE 7.5 MG: 2.5 INJECTION, SOLUTION INFILTRATION; PERINEURAL at 16:07

## 2025-06-23 RX ADMIN — BETAMETHASONE SODIUM PHOSPHATE AND BETAMETHASONE ACETATE 6 MG: 3; 3 INJECTION, SUSPENSION INTRA-ARTICULAR; INTRALESIONAL; INTRAMUSCULAR; SOFT TISSUE at 16:04

## 2025-06-23 RX ADMIN — LIDOCAINE HYDROCHLORIDE 5 ML: 10 INJECTION, SOLUTION INFILTRATION; PERINEURAL at 16:04

## 2025-06-23 NOTE — PROGRESS NOTES
Chief Complaint:   Chief Complaint   Patient presents with    Leg Pain     Left Calf - Bakers cyst that she first noticed the beginning of April. Swelling every day by the end of the day. Pain. Negative venus doppler.          History of Present Illness:       Patient is a 69 y.o. female presents with the above complaint. The patient is seen in consultation at the request of Dr. Valeri Cunningham and Dr. LINO Redmond for consideration of ultrasound aspiration of spontaneous intramuscular hematoma of the left gastrocnemius. The patient describes a tightness pain that does not radiate.  The symptoms are constant  and are are worsening since the onset.       Pain localizes to the posterior compartment of the lower extremity aspect and  is not predictably aggravated by prolonged weightbearing. There are not mechanical symptoms associated with the symptoms. There are not neuritic symptoms involving the foot or ankle. The patient denies subjective instability about the foot or ankle and denies new onset or progressive weakness of the lower extremity.    Pain level 8    The patient admits to a pattern of activity related swelling.      Treatment to date: Local measures, intolerant of compression stockings    There is no no prior history of foot or ankle trauma. Workup has included MRI    There is no prior history of autoimmune disease, crystal arthropathy, or inflammatory arthropathy.      Past Medical History:        Past Medical History:   Diagnosis Date    Abnormal EKG     saw cardio, wnl stress test 3/20    Allergic rhinitis     spring trees    Arthritis     Bursitis 3/2022    Left elbow    Depression     Depression     Diabetes mellitus type 2 in obese     Endometriosis     Essential hypertension     Headache     History of thrombophlebitis     right leg    Hyperlipidemia     DEJA on CPAP     cpap    Osteoarthritis 2022    Plantar fasciitis     Recurrent sinus infections     Recurrent upper respiratory infection (URI)

## 2025-07-07 ENCOUNTER — OFFICE VISIT (OUTPATIENT)
Dept: PULMONOLOGY | Age: 70
End: 2025-07-07
Payer: MEDICARE

## 2025-07-07 VITALS
HEART RATE: 60 BPM | HEIGHT: 69 IN | OXYGEN SATURATION: 98 % | BODY MASS INDEX: 29.65 KG/M2 | SYSTOLIC BLOOD PRESSURE: 114 MMHG | DIASTOLIC BLOOD PRESSURE: 78 MMHG | WEIGHT: 200.2 LBS

## 2025-07-07 DIAGNOSIS — G47.33 OBSTRUCTIVE SLEEP APNEA SYNDROME: Primary | Chronic | ICD-10-CM

## 2025-07-07 DIAGNOSIS — I10 ESSENTIAL HYPERTENSION: Chronic | ICD-10-CM

## 2025-07-07 PROCEDURE — 1123F ACP DISCUSS/DSCN MKR DOCD: CPT | Performed by: NURSE PRACTITIONER

## 2025-07-07 PROCEDURE — 1090F PRES/ABSN URINE INCON ASSESS: CPT | Performed by: NURSE PRACTITIONER

## 2025-07-07 PROCEDURE — 3078F DIAST BP <80 MM HG: CPT | Performed by: NURSE PRACTITIONER

## 2025-07-07 PROCEDURE — 99214 OFFICE O/P EST MOD 30 MIN: CPT | Performed by: NURSE PRACTITIONER

## 2025-07-07 PROCEDURE — G2211 COMPLEX E/M VISIT ADD ON: HCPCS | Performed by: NURSE PRACTITIONER

## 2025-07-07 PROCEDURE — 3074F SYST BP LT 130 MM HG: CPT | Performed by: NURSE PRACTITIONER

## 2025-07-07 PROCEDURE — G8399 PT W/DXA RESULTS DOCUMENT: HCPCS | Performed by: NURSE PRACTITIONER

## 2025-07-07 PROCEDURE — 1159F MED LIST DOCD IN RCRD: CPT | Performed by: NURSE PRACTITIONER

## 2025-07-07 PROCEDURE — 1160F RVW MEDS BY RX/DR IN RCRD: CPT | Performed by: NURSE PRACTITIONER

## 2025-07-07 PROCEDURE — 3017F COLORECTAL CA SCREEN DOC REV: CPT | Performed by: NURSE PRACTITIONER

## 2025-07-07 PROCEDURE — 1036F TOBACCO NON-USER: CPT | Performed by: NURSE PRACTITIONER

## 2025-07-07 PROCEDURE — G8427 DOCREV CUR MEDS BY ELIG CLIN: HCPCS | Performed by: NURSE PRACTITIONER

## 2025-07-07 PROCEDURE — G8417 CALC BMI ABV UP PARAM F/U: HCPCS | Performed by: NURSE PRACTITIONER

## 2025-07-07 ASSESSMENT — SLEEP AND FATIGUE QUESTIONNAIRES
ESS TOTAL SCORE: 4
HOW LIKELY ARE YOU TO NOD OFF OR FALL ASLEEP IN A CAR, WHILE STOPPED FOR A FEW MINUTES IN TRAFFIC: WOULD NEVER DOZE
HOW LIKELY ARE YOU TO NOD OFF OR FALL ASLEEP WHILE WATCHING TV: SLIGHT CHANCE OF DOZING
HOW LIKELY ARE YOU TO NOD OFF OR FALL ASLEEP WHILE SITTING AND READING: SLIGHT CHANCE OF DOZING
HOW LIKELY ARE YOU TO NOD OFF OR FALL ASLEEP WHILE SITTING QUIETLY AFTER LUNCH WITHOUT ALCOHOL: WOULD NEVER DOZE
HOW LIKELY ARE YOU TO NOD OFF OR FALL ASLEEP WHILE LYING DOWN TO REST IN THE AFTERNOON WHEN CIRCUMSTANCES PERMIT: MODERATE CHANCE OF DOZING
HOW LIKELY ARE YOU TO NOD OFF OR FALL ASLEEP WHILE SITTING AND TALKING TO SOMEONE: WOULD NEVER DOZE
HOW LIKELY ARE YOU TO NOD OFF OR FALL ASLEEP WHEN YOU ARE A PASSENGER IN A CAR FOR AN HOUR WITHOUT A BREAK: WOULD NEVER DOZE
HOW LIKELY ARE YOU TO NOD OFF OR FALL ASLEEP WHILE SITTING INACTIVE IN A PUBLIC PLACE: WOULD NEVER DOZE

## 2025-07-07 NOTE — PROGRESS NOTES
Diagnosis: [x] DEJA (G47.33) [] CSA (G47.31) [] Apnea (G47.30)   Length of Need: [x] 15 Months [] 99 Months [] Other:   Machine (JONA!): [] Respironics Dream Station      Auto [] ResMed AirSense     Auto [] Other:     []  CPAP () [] Bilevel ()   Mode: [] Auto [] Spontaneous    Mode: [] Auto [] Spontaneous            Comfort Settings:      Humidifier: [] Heated ()        [x] Water chamber replacement ()/ 1 per 6 months        Mask:   [x] Nasal () /1 per 3 months [] Full Face () /1 per 3 months   [x] Patient choice -Size and fit mask [] Patient Choice - Size and fit mask   [] Dispense: [] Dispense:   [x] Headgear () / 1 per 3 months [] Headgear () / 1 per 3 months   [x] Replacement Nasal Cushion ()/2 per month [] Interface Replacement ()/1 per month   [] Replacement Nasal Pillows ()/2 per month         Tubing: [x] Heated ()/1 per 3 months    [] Standard ()/1 per 3 months [] Other:           Filters: [x] Non-disposable ()/1 per 6 months     [x] Ultra-Fine, Disposable ()/2 per month        Miscellaneous: [x] Chin Strap ()/ 1 per 6 months [] O2 bleed-in:        LPM   [] Oxymetry on CPAP/Bilevel []  Other:         Start Order Date: 07/07/25    MEDICAL JUSTIFICATION:  I, the undersigned, certify that the above prescribed supplies are medically necessary for this patient’s wellbeing.  In my opinion, the supplies are both reasonable and necessary in reference to accepted standards of medicalpractice in treatment of this patient’s condition.    SUZY STRATTON NP    NPI: [id number]       Order Signed Date: 07/07/25  [facility]  Pulmonary, Sleep, and Critical Care    Pulmonary, Sleep, and Critical Care  [facility] [address]                          [address] [place] [address] [address]  [place] [address]                                    [place] [address]  Phone: [phone]    Fax:

## 2025-07-07 NOTE — PROGRESS NOTES
Sonam Valenzuela         : 1955    Diagnosis: [x] Hypoxia (R09.02) [] CSA (G47.31) [x] Apnea (G47.30)   Length of Need: [] 13 Months [] 99 Months [] Other:    Machine (JONA!): [] Respironics Dream Station      Auto [] ResMed AirSense     Auto [] Other:     []  CPAP () [] Bilevel ()   Mode: [] Auto [] Spontaneous    Mode: [] Auto [] Spontaneous              Comfort Settings:        Humidifier: [] Heated ()        [] Water chamber replacement ()/ 1 per 6 months        Mask:   [] Nasal () /1 per 3 months [] Full Face () /1 per 3 months   [] Patient choice -Size and fit mask [] Patient Choice - Size and fit mask   [] Dispense:  [] Dispense:    [] Headgear () / 1 per 3 months [] Headgear () / 1 per 3 months   [] Replacement Nasal Cushion ()/2 per month [] Interface Replacement ()/1 per month   [] Replacement Nasal Pillows ()/2 per month         Tubing: [] Heated ()/1 per 3 months    [] Standard ()/1 per 3 months [] Other:           Filters: [] Non-disposable ()/1 per 6 months     [] Ultra-Fine, Disposable ()/2 per month        Miscellaneous: [] Chin Strap ()/ 1 per 6 months [] O2 bleed-in:       LPM   [x] Overnight Oximetry on CPAP/Bilevel []  Other:    [] Modem: ()         Start Order Date: 25    MEDICAL JUSTIFICATION:  I, the undersigned, certify that the above prescribed supplies are medically necessary for this patient’s wellbeing.  In my opinion, the supplies are both reasonable and necessary in reference to accepted standards of medicalpractice in treatment of this patient’s condition.    JODY Hernandez      NPI: [id number]       Order Signed Date: 25    Electronically signed by JODY Hernandez on 2025 at 11:45 AM    Sonam Valenzuela  1955  [address] [facility] [address]  [phone] (home)   [phone] (mobile)      Insurance Info (confirm with patient if correct):  Payer/Plan Subscr  Sex

## 2025-07-07 NOTE — ASSESSMENT & PLAN NOTE
Chronic-Stable: Reviewed and analyzed results of physiologic download from patient's machine and reviewed with patient.  Supplies and parts as needed for her machine.  These are medically necessary.  Limit caffeine use after 3pm. Based on the analyzed data will continue with current settings. Stable on her machine at current settings, getting benefit from the use, and having minimal side effects. Will check overnight oximetry due to waking with headaches.  Will call results as they become available.  Will see her back in 1 year.  Encouraged her to contact the office with any questions or concerns.

## 2025-07-14 ENCOUNTER — OFFICE VISIT (OUTPATIENT)
Dept: ORTHOPEDIC SURGERY | Age: 70
End: 2025-07-14

## 2025-07-14 VITALS — HEIGHT: 69 IN | WEIGHT: 200 LBS | BODY MASS INDEX: 29.62 KG/M2

## 2025-07-14 DIAGNOSIS — S80.12XS LEG HEMATOMA, LEFT, SEQUELA: ICD-10-CM

## 2025-07-14 DIAGNOSIS — E11.69 TYPE 2 DIABETES MELLITUS WITH OBESITY (HCC): ICD-10-CM

## 2025-07-14 DIAGNOSIS — M23.322 DEGENERATIVE TEAR OF POSTERIOR HORN OF MEDIAL MENISCUS OF LEFT KNEE: ICD-10-CM

## 2025-07-14 DIAGNOSIS — M71.22 SYNOVIAL CYST OF POPLITEAL SPACE (BAKER), LEFT KNEE: Primary | ICD-10-CM

## 2025-07-14 DIAGNOSIS — E66.9 TYPE 2 DIABETES MELLITUS WITH OBESITY (HCC): ICD-10-CM

## 2025-07-14 RX ORDER — AMLODIPINE BESYLATE 5 MG/1
5 TABLET ORAL DAILY
COMMUNITY
Start: 2025-06-23

## 2025-07-14 RX ORDER — MUPIROCIN 2 %
2 OINTMENT (GRAM) TOPICAL DAILY
COMMUNITY

## 2025-07-14 RX ORDER — METFORMIN HYDROCHLORIDE 500 MG/1
500 TABLET, EXTENDED RELEASE ORAL
COMMUNITY

## 2025-07-14 NOTE — PROGRESS NOTES
Chief Complaint:   Chief Complaint   Patient presents with    Leg Pain     Left Leg - Bakers cyst has not filled back up. No pain, no swelling. She feels great.          History of Present Illness:       Patient is a 69 y.o. female who returns in follow up for the above complaint. The patient was last seen approximately 1 monthsago. The symptoms   are improving since the last visit. The patient has had no further testing for the problem.      Status post ultrasound-guided aspiration injection Sena's cyst 6/23/2025    Increasing more functional and pain has essentially resolved.    Pain levels 0/10.  She has transition to as needed use of JOE stocking    No mechanical symptoms no subjective instability   Present Medications:         Current Outpatient Medications   Medication Sig Dispense Refill    amLODIPine (NORVASC) 5 MG tablet Take 1 tablet by mouth daily      mupirocin (BACTROBAN) 2 % ointment Apply 2 each topically daily      metFORMIN (GLUCOPHAGE-XR) 500 MG extended release tablet Take 1 tablet by mouth daily (with breakfast)      Dextromethorphan-Pyrilamine 30-30 MG TABS Take by mouth      losartan (COZAAR) 100 MG tablet Take 1 tablet by mouth daily 90 tablet 1    rosuvastatin (CRESTOR) 40 MG tablet TAKE 1 TABLET BY MOUTH EVERY DAY 90 tablet 1    amLODIPine (NORVASC) 10 MG tablet Take 1 tablet by mouth daily 90 tablet 1    atenolol (TENORMIN) 50 MG tablet Take 1.5 tablets by mouth daily 135 tablet 1    venlafaxine (EFFEXOR XR) 150 MG extended release capsule TAKE 1 CAPSULE BY MOUTH EVERY DAY 90 capsule 1    L-METHYLFOLATE-METHYLCOBALAMIN PO Take 2 capsules by mouth daily L-methylfolate Ca 6mg, P-5-P 70mg, Me-Cbl 4mg, ALA 600mg, S-Jarvis 300mg      glucose monitoring kit 1 kit by Does not apply route daily 1 kit 0    blood glucose monitor strips Test 1 times a day & as needed for symptoms of irregular blood glucose. 100 strip 0    omega-3 acid ethyl esters (LOVAZA) 1 g capsule Take 2 capsules by mouth 2 times

## 2025-07-22 ENCOUNTER — TELEPHONE (OUTPATIENT)
Dept: PULMONOLOGY | Age: 70
End: 2025-07-22

## 2025-07-22 NOTE — TELEPHONE ENCOUNTER
Overnight oximetry shows some residual hypoxia.Looks like her oximeter fell off between 0200 and 0600 as there is no readings at that time. Will increase pressure slightly in her machine. Please encourage her to contact her DME to schedule a mask fitting as her mask appears to have a large leak and is not fitting well. Please have her notify the office once this is complete to repeat the oximetry once the leak is controlled.

## 2025-08-07 ENCOUNTER — TELEPHONE (OUTPATIENT)
Dept: ORTHOPEDIC SURGERY | Age: 70
End: 2025-08-07

## 2025-08-07 DIAGNOSIS — M79.89 SWELLING OF CALF: ICD-10-CM

## 2025-08-07 DIAGNOSIS — M71.22 SYNOVIAL CYST OF POPLITEAL SPACE (BAKER), LEFT KNEE: Primary | ICD-10-CM

## 2025-08-07 DIAGNOSIS — M23.322 DEGENERATIVE TEAR OF POSTERIOR HORN OF MEDIAL MENISCUS OF LEFT KNEE: ICD-10-CM

## 2025-08-12 ENCOUNTER — OFFICE VISIT (OUTPATIENT)
Dept: ORTHOPEDIC SURGERY | Age: 70
End: 2025-08-12

## 2025-08-12 VITALS — BODY MASS INDEX: 29.62 KG/M2 | WEIGHT: 200 LBS | HEIGHT: 69 IN

## 2025-08-12 DIAGNOSIS — E11.69 TYPE 2 DIABETES MELLITUS WITH OBESITY (HCC): ICD-10-CM

## 2025-08-12 DIAGNOSIS — M71.22 SYNOVIAL CYST OF POPLITEAL SPACE (BAKER), LEFT KNEE: Primary | ICD-10-CM

## 2025-08-12 DIAGNOSIS — E66.9 TYPE 2 DIABETES MELLITUS WITH OBESITY (HCC): ICD-10-CM

## 2025-08-12 DIAGNOSIS — M79.89 SWELLING OF CALF: ICD-10-CM

## 2025-08-12 DIAGNOSIS — M23.322 DEGENERATIVE TEAR OF POSTERIOR HORN OF MEDIAL MENISCUS OF LEFT KNEE: ICD-10-CM

## 2025-08-12 RX ORDER — LIDOCAINE HYDROCHLORIDE 10 MG/ML
5 INJECTION, SOLUTION INFILTRATION; PERINEURAL ONCE
Status: COMPLETED | OUTPATIENT
Start: 2025-08-12 | End: 2025-08-12

## 2025-08-12 RX ORDER — METHYLPREDNISOLONE ACETATE 40 MG/ML
40 INJECTION, SUSPENSION INTRA-ARTICULAR; INTRALESIONAL; INTRAMUSCULAR; SOFT TISSUE ONCE
Status: COMPLETED | OUTPATIENT
Start: 2025-08-12 | End: 2025-08-12

## 2025-08-12 RX ORDER — ROPIVACAINE HYDROCHLORIDE 5 MG/ML
4 INJECTION, SOLUTION EPIDURAL; INFILTRATION; PERINEURAL ONCE
Status: COMPLETED | OUTPATIENT
Start: 2025-08-12 | End: 2025-08-12

## 2025-08-12 RX ADMIN — ROPIVACAINE HYDROCHLORIDE 4 ML: 5 INJECTION, SOLUTION EPIDURAL; INFILTRATION; PERINEURAL at 16:41

## 2025-08-12 RX ADMIN — METHYLPREDNISOLONE ACETATE 40 MG: 40 INJECTION, SUSPENSION INTRA-ARTICULAR; INTRALESIONAL; INTRAMUSCULAR; SOFT TISSUE at 16:42

## 2025-08-12 RX ADMIN — LIDOCAINE HYDROCHLORIDE 5 ML: 10 INJECTION, SOLUTION INFILTRATION; PERINEURAL at 16:41

## 2025-08-25 ENCOUNTER — OFFICE VISIT (OUTPATIENT)
Dept: ORTHOPEDIC SURGERY | Age: 70
End: 2025-08-25
Payer: MEDICARE

## 2025-08-25 VITALS — BODY MASS INDEX: 29.62 KG/M2 | WEIGHT: 200 LBS | HEIGHT: 69 IN

## 2025-08-25 DIAGNOSIS — M17.12 PRIMARY OSTEOARTHRITIS OF LEFT KNEE: ICD-10-CM

## 2025-08-25 DIAGNOSIS — E11.69 TYPE 2 DIABETES MELLITUS WITH OBESITY (HCC): ICD-10-CM

## 2025-08-25 DIAGNOSIS — E66.9 TYPE 2 DIABETES MELLITUS WITH OBESITY (HCC): ICD-10-CM

## 2025-08-25 DIAGNOSIS — M23.322 DEGENERATIVE TEAR OF POSTERIOR HORN OF MEDIAL MENISCUS OF LEFT KNEE: ICD-10-CM

## 2025-08-25 DIAGNOSIS — M71.22 SYNOVIAL CYST OF POPLITEAL SPACE (BAKER), LEFT KNEE: Primary | ICD-10-CM

## 2025-08-25 PROCEDURE — 3017F COLORECTAL CA SCREEN DOC REV: CPT | Performed by: INTERNAL MEDICINE

## 2025-08-25 PROCEDURE — 1090F PRES/ABSN URINE INCON ASSESS: CPT | Performed by: INTERNAL MEDICINE

## 2025-08-25 PROCEDURE — G8417 CALC BMI ABV UP PARAM F/U: HCPCS | Performed by: INTERNAL MEDICINE

## 2025-08-25 PROCEDURE — 3044F HG A1C LEVEL LT 7.0%: CPT | Performed by: INTERNAL MEDICINE

## 2025-08-25 PROCEDURE — 1036F TOBACCO NON-USER: CPT | Performed by: INTERNAL MEDICINE

## 2025-08-25 PROCEDURE — 1123F ACP DISCUSS/DSCN MKR DOCD: CPT | Performed by: INTERNAL MEDICINE

## 2025-08-25 PROCEDURE — 99213 OFFICE O/P EST LOW 20 MIN: CPT | Performed by: INTERNAL MEDICINE

## 2025-08-25 PROCEDURE — 1125F AMNT PAIN NOTED PAIN PRSNT: CPT | Performed by: INTERNAL MEDICINE

## 2025-08-25 PROCEDURE — G8427 DOCREV CUR MEDS BY ELIG CLIN: HCPCS | Performed by: INTERNAL MEDICINE

## 2025-08-25 PROCEDURE — 2022F DILAT RTA XM EVC RTNOPTHY: CPT | Performed by: INTERNAL MEDICINE

## 2025-08-25 PROCEDURE — G8399 PT W/DXA RESULTS DOCUMENT: HCPCS | Performed by: INTERNAL MEDICINE

## 2025-08-25 PROCEDURE — 1159F MED LIST DOCD IN RCRD: CPT | Performed by: INTERNAL MEDICINE

## (undated) DEVICE — INTENDED TO AID IN THE PASSING OF SUTURES THROUGH BONE AND SOFT TISSUE DURING ORTHOPEDIC SURGERY: Brand: HOFFEE SUTURE RETRIEVER

## (undated) DEVICE — KIT INSTR W/ 2.4MM GUIDEPIN SUT PASS WIRE NO2 FIBERWIRE

## (undated) DEVICE — SUTURE VCRL SZ 0 L18IN ABSRB UD L36MM CT-1 1/2 CIR J840D

## (undated) DEVICE — 3M™ COBAN™ NL STERILE NON-LATEX SELF-ADHERENT WRAP, 2086S, 6 IN X 5 YD (15 CM X 4,5 M), 12 ROLLS/CASE: Brand: 3M™ COBAN™

## (undated) DEVICE — DRAPE ADAPTABLE ARM POSITIONER

## (undated) DEVICE — GOWN,REINFRCE,POLY,ECLIPSE,SLV,3XLG: Brand: MEDLINE

## (undated) DEVICE — SOLUTION IV 1000ML 0.9% SOD CHL

## (undated) DEVICE — GOWN,SIRUS,POLYRNF,BRTHSLV,XLN/XXL,18/CS: Brand: MEDLINE

## (undated) DEVICE — SUTURE VCRL SZ 2-0 L18IN ABSRB UD CT-1 L36MM 1/2 CIR J839D

## (undated) DEVICE — GLOVE ORTHO 8   MSG9480

## (undated) DEVICE — SYSTEM SKIN CLSR 22CM DERMBND PRINEO

## (undated) DEVICE — SOLUTION IV IRRIG WATER 1000ML POUR BRL 2F7114

## (undated) DEVICE — GLOVE ORANGE PI 8   MSG9080

## (undated) DEVICE — SUTURE ETHBND EXCEL SZ 2 L30IN NONABSORBABLE GRN L40MM V-37 MX69G

## (undated) DEVICE — SST TWIST DRILL, STANDARD, 2.4MM DIA. X 127MM: Brand: MICROAIRE®

## (undated) DEVICE — UNDERGLOVE SURG SZ 8 BLU LTX FREE SYN POLYISOPRENE POLYMER

## (undated) DEVICE — MAT FLR W32XL58IN

## (undated) DEVICE — GOWN,SIRUS,POLYRNF,BRTHSLV,XL,30/CS: Brand: MEDLINE

## (undated) DEVICE — TOTAL SHOULDER: Brand: MEDLINE INDUSTRIES, INC.

## (undated) DEVICE — SOLUTION IRRIG 3000ML 0.9% SOD CHL USP UROMATIC PLAS CONT

## (undated) DEVICE — SUTURE MCRYL SZ 4-0 L27IN ABSRB UD L19MM PS-2 1/2 CIR PRIM Y426H

## (undated) DEVICE — TOWEL,STOP FLAG GOLD N-W: Brand: MEDLINE